# Patient Record
Sex: FEMALE | Race: WHITE | NOT HISPANIC OR LATINO | Employment: UNEMPLOYED | URBAN - METROPOLITAN AREA
[De-identification: names, ages, dates, MRNs, and addresses within clinical notes are randomized per-mention and may not be internally consistent; named-entity substitution may affect disease eponyms.]

---

## 2017-04-14 ENCOUNTER — OFFICE VISIT (OUTPATIENT)
Dept: FAMILY MEDICINE CLINIC | Facility: CLINIC | Age: 42
End: 2017-04-14

## 2017-04-14 VITALS
TEMPERATURE: 98.6 F | WEIGHT: 190 LBS | SYSTOLIC BLOOD PRESSURE: 110 MMHG | HEIGHT: 67 IN | OXYGEN SATURATION: 94 % | DIASTOLIC BLOOD PRESSURE: 70 MMHG | RESPIRATION RATE: 16 BRPM | BODY MASS INDEX: 29.82 KG/M2 | HEART RATE: 81 BPM

## 2017-04-14 DIAGNOSIS — Z00.00 LABORATORY EXAMINATION ORDERED AS PART OF A ROUTINE GENERAL MEDICAL EXAMINATION: ICD-10-CM

## 2017-04-14 DIAGNOSIS — G93.5 ARNOLD-CHIARI MALFORMATION, TYPE I (HCC): ICD-10-CM

## 2017-04-14 DIAGNOSIS — E55.9 VITAMIN D DEFICIENCY DISEASE: ICD-10-CM

## 2017-04-14 DIAGNOSIS — F41.9 ANXIETY: ICD-10-CM

## 2017-04-14 DIAGNOSIS — E03.9 ACQUIRED HYPOTHYROIDISM: ICD-10-CM

## 2017-04-14 DIAGNOSIS — H00.012 HORDEOLUM EXTERNUM OF RIGHT LOWER EYELID: Primary | ICD-10-CM

## 2017-04-14 DIAGNOSIS — E78.2 MIXED HYPERLIPIDEMIA: ICD-10-CM

## 2017-04-14 DIAGNOSIS — E53.8 VITAMIN B12 DEFICIENCY: ICD-10-CM

## 2017-04-14 PROCEDURE — 99213 OFFICE O/P EST LOW 20 MIN: CPT | Performed by: PHYSICIAN ASSISTANT

## 2017-04-14 RX ORDER — AMOXICILLIN AND CLAVULANATE POTASSIUM 875; 125 MG/1; MG/1
1 TABLET, FILM COATED ORAL 2 TIMES DAILY
Qty: 20 TABLET | Refills: 0 | Status: SHIPPED | OUTPATIENT
Start: 2017-04-14 | End: 2017-11-21

## 2017-04-14 RX ORDER — FLUCONAZOLE 150 MG/1
150 TABLET ORAL ONCE
Qty: 1 TABLET | Refills: 2 | Status: SHIPPED | OUTPATIENT
Start: 2017-04-14 | End: 2017-04-14

## 2017-04-14 RX ORDER — ERYTHROMYCIN 5 MG/G
OINTMENT OPHTHALMIC NIGHTLY
Qty: 1 EACH | Refills: 1 | Status: SHIPPED | OUTPATIENT
Start: 2017-04-14 | End: 2017-11-21

## 2017-04-14 NOTE — PATIENT INSTRUCTIONS
Warned patient that this medication can cause drowsiness and impair them operating machinery, including driving a car.  Caution is advised.  Low glycemic index diet  Exercise 30 minutes most days of the week  Make sure you get results on any labs or tests we ordered today  We discussed medications and how to take them as prescribed  Sleep 6-8 hours each night if possible  If you have not signed up for "[employer]hart, please activate your code ASAP from your After Visit Summary today    LDL goal <100  LDL goal if heart disease <70  HDL goal >60  Triglyceride goal <150  BP goal =<130/80  Fasting glucose <100    ER if facial redness or edema worse/spreads

## 2017-04-14 NOTE — PROGRESS NOTES
Subjective   Cindy Hawk is a 41 y.o. female.     History of Present Illness   Cindy Hawk 41 y.o. female who presents today for stye right eye and did rupture upon awakening;  Yellow white;    she has a history of   Patient Active Problem List   Diagnosis   • Vitamin B12 deficiency   • Vitamin D deficiency disease   • Acquired hypothyroidism   • Arnold-Chiari malformation, type I   • Neck pain   • Peripheral neuropathy   • Headache   • Anxiety   • Hyperlipidemia   • Arthritis   .    Some redness and edema right lower eye orbit and some better today and will still Rx oral Augmentin   Zoloft is helping anxiety and will cont. Same    Has known chronic h/a's and takes ultram and Dr. Jack will allow Rx for #90  I will need to update thyroid Rx    Lab Results   Component Value Date    TSH 5.650 (H) 05/18/2016   B12 was 329 and not on OTC  Need B12 level  No results found for: CHOL  No results found for: TRIG  No results found for: HDL  No results found for: LDLCALC  No results found for: LDL  No results found for: HDLLDLRATIO  No components found for: CHOLHDL    The following portions of the patient's history were reviewed and updated as appropriate: allergies, current medications, past family history, past medical history, past social history, past surgical history and problem list.    Review of Systems   Constitutional: Negative for activity change, appetite change and unexpected weight change.   HENT: Negative for nosebleeds and trouble swallowing.    Eyes: Positive for discharge. Negative for pain (was pain yesterday) and visual disturbance.   Respiratory: Negative for chest tightness, shortness of breath and wheezing.    Cardiovascular: Negative for chest pain and palpitations.   Gastrointestinal: Negative for abdominal pain and blood in stool.   Endocrine: Negative.    Genitourinary: Negative for difficulty urinating and hematuria.   Musculoskeletal: Negative for joint swelling.   Skin: Negative  for color change and rash.   Allergic/Immunologic: Negative.    Neurological: Positive for headaches. Negative for syncope and speech difficulty.   Hematological: Negative for adenopathy.   Psychiatric/Behavioral: Negative for agitation and confusion.   All other systems reviewed and are negative.      Objective   Physical Exam   Constitutional: She is oriented to person, place, and time. She appears well-developed and well-nourished. No distress.   HENT:   Head: Normocephalic and atraumatic.   Eyes: EOM are normal. Pupils are equal, round, and reactive to light. Right eye exhibits no discharge. Left eye exhibits no discharge. No scleral icterus.   Stye right margin mid lower   Neck: Normal range of motion. Neck supple. No tracheal deviation present. No thyromegaly present.   Cardiovascular: Normal rate, regular rhythm, normal heart sounds, intact distal pulses and normal pulses.  Exam reveals no gallop.    No murmur heard.  Pulmonary/Chest: Effort normal and breath sounds normal. No respiratory distress. She has no wheezes. She has no rales.   Musculoskeletal: Normal range of motion.   Neurological: She is alert and oriented to person, place, and time. She exhibits normal muscle tone. Coordination normal.   Skin: Skin is warm. No rash noted. There is erythema (internal hordelum mid lower right lid;  she does have some redness and edema right lower orbit;  less sore to touch). No pallor.   Psychiatric: She has a normal mood and affect. Her behavior is normal. Judgment and thought content normal.   Nursing note and vitals reviewed.      Assessment/Plan   Cindy was seen today for eye pain.    Diagnoses and all orders for this visit:    Hordeolum externum of right lower eyelid  -     Comprehensive metabolic panel  -     Lipid panel  -     CBC and Differential  -     TSH  -     T4, Free  -     Vitamin D 25 Hydroxy  -     Vitamin B12  -     Folate    Acquired hypothyroidism  -     Comprehensive metabolic panel  -      Lipid panel  -     CBC and Differential  -     TSH  -     T4, Free  -     Vitamin D 25 Hydroxy  -     Vitamin B12  -     Folate    Vitamin D deficiency disease  -     Comprehensive metabolic panel  -     Lipid panel  -     CBC and Differential  -     TSH  -     T4, Free  -     Vitamin D 25 Hydroxy  -     Vitamin B12  -     Folate    Vitamin B12 deficiency  -     Comprehensive metabolic panel  -     Lipid panel  -     CBC and Differential  -     TSH  -     T4, Free  -     Vitamin D 25 Hydroxy  -     Vitamin B12  -     Folate    Arnold-Chiari malformation, type I  -     Comprehensive metabolic panel  -     Lipid panel  -     CBC and Differential  -     TSH  -     T4, Free  -     Vitamin D 25 Hydroxy  -     Vitamin B12  -     Folate    Mixed hyperlipidemia  -     Comprehensive metabolic panel  -     Lipid panel  -     CBC and Differential  -     TSH  -     T4, Free  -     Vitamin D 25 Hydroxy  -     Vitamin B12  -     Folate    Anxiety  -     Comprehensive metabolic panel  -     Lipid panel  -     CBC and Differential  -     TSH  -     T4, Free  -     Vitamin D 25 Hydroxy  -     Vitamin B12  -     Folate    Laboratory examination ordered as part of a routine general medical examination  -     Comprehensive metabolic panel  -     Lipid panel  -     CBC and Differential  -     TSH  -     T4, Free  -     Vitamin D 25 Hydroxy  -     Vitamin B12  -     Folate    Other orders  -     amoxicillin-clavulanate (AUGMENTIN) 875-125 MG per tablet; Take 1 tablet by mouth 2 (Two) Times a Day. One PO BID for infection  -     fluconazole (DIFLUCAN) 150 MG tablet; Take 1 tablet by mouth 1 (One) Time for 1 dose. One PO X 1 for yeast infection  -     erythromycin (ROMYCIN) 5 MG/GM ophthalmic ointment; Administer  to the right eye Every Night. QID X 5-7 days (2 days past clear)              I have reviewed the notes, assessments, and/or procedures performed by Lyly Back PA-C, I concur with her/his documentation of Cindy BURGER  Carine.

## 2017-11-21 ENCOUNTER — OFFICE VISIT (OUTPATIENT)
Dept: FAMILY MEDICINE CLINIC | Facility: CLINIC | Age: 42
End: 2017-11-21

## 2017-11-21 VITALS
DIASTOLIC BLOOD PRESSURE: 70 MMHG | TEMPERATURE: 98.2 F | OXYGEN SATURATION: 100 % | RESPIRATION RATE: 16 BRPM | WEIGHT: 183 LBS | SYSTOLIC BLOOD PRESSURE: 120 MMHG | HEIGHT: 67 IN | HEART RATE: 81 BPM | BODY MASS INDEX: 28.72 KG/M2

## 2017-11-21 DIAGNOSIS — E55.9 VITAMIN D DEFICIENCY DISEASE: ICD-10-CM

## 2017-11-21 DIAGNOSIS — G93.5 ARNOLD-CHIARI MALFORMATION, TYPE I (HCC): ICD-10-CM

## 2017-11-21 DIAGNOSIS — E03.9 ACQUIRED HYPOTHYROIDISM: ICD-10-CM

## 2017-11-21 DIAGNOSIS — E53.8 VITAMIN B12 DEFICIENCY: Primary | ICD-10-CM

## 2017-11-21 DIAGNOSIS — H53.9 VISUAL DISTURBANCE: ICD-10-CM

## 2017-11-21 DIAGNOSIS — H93.13 TINNITUS OF BOTH EARS: ICD-10-CM

## 2017-11-21 DIAGNOSIS — F41.9 ANXIETY: Primary | ICD-10-CM

## 2017-11-21 DIAGNOSIS — E53.8 VITAMIN B12 DEFICIENCY: ICD-10-CM

## 2017-11-21 DIAGNOSIS — R51.9 WORSENING HEADACHES: ICD-10-CM

## 2017-11-21 PROCEDURE — 99214 OFFICE O/P EST MOD 30 MIN: CPT | Performed by: PHYSICIAN ASSISTANT

## 2017-11-21 PROCEDURE — 90686 IIV4 VACC NO PRSV 0.5 ML IM: CPT | Performed by: PHYSICIAN ASSISTANT

## 2017-11-21 PROCEDURE — 90471 IMMUNIZATION ADMIN: CPT | Performed by: PHYSICIAN ASSISTANT

## 2017-11-21 RX ORDER — TRAMADOL HYDROCHLORIDE 50 MG/1
50 TABLET ORAL EVERY 6 HOURS PRN
Qty: 90 TABLET | Refills: 2 | Status: SHIPPED | OUTPATIENT
Start: 2017-11-21 | End: 2018-03-21 | Stop reason: SDUPTHER

## 2017-11-21 NOTE — PATIENT INSTRUCTIONS
Low glycemic index diet  Exercise 30 minutes most days of the week  Make sure you get results on any labs or tests we ordered today  We discussed medications and how to take them as prescribed  Sleep 6-8 hours each night if possible  If you have not signed up for [email or web address], please activate your code ASAP from your After Visit Summary today    LDL goal <100  LDL goal if heart disease <70  HDL goal >60  Triglyceride goal <150  BP goal =<130/80  Fasting glucose <100    Refer neuro  Get eye exam

## 2017-11-21 NOTE — PROGRESS NOTES
Subjective   Cindy Hawk is a 42 y.o. female.     History of Present Illness   Cindy Hawk 42 y.o. female who presents today for routine follow up check and medication refills.  she has a history of   Patient Active Problem List   Diagnosis   • Vitamin B12 deficiency   • Vitamin D deficiency disease   • Acquired hypothyroidism   • Arnold-Chiari malformation, type I   • Neck pain   • Peripheral neuropathy   • Headache   • Anxiety   • Hyperlipidemia   • Arthritis   .  Since the last visit, she has overall felt tired.  She has Hypothyroidism and will need to update labs for continued treatment and Vitamin D deficiency and will update labs to confirm level is at goal >30.  she has been compliant with current medications have reviewed them.  The patient denies medication side effects.    Results for orders placed or performed in visit on 01/27/16   Comprehensive metabolic panel   Result Value Ref Range    Glucose 108 (H) 65 - 99 mg/dL    BUN 13 6 - 20 mg/dL    Creatinine 0.90 0.57 - 1.00 mg/dL    eGFR Non African Am 81 >59 mL/min/1.73    eGFR African Am 94 >59 mL/min/1.73    BUN/Creatinine Ratio 14 8 - 20    Sodium 138 134 - 144 mmol/L    Potassium 4.4 3.5 - 5.2 mmol/L    Chloride 97 97 - 108 mmol/L    Total CO2 25 18 - 29 mmol/L    Calcium 9.4 8.7 - 10.2 mg/dL    Total Protein 7.6 6.0 - 8.5 g/dL    Albumin 4.9 3.5 - 5.5 g/dL    Globulin 2.7 1.5 - 4.5 g/dL    A/G Ratio 1.8 1.1 - 2.5    Total Bilirubin 0.5 0.0 - 1.2 mg/dL    Alkaline Phosphatase 85 39 - 117 IU/L    AST (SGOT) 30 0 - 40 IU/L    ALT (SGPT) 35 (H) 0 - 32 IU/L   Vitamin B12   Result Value Ref Range    Vitamin B-12 329 211 - 946 pg/mL   TSH   Result Value Ref Range    TSH 5.650 (H) 0.450 - 4.500 uIU/mL   CBC and Differential   Result Value Ref Range    WBC 7.6 3.4 - 10.8 x10E3/uL    RBC 4.73 3.77 - 5.28 x10E6/uL    Hemoglobin 13.9 11.1 - 15.9 g/dL    Hematocrit 43.8 34.0 - 46.6 %    MCV 93 79 - 97 fL    MCH 29.4 26.6 - 33.0 pg    MCHC 31.7 31.5  - 35.7 g/dL    RDW 13.7 12.3 - 15.4 %    Platelets 323 150 - 379 x10E3/uL    Neutrophil Rel % 57 %    Lymphocyte Rel % 33 %    Monocyte Rel % 6 %    Eosinophil Rel % 3 %    Basophil Rel % 1 %    Neutrophils Absolute 4.3 1.4 - 7.0 x10E3/uL    Lymphocytes Absolute 2.5 0.7 - 3.1 x10E3/uL    Monocytes Absolute 0.4 0.1 - 0.9 x10E3/uL    Eosinophils Absolute 0.2 0.0 - 0.4 x10E3/uL    Basophils Absolute 0.1 0.0 - 0.2 x10E3/uL    Immature Granulocyte Rel % 0 %    Immature Grans Absolute 0.0 0.0 - 0.1 x10E3/uL       She did labs today;  She has been off thyroid med for 6mos;  She is on Ultram since Chiari surgery years ago and has neck and head pain.  The patient has read and signed the [facility] Controlled Substance Contract.  I will continue to see patient for regular follow up appointments.  They are well controlled on their medication.  LISA has been reviewed by me and is updated every 3 months. The patient is aware of the potential for addiction and dependence.    B12 labs pending and will go from there.  She has not been taking B12 for 6mos    Cindy Hawk 42 y.o. female presents for evaluation of generalized. Symptoms began about several years ago. Generally, the headaches are parietal and occipital.  This is constant.  She is having swishing in ears at night when lays down.  Some ringing in ears at times.  She reports episodes of blurred vision and off balance for hours at a time and resolves.  Can be weeks between episodes. The location of the headache pain is occipital and parietal. Recently, the headaches have been increasing in severity. Work attendance or other daily activities are affected by the headaches. Precipitating factors include: stress. The headaches are usually not preceded by an aura. Associated symptoms include rare nausea rare photophobia. The patient denies depression. Home treatment has included Ultram with some improvement. Other history includes: h/a since surgery . Family history  includes no known family members with significant headaches.  Prior therapies tried include Ultram with relief some help, but still symptoms.    She noticed in less able to smell things in last 2 years    I d/w Dr Jack and agree with MRI with and without contrast; see eye doc and measure pressures; will have her see Neuro next and then consider ENT.  Labs pending  The following portions of the patient's history were reviewed and updated as appropriate: allergies, current medications, past family history, past medical history, past social history, past surgical history and problem list.    Review of Systems   Constitutional: Positive for fatigue and unexpected weight change. Negative for activity change and appetite change.   HENT: Positive for tinnitus. Negative for nosebleeds and trouble swallowing.    Eyes: Negative for pain and visual disturbance.   Respiratory: Negative for chest tightness, shortness of breath and wheezing.    Cardiovascular: Negative for chest pain and palpitations.   Gastrointestinal: Negative for abdominal pain and blood in stool.   Endocrine: Negative.    Genitourinary: Negative for difficulty urinating and hematuria.   Musculoskeletal: Positive for back pain, myalgias, neck pain and neck stiffness. Negative for joint swelling.   Skin: Negative for color change and rash.   Allergic/Immunologic: Negative.    Neurological: Positive for dizziness, tremors, weakness, light-headedness, numbness and headaches. Negative for syncope and speech difficulty.   Hematological: Negative for adenopathy.   Psychiatric/Behavioral: Negative for agitation and confusion.   All other systems reviewed and are negative.      Objective   Physical Exam   Constitutional: She is oriented to person, place, and time. She appears well-developed and well-nourished. No distress.   HENT:   Head: Normocephalic and atraumatic.   Eyes: Conjunctivae and EOM are normal. Pupils are equal, round, and reactive to light. Right  eye exhibits no discharge. Left eye exhibits no discharge. No scleral icterus.   Neck: Normal range of motion. Neck supple. No tracheal deviation present. No thyromegaly present.   Cardiovascular: Normal rate, regular rhythm, normal heart sounds, intact distal pulses and normal pulses.  Exam reveals no gallop.    No murmur heard.  Pulmonary/Chest: Effort normal and breath sounds normal. No respiratory distress. She has no wheezes. She has no rales.   Musculoskeletal: Normal range of motion. She exhibits tenderness (trapezius bilat).   Rhomberg neg and no pronator neg   Lymphadenopathy:     She has no cervical adenopathy.   Neurological: She is alert and oriented to person, place, and time. She has normal reflexes. She displays normal reflexes. No cranial nerve deficit. She exhibits normal muscle tone. Coordination normal.   Skin: Skin is warm. No rash noted. No erythema. No pallor.   Psychiatric: She has a normal mood and affect. Her behavior is normal. Judgment and thought content normal.   Nursing note and vitals reviewed.      Assessment/Plan   Problems Addressed this Visit        Digestive    Vitamin B12 deficiency    Relevant Orders    MRI Brain With & Without Contrast    Ambulatory Referral to Neurology    Vitamin D deficiency disease    Relevant Orders    MRI Brain With & Without Contrast    Ambulatory Referral to Neurology       Endocrine    Acquired hypothyroidism    Relevant Orders    MRI Brain With & Without Contrast    Ambulatory Referral to Neurology       Nervous and Auditory    Arnold-Chiari malformation, type I    Relevant Orders    MRI Brain With & Without Contrast    Ambulatory Referral to Neurology       Other    Anxiety - Primary    Relevant Orders    MRI Brain With & Without Contrast    Ambulatory Referral to Neurology      Other Visit Diagnoses     Visual disturbance        Relevant Orders    MRI Brain With & Without Contrast    Ambulatory Referral to Neurology    Worsening headaches         Relevant Orders    MRI Brain With & Without Contrast    Ambulatory Referral to Neurology    Tinnitus of both ears        Relevant Orders    MRI Brain With & Without Contrast    Ambulatory Referral to Neurology                I have reviewed the notes, assessments, and/or procedures performed by Lyly Back PA-C, I concur with her/his documentation of Cindy Hawk.

## 2017-11-22 LAB
25(OH)D3+25(OH)D2 SERPL-MCNC: 26.7 NG/ML (ref 30–100)
ALBUMIN SERPL-MCNC: 4.6 G/DL (ref 3.5–5.2)
ALBUMIN/GLOB SERPL: 1.6 G/DL
ALP SERPL-CCNC: 75 U/L (ref 39–117)
ALT SERPL-CCNC: 16 U/L (ref 1–33)
AST SERPL-CCNC: 15 U/L (ref 1–32)
BASOPHILS # BLD AUTO: 0.06 10*3/MM3 (ref 0–0.2)
BASOPHILS NFR BLD AUTO: 0.6 % (ref 0–1.5)
BILIRUB SERPL-MCNC: 0.3 MG/DL (ref 0.1–1.2)
BUN SERPL-MCNC: 15 MG/DL (ref 6–20)
BUN/CREAT SERPL: 18.5 (ref 7–25)
CALCIUM SERPL-MCNC: 10.1 MG/DL (ref 8.6–10.5)
CHLORIDE SERPL-SCNC: 101 MMOL/L (ref 98–107)
CHOLEST SERPL-MCNC: 216 MG/DL (ref 0–200)
CO2 SERPL-SCNC: 26.2 MMOL/L (ref 22–29)
CREAT SERPL-MCNC: 0.81 MG/DL (ref 0.57–1)
EOSINOPHIL # BLD AUTO: 0.23 10*3/MM3 (ref 0–0.7)
EOSINOPHIL NFR BLD AUTO: 2.3 % (ref 0.3–6.2)
ERYTHROCYTE [DISTWIDTH] IN BLOOD BY AUTOMATED COUNT: 13.5 % (ref 11.7–13)
FOLATE SERPL-MCNC: 8.62 NG/ML (ref 4.78–24.2)
GFR SERPLBLD CREATININE-BSD FMLA CKD-EPI: 78 ML/MIN/1.73
GFR SERPLBLD CREATININE-BSD FMLA CKD-EPI: 95 ML/MIN/1.73
GLOBULIN SER CALC-MCNC: 2.8 GM/DL
GLUCOSE SERPL-MCNC: 68 MG/DL (ref 65–99)
HCT VFR BLD AUTO: 42.4 % (ref 35.6–45.5)
HDLC SERPL-MCNC: 51 MG/DL (ref 40–60)
HGB BLD-MCNC: 14 G/DL (ref 11.9–15.5)
IMM GRANULOCYTES # BLD: 0.02 10*3/MM3 (ref 0–0.03)
IMM GRANULOCYTES NFR BLD: 0.2 % (ref 0–0.5)
LDLC SERPL CALC-MCNC: 123 MG/DL (ref 0–100)
LYMPHOCYTES # BLD AUTO: 3.55 10*3/MM3 (ref 0.9–4.8)
LYMPHOCYTES NFR BLD AUTO: 35.3 % (ref 19.6–45.3)
MCH RBC QN AUTO: 31.3 PG (ref 26.9–32)
MCHC RBC AUTO-ENTMCNC: 33 G/DL (ref 32.4–36.3)
MCV RBC AUTO: 94.9 FL (ref 80.5–98.2)
MONOCYTES # BLD AUTO: 0.63 10*3/MM3 (ref 0.2–1.2)
MONOCYTES NFR BLD AUTO: 6.3 % (ref 5–12)
NEUTROPHILS # BLD AUTO: 5.57 10*3/MM3 (ref 1.9–8.1)
NEUTROPHILS NFR BLD AUTO: 55.3 % (ref 42.7–76)
PLATELET # BLD AUTO: 250 10*3/MM3 (ref 140–500)
POTASSIUM SERPL-SCNC: 3.7 MMOL/L (ref 3.5–5.2)
PROT SERPL-MCNC: 7.4 G/DL (ref 6–8.5)
RBC # BLD AUTO: 4.47 10*6/MM3 (ref 3.9–5.2)
SODIUM SERPL-SCNC: 140 MMOL/L (ref 136–145)
T4 FREE SERPL-MCNC: 0.7 NG/DL (ref 0.93–1.7)
TRIGL SERPL-MCNC: 209 MG/DL (ref 0–150)
TSH SERPL DL<=0.005 MIU/L-ACNC: 18.21 MIU/ML (ref 0.27–4.2)
VIT B12 SERPL-MCNC: 205 PG/ML (ref 211–946)
VLDLC SERPL CALC-MCNC: 41.8 MG/DL (ref 5–40)
WBC # BLD AUTO: 10.06 10*3/MM3 (ref 4.5–10.7)

## 2017-11-22 RX ORDER — LEVOTHYROXINE SODIUM 0.1 MG/1
100 TABLET ORAL DAILY
Qty: 90 TABLET | Refills: 3 | Status: SHIPPED | OUTPATIENT
Start: 2017-11-22 | End: 2019-03-13

## 2017-11-22 RX ORDER — LEVOTHYROXINE SODIUM 0.05 MG/1
50 TABLET ORAL DAILY
Qty: 30 TABLET | Refills: 0 | Status: SHIPPED | OUTPATIENT
Start: 2017-11-22 | End: 2018-03-21

## 2017-12-08 ENCOUNTER — HOSPITAL ENCOUNTER (OUTPATIENT)
Dept: MRI IMAGING | Facility: HOSPITAL | Age: 42
Discharge: HOME | End: 2017-12-08
Admitting: PHYSICIAN ASSISTANT

## 2017-12-08 DIAGNOSIS — E53.8 VITAMIN B12 DEFICIENCY: ICD-10-CM

## 2017-12-08 DIAGNOSIS — G93.5 ARNOLD-CHIARI MALFORMATION, TYPE I (HCC): ICD-10-CM

## 2017-12-08 DIAGNOSIS — R51.9 WORSENING HEADACHES: ICD-10-CM

## 2017-12-08 DIAGNOSIS — H53.9 VISUAL DISTURBANCE: ICD-10-CM

## 2017-12-08 DIAGNOSIS — E55.9 VITAMIN D DEFICIENCY DISEASE: ICD-10-CM

## 2017-12-08 DIAGNOSIS — F41.9 ANXIETY: ICD-10-CM

## 2017-12-08 DIAGNOSIS — H93.13 TINNITUS OF BOTH EARS: ICD-10-CM

## 2017-12-08 DIAGNOSIS — E03.9 ACQUIRED HYPOTHYROIDISM: ICD-10-CM

## 2017-12-08 PROCEDURE — 70553 MRI BRAIN STEM W/O & W/DYE: CPT

## 2017-12-08 PROCEDURE — A9577 INJ MULTIHANCE: HCPCS | Performed by: PHYSICIAN ASSISTANT

## 2017-12-08 PROCEDURE — 0 GADOBENATE DIMEGLUMINE 529 MG/ML SOLUTION: Performed by: PHYSICIAN ASSISTANT

## 2017-12-08 RX ADMIN — GADOBENATE DIMEGLUMINE 17 ML: 529 INJECTION, SOLUTION INTRAVENOUS at 07:03

## 2017-12-10 DIAGNOSIS — R93.0 ABNORMAL MRI OF HEAD: Primary | ICD-10-CM

## 2017-12-20 RX ORDER — SERTRALINE HYDROCHLORIDE 100 MG/1
TABLET, FILM COATED ORAL
Qty: 45 TABLET | Refills: 10 | Status: SHIPPED | OUTPATIENT
Start: 2017-12-20 | End: 2018-07-23 | Stop reason: SDUPTHER

## 2018-01-09 ENCOUNTER — TELEPHONE (OUTPATIENT)
Dept: NEUROSURGERY | Facility: CLINIC | Age: 43
End: 2018-01-09

## 2018-01-09 NOTE — TELEPHONE ENCOUNTER
Called and spoke with Cindy and she stated that she never received her new patient packet in the mail. Pt stated she will have her  come by the office today while he is on his lunch break to pick it up. Pt agreed to have the packet returned in person by Thursday Jan 11, 2018 because we would not receive it in time if she mailed it back.

## 2018-01-11 NOTE — TELEPHONE ENCOUNTER
Called Cindy to remind her that we need her new patient packet by the end of today. Pt stated that her  should be in the office with the packet around 1pm.

## 2018-01-16 ENCOUNTER — OFFICE VISIT (OUTPATIENT)
Dept: NEUROSURGERY | Facility: CLINIC | Age: 43
End: 2018-01-16

## 2018-01-16 VITALS
SYSTOLIC BLOOD PRESSURE: 124 MMHG | DIASTOLIC BLOOD PRESSURE: 74 MMHG | WEIGHT: 186 LBS | HEIGHT: 67 IN | BODY MASS INDEX: 29.19 KG/M2

## 2018-01-16 DIAGNOSIS — Z86.69 HISTORY OF CHIARI MALFORMATION: ICD-10-CM

## 2018-01-16 DIAGNOSIS — M54.2 NECK PAIN: ICD-10-CM

## 2018-01-16 DIAGNOSIS — G44.89 OTHER HEADACHE SYNDROME: Primary | ICD-10-CM

## 2018-01-16 DIAGNOSIS — M54.12 CERVICAL RADICULITIS: ICD-10-CM

## 2018-01-16 PROCEDURE — 99244 OFF/OP CNSLTJ NEW/EST MOD 40: CPT | Performed by: NURSE PRACTITIONER

## 2018-01-16 NOTE — PROGRESS NOTES
Subjective   Patient ID: Cindy Hawk is a 42 y.o. female is being seen for consultation today at the request of Lyly Back PA-C for headaches and neck pain with blurred vision, numbness in bilateral hands, fatigue and balance issues.   Pt is currently taking Tramadol 50 mg BID.   Pt has hx of Chiari malformation in 2007.   Pt has new MRI brain.     HPI Comments: Ms. Hawk was referred to the office today for persistent headaches and neck pain.  She has a history of suboccipital craniectomy procedure in 2007 for Chiari I malformation.  Despite this surgery she is continue to have persistent headaches.  She denies any nausea or vomiting.  She denies any leg or arm pain but does have persistent tingling in both hands and feet.  She has not had any difficulty with weakness in her upper or lower extremities.    Neck Pain    This is a chronic problem. The current episode started more than 1 year ago. The problem occurs daily. The problem has been gradually worsening. The pain is associated with nothing. The pain is present in the left side, midline, right side and occipital region. The quality of the pain is described as stabbing (pressure). The pain is at a severity of 5/10. The pain is moderate. The symptoms are aggravated by coughing, sneezing, twisting, bending, position and stress. The pain is worse during the day. Stiffness is present all day. Associated symptoms include headaches, numbness (bilateral hands and feet), tingling (bilateral hands and feet), a visual change (blurred vision only after reading a few chapters of a book) and weakness (bilateral hands, arms and legs). Pertinent negatives include no chest pain. She has tried muscle relaxants and heat for the symptoms. The treatment provided mild relief.   Headache    This is a chronic problem. The current episode started more than 1 year ago. The problem occurs constantly. The problem has been gradually worsening. Pain location: All over her  head; primarily the occipital aspect of the head. The pain radiates to the right neck, left neck, right shoulder and left shoulder. The pain quality is similar to prior headaches. The quality of the pain is described as sharp and dull. The pain is at a severity of 6/10. The pain is moderate. Associated symptoms include back pain (Innerscapular pain ), blurred vision, dizziness, neck pain, numbness (bilateral hands and feet), tingling (bilateral hands and feet), tinnitus, a visual change (blurred vision only after reading a few chapters of a book) and weakness (bilateral hands, arms and legs). Pertinent negatives include no ear pain or seizures. The symptoms are aggravated by activity, fatigue, exposure to cold air, emotional stress and coughing. She has tried acetaminophen, NSAIDs, Excedrin, cold packs and darkened room for the symptoms. The treatment provided mild relief. Her past medical history is significant for migraine headaches and pseudotumor cerebri. There is no history of recent head traumas.       The following portions of the patient's history were reviewed and updated as appropriate: allergies, current medications, past family history, past medical history, past social history, past surgical history and problem list.    Review of Systems   Constitutional: Negative for appetite change.   HENT: Positive for tinnitus. Negative for ear pain and facial swelling.    Eyes: Positive for blurred vision.   Respiratory: Negative for chest tightness and shortness of breath.    Cardiovascular: Negative for chest pain.   Musculoskeletal: Positive for back pain (Innerscapular pain ), neck pain and neck stiffness.   Neurological: Positive for dizziness, tingling (bilateral hands and feet), weakness (bilateral hands, arms and legs), numbness (bilateral hands and feet) and headaches. Negative for seizures.   All other systems reviewed and are negative.      Objective   Physical Exam   Constitutional: She is oriented to  person, place, and time. She appears well-developed and well-nourished. She is cooperative. No distress.   HENT:   Head: Normocephalic and atraumatic.   Right Ear: External ear normal.   Left Ear: External ear normal.   Nose: Nose normal.   Mouth/Throat: Oropharynx is clear and moist.   Eyes: Conjunctivae and EOM are normal. Pupils are equal, round, and reactive to light. Right eye exhibits no discharge. Left eye exhibits no discharge.   Neck: Normal range of motion. Neck supple.   Cardiovascular: Normal rate and regular rhythm.    Pulmonary/Chest: Effort normal and breath sounds normal. No respiratory distress.   Abdominal: Soft. She exhibits no distension. There is no tenderness.   Musculoskeletal: Normal range of motion. She exhibits tenderness (tender to palpation in the occipital cervical region, bilateral trapezius regions, bilateral scapular regions.). She exhibits no edema or deformity.   Neurological: She is alert and oriented to person, place, and time. She has normal strength and normal reflexes. She displays normal reflexes. No cranial nerve deficit or sensory deficit. She exhibits normal muscle tone. Coordination normal. GCS eye subscore is 4. GCS verbal subscore is 5. GCS motor subscore is 6.   Reflex Scores:       Tricep reflexes are 2+ on the right side and 2+ on the left side.       Bicep reflexes are 2+ on the right side and 2+ on the left side.       Brachioradialis reflexes are 2+ on the right side and 2+ on the left side.       Patellar reflexes are 2+ on the right side and 2+ on the left side.       Achilles reflexes are 2+ on the right side and 2+ on the left side.  No focal motor deficits in upper or lower extremities.  Normal DTRs.  Decreased sensation to light touch in the left arm in all 5 fingers.  Gait is normal.  Normal tandem walking.  Negative Romberg.  Negative dysmetria. Negative Parmar's; negative clonus.    Skin: Skin is warm and dry. No rash noted. She is not diaphoretic.    Psychiatric: She has a normal mood and affect. Thought content normal.   Vitals reviewed.    Neurologic Exam     Mental Status   Oriented to person, place, and time.     Cranial Nerves     CN III, IV, VI   Pupils are equal, round, and reactive to light.  Extraocular motions are normal.     Motor Exam     Strength   Strength 5/5 throughout.     Gait, Coordination, and Reflexes     Reflexes   Right brachioradialis: 2+  Left brachioradialis: 2+  Right biceps: 2+  Left biceps: 2+  Right triceps: 2+  Left triceps: 2+  Right patellar: 2+  Left patellar: 2+  Right achilles: 2+  Left achilles: 2+      Assessment/Plan   Independent Review of Radiographic Studies:       I reviewed the MRI images of the brain performed with and without contrast dated December 8, 2017 today in the office.  The MRI shows the previous suboccipital craniectomy.  No evidence of acute intracranial pathology.  I reviewed these images with Dr. Crawford who was in the office with me today.  There was some evidence of intravascular enhancement which is felt to be a normal postoperative change.    Medical Decision Making:      Ms. Hawk was seen today for neurosurgical opinion at the request of Lyly Back PA-C.  Notes from today's visit will be forwarded upon completion.  Ms. Hawk is a 42-year-old female with a history of Chiari I malformation that was treated with suboccipital craniectomy by Dr. Wicho Peraza in 2007.  The patient states that after the surgery she had immediate relief in her headache symptoms however the headaches began to return in 3 months later.  Dr. Peraza left town and the patient subsequently saw Dr. Zurdo Toussaint.  He reviewed imaging at that time and did not recommend any further surgery.  The patient states that she is also seeing pain management specialist and neurologists for a relief in her headache symptoms with no improvement.    The pain is located in the posterior occipital portion of her head and neck. The pain is  "unrelenting and feels like intense pressure. Occasionally the pain moves to the top of her head and bitemporal regions.  She notes that the headaches are often worsened by stress.  She has had persistent fatigue, numbness and tingling in both hands and both feet.  Of note she has a history of pernicious anemia.  She states that she has not been compliant with the prescribed B12.  She felt that it did not help her at all.    Ms. Hawk denies any pain in her arms or legs.  She does admit to some issues with balance and dizziness.  She states that on occasion she feels \"wobbly.\"  In addition to the above symptoms she reports posterior neck pain with radiation into both trapezius, scapular regions.  She is also tender to palpation in these locations.     The brain MRI did not show anything suspicious.  Given the cervical complaints, I will order an MRI of the cervical spine without contrast. She will return to the office after that for re-evaluation of the cervical and brain MRI with Dr. Slade.       Cindy was seen today for neck pain and headache.    Diagnoses and all orders for this visit:    Other headache syndrome  -     MRI Cervical Spine Without Contrast; Future    History of Chiari malformation  -     MRI Cervical Spine Without Contrast; Future    Neck pain  -     MRI Cervical Spine Without Contrast; Future    Cervical radiculitis  -     MRI Cervical Spine Without Contrast; Future      Return for after radiographic imaging.                 "

## 2018-01-16 NOTE — PROGRESS NOTES
Subjective   Patient ID: Cindy Hawk is a 42 y.o. female      History of Present Illness    {Common H&P Review Areas:66821}    Review of Systems    Objective   Physical Exam  Neurologic Exam    Assessment/Plan   Independent Review of Radiographic Studies:    ***  Medical Decision Making:    ***  There are no diagnoses linked to this encounter.  No Follow-up on file.

## 2018-01-18 DIAGNOSIS — E53.8 B12 DEFICIENCY: Primary | ICD-10-CM

## 2018-01-18 RX ORDER — CYANOCOBALAMIN 1000 UG/ML
1000 INJECTION, SOLUTION INTRAMUSCULAR; SUBCUTANEOUS
Status: DISCONTINUED | OUTPATIENT
Start: 2018-01-18 | End: 2018-04-20

## 2018-01-24 ENCOUNTER — CLINICAL SUPPORT (OUTPATIENT)
Dept: FAMILY MEDICINE CLINIC | Facility: CLINIC | Age: 43
End: 2018-01-24

## 2018-01-24 DIAGNOSIS — E53.8 VITAMIN B12 DEFICIENCY: Primary | ICD-10-CM

## 2018-01-24 PROCEDURE — 96372 THER/PROPH/DIAG INJ SC/IM: CPT | Performed by: PHYSICIAN ASSISTANT

## 2018-01-24 RX ADMIN — CYANOCOBALAMIN 1000 MCG: 1000 INJECTION, SOLUTION INTRAMUSCULAR; SUBCUTANEOUS at 13:50

## 2018-02-01 ENCOUNTER — CLINICAL SUPPORT (OUTPATIENT)
Dept: FAMILY MEDICINE CLINIC | Facility: CLINIC | Age: 43
End: 2018-02-01

## 2018-02-01 DIAGNOSIS — E53.8 VITAMIN B12 DEFICIENCY: Primary | ICD-10-CM

## 2018-02-01 PROCEDURE — 96372 THER/PROPH/DIAG INJ SC/IM: CPT | Performed by: PHYSICIAN ASSISTANT

## 2018-02-01 RX ADMIN — CYANOCOBALAMIN 1000 MCG: 1000 INJECTION, SOLUTION INTRAMUSCULAR; SUBCUTANEOUS at 08:59

## 2018-02-09 ENCOUNTER — CLINICAL SUPPORT (OUTPATIENT)
Dept: FAMILY MEDICINE CLINIC | Facility: CLINIC | Age: 43
End: 2018-02-09

## 2018-02-09 DIAGNOSIS — E53.8 VITAMIN B12 DEFICIENCY: Primary | ICD-10-CM

## 2018-02-09 PROCEDURE — 96372 THER/PROPH/DIAG INJ SC/IM: CPT | Performed by: PHYSICIAN ASSISTANT

## 2018-02-09 RX ADMIN — CYANOCOBALAMIN 1000 MCG: 1000 INJECTION, SOLUTION INTRAMUSCULAR; SUBCUTANEOUS at 08:24

## 2018-02-16 ENCOUNTER — CLINICAL SUPPORT (OUTPATIENT)
Dept: FAMILY MEDICINE CLINIC | Facility: CLINIC | Age: 43
End: 2018-02-16

## 2018-02-16 DIAGNOSIS — E53.8 VITAMIN B12 DEFICIENCY: Primary | ICD-10-CM

## 2018-02-16 PROCEDURE — 96372 THER/PROPH/DIAG INJ SC/IM: CPT | Performed by: PHYSICIAN ASSISTANT

## 2018-02-16 RX ADMIN — CYANOCOBALAMIN 1000 MCG: 1000 INJECTION, SOLUTION INTRAMUSCULAR; SUBCUTANEOUS at 08:50

## 2018-03-21 ENCOUNTER — OFFICE VISIT (OUTPATIENT)
Dept: FAMILY MEDICINE CLINIC | Facility: CLINIC | Age: 43
End: 2018-03-21

## 2018-03-21 VITALS
HEART RATE: 72 BPM | RESPIRATION RATE: 16 BRPM | WEIGHT: 194 LBS | BODY MASS INDEX: 30.45 KG/M2 | DIASTOLIC BLOOD PRESSURE: 62 MMHG | TEMPERATURE: 98.1 F | OXYGEN SATURATION: 100 % | SYSTOLIC BLOOD PRESSURE: 110 MMHG | HEIGHT: 67 IN

## 2018-03-21 DIAGNOSIS — F41.9 ANXIETY: ICD-10-CM

## 2018-03-21 DIAGNOSIS — E53.8 VITAMIN B12 DEFICIENCY: ICD-10-CM

## 2018-03-21 DIAGNOSIS — E03.9 ACQUIRED HYPOTHYROIDISM: Primary | ICD-10-CM

## 2018-03-21 DIAGNOSIS — G62.9 PERIPHERAL POLYNEUROPATHY: ICD-10-CM

## 2018-03-21 DIAGNOSIS — E55.9 VITAMIN D DEFICIENCY DISEASE: ICD-10-CM

## 2018-03-21 PROCEDURE — 96372 THER/PROPH/DIAG INJ SC/IM: CPT | Performed by: PHYSICIAN ASSISTANT

## 2018-03-21 PROCEDURE — 99214 OFFICE O/P EST MOD 30 MIN: CPT | Performed by: PHYSICIAN ASSISTANT

## 2018-03-21 RX ORDER — TRAMADOL HYDROCHLORIDE 50 MG/1
50 TABLET ORAL EVERY 6 HOURS PRN
Qty: 90 TABLET | Refills: 2 | Status: SHIPPED | OUTPATIENT
Start: 2018-03-21 | End: 2018-07-23 | Stop reason: SDUPTHER

## 2018-03-21 RX ADMIN — CYANOCOBALAMIN 1000 MCG: 1000 INJECTION, SOLUTION INTRAMUSCULAR; SUBCUTANEOUS at 09:58

## 2018-03-21 NOTE — PROGRESS NOTES
Subjective   Cindy Hawk is a 42 y.o. female.     History of Present Illness   Still having daily h/a's, blurry vision, occas nausea; dizziness, numbness in upper ext and legs;  Has been to neurosurgery and has f/u and having MRI C spine and also has neurology appt  She is on B12 injections and less ringing in ear;  She did have once weekly and this is first monthly B12    Cindy Hawk 42 y.o. female who presents today for routine follow up check and medication refills.  she has a history of   Patient Active Problem List   Diagnosis   • Vitamin B12 deficiency   • Vitamin D deficiency disease   • Acquired hypothyroidism   • Arnold-Chiari malformation, type I   • Neck pain   • Peripheral neuropathy   • Headache   • Anxiety   • Hyperlipidemia   • Arthritis   .  Since the last visit, she has overall felt tired.  She has Hyperlipidemia and working on this with diet and exercise and Hypothyroidism and will need to update labs for continued treatment.  she has been compliant with current medications have reviewed them.  The patient denies medication side effects.    Results for orders placed or performed in visit on 11/21/17   Comprehensive Metabolic Panel   Result Value Ref Range    Glucose 68 65 - 99 mg/dL    BUN 15 6 - 20 mg/dL    Creatinine 0.81 0.57 - 1.00 mg/dL    eGFR Non African Am 78 >60 mL/min/1.73    eGFR African Am 95 >60 mL/min/1.73    BUN/Creatinine Ratio 18.5 7.0 - 25.0    Sodium 140 136 - 145 mmol/L    Potassium 3.7 3.5 - 5.2 mmol/L    Chloride 101 98 - 107 mmol/L    Total CO2 26.2 22.0 - 29.0 mmol/L    Calcium 10.1 8.6 - 10.5 mg/dL    Total Protein 7.4 6.0 - 8.5 g/dL    Albumin 4.60 3.50 - 5.20 g/dL    Globulin 2.8 gm/dL    A/G Ratio 1.6 g/dL    Total Bilirubin 0.3 0.1 - 1.2 mg/dL    Alkaline Phosphatase 75 39 - 117 U/L    AST (SGOT) 15 1 - 32 U/L    ALT (SGPT) 16 1 - 33 U/L   Lipid Panel   Result Value Ref Range    Total Cholesterol 216 (H) 0 - 200 mg/dL    Triglycerides 209 (H) 0 - 150  mg/dL    HDL Cholesterol 51 40 - 60 mg/dL    VLDL Cholesterol 41.8 (H) 5 - 40 mg/dL    LDL Cholesterol  123 (H) 0 - 100 mg/dL   T4, Free   Result Value Ref Range    Free T4 0.70 (L) 0.93 - 1.70 ng/dL   Folate   Result Value Ref Range    Folate 8.62 4.78 - 24.20 ng/mL   TSH   Result Value Ref Range    TSH 18.210 (H) 0.270 - 4.200 mIU/mL   Vitamin D 25 Hydroxy   Result Value Ref Range    25 Hydroxy, Vitamin D 26.7 (L) 30.0 - 100.0 ng/mL   Vitamin B12   Result Value Ref Range    Vitamin B-12 205 (L) 211 - 946 pg/mL   CBC & Differential   Result Value Ref Range    WBC 10.06 4.50 - 10.70 10*3/mm3    RBC 4.47 3.90 - 5.20 10*6/mm3    Hemoglobin 14.0 11.9 - 15.5 g/dL    Hematocrit 42.4 35.6 - 45.5 %    MCV 94.9 80.5 - 98.2 fL    MCH 31.3 26.9 - 32.0 pg    MCHC 33.0 32.4 - 36.3 g/dL    RDW 13.5 (H) 11.7 - 13.0 %    Platelets 250 140 - 500 10*3/mm3    Neutrophil Rel % 55.3 42.7 - 76.0 %    Lymphocyte Rel % 35.3 19.6 - 45.3 %    Monocyte Rel % 6.3 5.0 - 12.0 %    Eosinophil Rel % 2.3 0.3 - 6.2 %    Basophil Rel % 0.6 0.0 - 1.5 %    Neutrophils Absolute 5.57 1.90 - 8.10 10*3/mm3    Lymphocytes Absolute 3.55 0.90 - 4.80 10*3/mm3    Monocytes Absolute 0.63 0.20 - 1.20 10*3/mm3    Eosinophils Absolute 0.23 0.00 - 0.70 10*3/mm3    Basophils Absolute 0.06 0.00 - 0.20 10*3/mm3    Immature Granulocyte Rel % 0.2 0.0 - 0.5 %    Immature Grans Absolute 0.02 0.00 - 0.03 10*3/mm3   Cindy JENNYFER Carine female 42 y.o. who presents today for follow up of Anxiety.  She reports medication is working well, patient desires to continue on Rx, and needs refill. Onset of symptoms was approximately several years ago.  She denies current suicidal and homicidal ideation. Risk factors are family history of anxiety and or depression and lifestyle of multiple roles, pain.  Previous treatment includes current Rx.  She complains of the following medication side effects: none.  The patient has had no previous counseling..    The patient has read and signed  the [facility] Controlled Substance Contract.  I will continue to see patient for regular follow up appointments.  They are well controlled on their medication.  LISA has been reviewed by me and is updated every 3 months. The patient is aware of the potential for addiction and dependence.  Needs refill Ultram for the chronic daily Chiari h/a's    I do have orders for labs  Weight is up  Will get B12 today    The following portions of the patient's history were reviewed and updated as appropriate: allergies, current medications, past family history, past medical history, past social history, past surgical history and problem list.    Review of Systems   Constitutional: Positive for fatigue and unexpected weight change. Negative for activity change and appetite change.   HENT: Positive for tinnitus. Negative for nosebleeds and trouble swallowing.    Eyes: Negative for pain and visual disturbance.   Respiratory: Negative for chest tightness, shortness of breath and wheezing.    Cardiovascular: Negative for chest pain and palpitations.   Gastrointestinal: Negative for abdominal pain and blood in stool.   Endocrine: Negative.    Genitourinary: Negative for difficulty urinating and hematuria.   Musculoskeletal: Positive for back pain, joint swelling, neck pain and neck stiffness.   Skin: Negative for color change and rash.   Allergic/Immunologic: Negative.    Neurological: Positive for dizziness, weakness, numbness and headaches. Negative for syncope and speech difficulty.   Hematological: Negative for adenopathy.   Psychiatric/Behavioral: Negative for agitation and confusion.   All other systems reviewed and are negative.      Objective   Physical Exam   Constitutional: She is oriented to person, place, and time. She appears well-developed and well-nourished. No distress.   HENT:   Head: Normocephalic and atraumatic.   Eyes: Conjunctivae and EOM are normal. Pupils are equal, round, and reactive to light. Right eye  exhibits no discharge. Left eye exhibits no discharge. No scleral icterus.   Neck: Normal range of motion. Neck supple. No tracheal deviation present. No thyromegaly present.   Cardiovascular: Normal rate, regular rhythm, normal heart sounds, intact distal pulses and normal pulses.  Exam reveals no gallop.    No murmur heard.  Pulmonary/Chest: Effort normal and breath sounds normal. No respiratory distress. She has no wheezes. She has no rales.   Musculoskeletal: Normal range of motion. She exhibits tenderness (trapezius bilat).   Neurological: She is alert and oriented to person, place, and time. She exhibits normal muscle tone. Coordination normal.   Skin: Skin is warm. No rash noted. No erythema. No pallor.   Psychiatric: She has a normal mood and affect. Her behavior is normal. Judgment and thought content normal.   Nursing note and vitals reviewed.      Assessment/Plan   Problems Addressed this Visit        Digestive    Vitamin B12 deficiency    Vitamin D deficiency disease       Endocrine    Acquired hypothyroidism - Primary       Nervous and Auditory    Peripheral neuropathy       Other    Anxiety      Other Visit Diagnoses    None.

## 2018-03-21 NOTE — PATIENT INSTRUCTIONS
Low glycemic index diet  Exercise 30 minutes most days of the week  Make sure you get results on any labs or tests we ordered today  We discussed medications and how to take them as prescribed  Sleep 6-8 hours each night if possible  If you have not signed up for [employer], please activate your code ASAP from your After Visit Summary today    LDL goal <100  LDL goal if heart disease <70  HDL goal >60  Triglyceride goal <150  BP goal =<130/80  Fasting glucose <100    Warned patient that this medication can cause drowsiness and impair them operating machinery, including driving a car.  Caution is advised.

## 2018-04-05 ENCOUNTER — OFFICE VISIT (OUTPATIENT)
Dept: FAMILY MEDICINE CLINIC | Facility: CLINIC | Age: 43
End: 2018-04-05

## 2018-04-05 VITALS
DIASTOLIC BLOOD PRESSURE: 72 MMHG | RESPIRATION RATE: 16 BRPM | TEMPERATURE: 98.2 F | OXYGEN SATURATION: 98 % | HEART RATE: 77 BPM | WEIGHT: 188 LBS | HEIGHT: 67 IN | BODY MASS INDEX: 29.51 KG/M2 | SYSTOLIC BLOOD PRESSURE: 104 MMHG

## 2018-04-05 DIAGNOSIS — J06.9 ACUTE URI: Primary | ICD-10-CM

## 2018-04-05 DIAGNOSIS — J20.9 ACUTE BRONCHITIS DUE TO INFECTION: ICD-10-CM

## 2018-04-05 DIAGNOSIS — R50.9 FEVER, UNSPECIFIED FEVER CAUSE: ICD-10-CM

## 2018-04-05 LAB
EXPIRATION DATE: NORMAL
FLUAV AG NPH QL: NEGATIVE
FLUBV AG NPH QL: NEGATIVE
INTERNAL CONTROL: NORMAL
Lab: NORMAL

## 2018-04-05 PROCEDURE — 99213 OFFICE O/P EST LOW 20 MIN: CPT | Performed by: PHYSICIAN ASSISTANT

## 2018-04-05 PROCEDURE — 87804 INFLUENZA ASSAY W/OPTIC: CPT | Performed by: PHYSICIAN ASSISTANT

## 2018-04-05 RX ORDER — AMOXICILLIN AND CLAVULANATE POTASSIUM 875; 125 MG/1; MG/1
1 TABLET, FILM COATED ORAL EVERY 12 HOURS SCHEDULED
Qty: 20 TABLET | Refills: 0 | Status: SHIPPED | OUTPATIENT
Start: 2018-04-05 | End: 2018-07-23

## 2018-04-05 RX ORDER — PREDNISONE 20 MG/1
20 TABLET ORAL 2 TIMES DAILY
Qty: 10 TABLET | Refills: 0 | Status: SHIPPED | OUTPATIENT
Start: 2018-04-05 | End: 2018-07-23

## 2018-04-05 RX ORDER — DEXTROMETHORPHAN HYDROBROMIDE AND PROMETHAZINE HYDROCHLORIDE 15; 6.25 MG/5ML; MG/5ML
5 SYRUP ORAL 4 TIMES DAILY PRN
Qty: 180 ML | Refills: 1 | Status: SHIPPED | OUTPATIENT
Start: 2018-04-05 | End: 2018-07-23

## 2018-04-05 RX ORDER — ALBUTEROL SULFATE 90 UG/1
2 AEROSOL, METERED RESPIRATORY (INHALATION) EVERY 4 HOURS PRN
Qty: 1 INHALER | Refills: 11 | Status: SHIPPED | OUTPATIENT
Start: 2018-04-05 | End: 2018-07-23

## 2018-04-05 NOTE — PROGRESS NOTES
Subjective   Cindy Hawk is a 42 y.o. female.     History of Present Illness   Cindy Hawk 42 y.o. female who presents for evaluation of upper respiratory congestion, fever, wheezing, shortness of air. Symptoms include congestion, sneezing, post nasal drip, cough described as productive of green sputum, runny nose, itchy eyes, shortness of breath, wheezing and fatigue.  Onset of symptoms was 2 days ago, gradually worsening since that time. Patient denies diarrhea.   Evaluation to date: none Treatment to date:  OTC Afrin, OTC cough suppressant and Advil. Has been taking Augmentin for 2 days    Fever started last   Body aches    The following portions of the patient's history were reviewed and updated as appropriate: allergies, current medications, past family history, past medical history, past social history, past surgical history and problem list.    Review of Systems   Constitutional: Positive for activity change, appetite change, fatigue and fever. Negative for unexpected weight change.   HENT: Positive for postnasal drip, rhinorrhea, sinus pain, sinus pressure, sneezing and tinnitus. Negative for nosebleeds and trouble swallowing.    Eyes: Positive for itching. Negative for pain and visual disturbance.   Respiratory: Negative for chest tightness, shortness of breath and wheezing.    Cardiovascular: Negative for chest pain and palpitations.   Gastrointestinal: Negative for abdominal pain and blood in stool.   Endocrine: Negative.    Genitourinary: Negative for difficulty urinating and hematuria.   Musculoskeletal: Positive for neck pain and neck stiffness. Negative for joint swelling.   Skin: Negative for color change and rash.   Allergic/Immunologic: Negative.    Neurological: Positive for dizziness, light-headedness, numbness and headaches. Negative for syncope and speech difficulty.   Hematological: Negative for adenopathy.   Psychiatric/Behavioral: Negative for agitation and confusion.    All other systems reviewed and are negative.      Objective   Physical Exam   Constitutional: She is oriented to person, place, and time. She appears well-developed and well-nourished.  Non-toxic appearance. No distress.   HENT:   Head: Normocephalic and atraumatic. Hair is normal.   Right Ear: External ear normal. No drainage, swelling or tenderness. Tympanic membrane is retracted.   Left Ear: External ear normal. No drainage, swelling or tenderness. Tympanic membrane is retracted.   Nose: Mucosal edema present. No epistaxis.   Mouth/Throat: Uvula is midline and mucous membranes are normal. No oral lesions. No uvula swelling. Posterior oropharyngeal erythema present. No oropharyngeal exudate.   Eyes: Conjunctivae and EOM are normal. Pupils are equal, round, and reactive to light. Right eye exhibits no discharge. Left eye exhibits no discharge. No scleral icterus.   Neck: Normal range of motion. Neck supple.   Cardiovascular: Normal rate, regular rhythm and normal heart sounds.  Exam reveals no gallop.    No murmur heard.  Pulmonary/Chest: No stridor. No respiratory distress. She has wheezes (deep bronchial cough; she is smoker). She has no rales. She exhibits no tenderness.   Abdominal: Soft. There is no tenderness.   Lymphadenopathy:     She has cervical adenopathy.   Neurological: She is alert and oriented to person, place, and time. She exhibits normal muscle tone.   Skin: Skin is warm and dry. No rash noted. She is not diaphoretic.   Psychiatric: She has a normal mood and affect. Her behavior is normal. Judgment and thought content normal.   Nursing note and vitals reviewed.      Assessment/Plan   Cindy was seen today for uri.    Diagnoses and all orders for this visit:    Acute URI  -     POC Influenza A / B    Fever, unspecified fever cause    Acute bronchitis due to infection

## 2018-04-09 RX ORDER — AZITHROMYCIN 250 MG/1
TABLET, FILM COATED ORAL
Qty: 6 TABLET | Refills: 1 | Status: SHIPPED | OUTPATIENT
Start: 2018-04-09 | End: 2018-07-23

## 2018-04-20 ENCOUNTER — CLINICAL SUPPORT (OUTPATIENT)
Dept: FAMILY MEDICINE CLINIC | Facility: CLINIC | Age: 43
End: 2018-04-20

## 2018-04-20 DIAGNOSIS — E53.8 VITAMIN B12 DEFICIENCY: Primary | ICD-10-CM

## 2018-04-20 DIAGNOSIS — E53.8 B12 DEFICIENCY: ICD-10-CM

## 2018-04-20 PROCEDURE — 96372 THER/PROPH/DIAG INJ SC/IM: CPT | Performed by: PHYSICIAN ASSISTANT

## 2018-04-20 RX ORDER — CYANOCOBALAMIN 1000 UG/ML
1000 INJECTION, SOLUTION INTRAMUSCULAR; SUBCUTANEOUS
Status: DISCONTINUED | OUTPATIENT
Start: 2018-04-20 | End: 2020-06-10

## 2018-04-20 RX ADMIN — CYANOCOBALAMIN 1000 MCG: 1000 INJECTION, SOLUTION INTRAMUSCULAR; SUBCUTANEOUS at 15:24

## 2018-05-07 ENCOUNTER — TELEPHONE (OUTPATIENT)
Dept: NEUROSURGERY | Facility: CLINIC | Age: 43
End: 2018-05-07

## 2018-05-07 NOTE — TELEPHONE ENCOUNTER
Called patient because she missed her appt on 04/30/2018. LVM to call back and reschedule. Please remind her to get MRI done before her next appt. She needs to schedule with Dr. Slade per yolanda's last office note.

## 2018-06-06 ENCOUNTER — CLINICAL SUPPORT (OUTPATIENT)
Dept: FAMILY MEDICINE CLINIC | Facility: CLINIC | Age: 43
End: 2018-06-06

## 2018-06-06 DIAGNOSIS — E53.8 VITAMIN B12 DEFICIENCY: Primary | ICD-10-CM

## 2018-06-06 PROCEDURE — 96372 THER/PROPH/DIAG INJ SC/IM: CPT | Performed by: PHYSICIAN ASSISTANT

## 2018-06-06 RX ADMIN — CYANOCOBALAMIN 1000 MCG: 1000 INJECTION, SOLUTION INTRAMUSCULAR; SUBCUTANEOUS at 09:04

## 2018-06-20 ENCOUNTER — TELEPHONE (OUTPATIENT)
Dept: NEUROSURGERY | Facility: CLINIC | Age: 43
End: 2018-06-20

## 2018-06-20 NOTE — TELEPHONE ENCOUNTER
We have tried to contact patient on 05/07/2018 and 06/20/2018. We mailed her a letter on 05/14/2018. We are contacting her regarding getting a Cervical and brain MRI and then following up with Dr. Slade afterwards. She was last seen in our office on 01/16/2018 and per Kami Lee office note, she was suppose to return after imaging was complete. She was scheduled with Dr. Slade on 02/26/2018 but he was out of the office and she had not had imaging done yet. She then was scheduled with him on 03/30/2018 and no showed that appointment. She has failed to contact the office.

## 2018-06-20 NOTE — TELEPHONE ENCOUNTER
Called and LVM for patient to call the office back. She needs to have Cervical and Brain MRI and then needs to be scheduled with Dr. Slade.

## 2018-07-13 ENCOUNTER — CLINICAL SUPPORT (OUTPATIENT)
Dept: FAMILY MEDICINE CLINIC | Facility: CLINIC | Age: 43
End: 2018-07-13

## 2018-07-13 DIAGNOSIS — E53.8 VITAMIN B12 DEFICIENCY: Primary | ICD-10-CM

## 2018-07-13 PROCEDURE — 96372 THER/PROPH/DIAG INJ SC/IM: CPT | Performed by: PHYSICIAN ASSISTANT

## 2018-07-13 RX ADMIN — CYANOCOBALAMIN 1000 MCG: 1000 INJECTION, SOLUTION INTRAMUSCULAR; SUBCUTANEOUS at 09:15

## 2018-07-23 ENCOUNTER — OFFICE VISIT (OUTPATIENT)
Dept: FAMILY MEDICINE CLINIC | Facility: CLINIC | Age: 43
End: 2018-07-23

## 2018-07-23 VITALS
HEART RATE: 76 BPM | RESPIRATION RATE: 16 BRPM | TEMPERATURE: 98.3 F | OXYGEN SATURATION: 98 % | SYSTOLIC BLOOD PRESSURE: 110 MMHG | BODY MASS INDEX: 27.94 KG/M2 | WEIGHT: 178 LBS | HEIGHT: 67 IN | DIASTOLIC BLOOD PRESSURE: 70 MMHG

## 2018-07-23 DIAGNOSIS — G62.9 PERIPHERAL POLYNEUROPATHY: ICD-10-CM

## 2018-07-23 DIAGNOSIS — E55.9 VITAMIN D DEFICIENCY DISEASE: ICD-10-CM

## 2018-07-23 DIAGNOSIS — E78.2 MIXED HYPERLIPIDEMIA: ICD-10-CM

## 2018-07-23 DIAGNOSIS — F41.9 ANXIETY: ICD-10-CM

## 2018-07-23 DIAGNOSIS — G93.5 ARNOLD-CHIARI MALFORMATION, TYPE I (HCC): ICD-10-CM

## 2018-07-23 DIAGNOSIS — E53.8 VITAMIN B12 DEFICIENCY: ICD-10-CM

## 2018-07-23 DIAGNOSIS — R51.9 ACUTE NONINTRACTABLE HEADACHE, UNSPECIFIED HEADACHE TYPE: ICD-10-CM

## 2018-07-23 DIAGNOSIS — E03.9 ACQUIRED HYPOTHYROIDISM: Primary | ICD-10-CM

## 2018-07-23 PROCEDURE — 99214 OFFICE O/P EST MOD 30 MIN: CPT | Performed by: PHYSICIAN ASSISTANT

## 2018-07-23 RX ORDER — TRAMADOL HYDROCHLORIDE 50 MG/1
50 TABLET ORAL EVERY 6 HOURS PRN
Qty: 90 TABLET | Refills: 2 | Status: SHIPPED | OUTPATIENT
Start: 2018-07-23 | End: 2018-11-01 | Stop reason: SDUPTHER

## 2018-07-23 RX ORDER — SERTRALINE HYDROCHLORIDE 100 MG/1
TABLET, FILM COATED ORAL
Qty: 45 TABLET | Refills: 11 | Status: SHIPPED | OUTPATIENT
Start: 2018-07-23 | End: 2019-06-04 | Stop reason: SDUPTHER

## 2018-07-23 NOTE — PROGRESS NOTES
Subjective   Cindy Hawk is a 42 y.o. female.     History of Present Illness   Cindy Hawk 42 y.o. female who presents today for routine follow up check and medication refills.  she has a history of   Patient Active Problem List   Diagnosis   • Vitamin B12 deficiency   • Vitamin D deficiency disease   • Acquired hypothyroidism   • Arnold-Chiari malformation, type I (CMS/HCC)   • Neck pain   • Peripheral neuropathy   • Headache   • Anxiety   • Hyperlipidemia   • Arthritis   .  Since the last visit, she has overall felt tired.  She has Hypothyroidism and will need to update labs for continued treatment, Vitamin D deficiency and will update labs to confirm level is at goal >30 and B12 def and on injections. .  she has been compliant with current medications have reviewed them.  The patient denies medication side effects.  I do need thyroid labs!!!!!  Results for orders placed or performed in visit on 04/05/18   POC Influenza A / B   Result Value Ref Range    Rapid Influenza A Ag negative     Rapid Influenza B Ag negative     Internal Control Passed Passed    Lot Number [id number]     Expiration Date 08/01/2020      She is still having h/a's and blurry vision;  and does have appt Dr. Piña.  She still needs that I    Had B12 shot last week  Still needs labs and has been coming in monthly  Still having N/T hands and feet    Cindy Hawk female 42 y.o. who presents today for follow up of Anxiety.  She reports medication is working well, patient desires to continue on Rx, and needs refill. Onset of symptoms was approximately several years ago.  She denies current suicidal and homicidal ideation. Risk factors are family history of anxiety and or depression and lifestyle of multiple roles.  Previous treatment includes current Rx.  She complains of the following medication side effects: none.  The patient declines to go to counseling..  Lab Results   Component Value Date    BUN 15 11/21/2017     CREATININE 0.81 11/21/2017    EGFRIFNONA 78 11/21/2017    EGFRIFAFRI 95 11/21/2017    BCR 18.5 11/21/2017    K 3.7 11/21/2017    CO2 26.2 11/21/2017    CALCIUM 10.1 11/21/2017    PROTENTOTREF 7.4 11/21/2017    ALBUMIN 4.60 11/21/2017    LABIL2 1.6 11/21/2017    AST 15 11/21/2017    ALT 16 11/21/2017     Lab Results   Component Value Date    VGCZTQTC40 205 (L) 11/21/2017     Lab Results   Component Value Date    TSH 18.210 (H) 11/21/2017        The patient has read and signed the [facility] Controlled Substance Contract.  I will continue to see patient for regular follow up appointments.  They are well controlled on their medication.  LISA has been reviewed by me and is updated every 3 months. The patient is aware of the potential for addiction and dependence.  Needs refill Ultram for the chronic daily Chiari h/a's     Patient denies having a history of substance abuse or addiction to alcohol.  She does however, have a family history of addiction with her father.    The following portions of the patient's history were reviewed and updated as appropriate: allergies, current medications, past family history, past medical history, past social history, past surgical history and problem list.    Review of Systems   Constitutional: Positive for fatigue. Negative for activity change, appetite change and unexpected weight change.   HENT: Positive for tinnitus. Negative for nosebleeds and trouble swallowing.    Eyes: Negative for pain and visual disturbance.   Respiratory: Negative for chest tightness, shortness of breath and wheezing.    Cardiovascular: Negative for chest pain and palpitations.   Gastrointestinal: Negative for abdominal pain and blood in stool.   Endocrine: Negative.    Genitourinary: Negative for difficulty urinating and hematuria.   Musculoskeletal: Positive for back pain, neck pain and neck stiffness. Negative for joint swelling.   Skin: Negative for color change and rash.   Allergic/Immunologic:  Negative.    Neurological: Positive for dizziness, tremors, weakness, light-headedness, numbness and headaches. Negative for syncope and speech difficulty.   Hematological: Negative for adenopathy.   Psychiatric/Behavioral: Positive for decreased concentration. Negative for agitation and confusion.   All other systems reviewed and are negative.      Objective   Physical Exam   Constitutional: She is oriented to person, place, and time. She appears well-developed and well-nourished. No distress.   HENT:   Head: Normocephalic and atraumatic.   Eyes: Pupils are equal, round, and reactive to light. Conjunctivae and EOM are normal. Right eye exhibits no discharge. Left eye exhibits no discharge. No scleral icterus.   Neck: Normal range of motion. Neck supple. No tracheal deviation present. No thyromegaly present.   Cardiovascular: Normal rate, regular rhythm, normal heart sounds, intact distal pulses and normal pulses.  Exam reveals no gallop.    No murmur heard.  Pulmonary/Chest: Effort normal and breath sounds normal. No respiratory distress. She has no wheezes. She has no rales.   Musculoskeletal: Normal range of motion. She exhibits tenderness (trapezius bilat).   Neurological: She is alert and oriented to person, place, and time. She exhibits normal muscle tone. Coordination normal.   Skin: Skin is warm. No rash noted. No erythema. No pallor.   Psychiatric: She has a normal mood and affect. Her behavior is normal. Judgment and thought content normal.   Nursing note and vitals reviewed.      Assessment/Plan   Problems Addressed this Visit        Cardiovascular and Mediastinum    Hyperlipidemia       Digestive    Vitamin B12 deficiency    Vitamin D deficiency disease       Endocrine    Acquired hypothyroidism - Primary       Nervous and Auditory    Arnold-Chiari malformation, type I (CMS/HCC)    Peripheral neuropathy    Headache       Other    Anxiety

## 2018-07-23 NOTE — PATIENT INSTRUCTIONS
Low glycemic index diet  Exercise 30 minutes most days of the week  Make sure you get results on any labs or tests we ordered today  We discussed medications and how to take them as prescribed  Sleep 6-8 hours each night if possible  If you have not signed up for [employer], please activate your code ASAP from your After Visit Summary today    LDL goal <100  LDL goal if heart disease <70  HDL goal >60  Triglyceride goal <150  BP goal =<130/80  Fasting glucose <100    Warned patient that this medication can cause drowsiness and impair them operating machinery, including driving a car.  Caution is advised.

## 2018-08-22 ENCOUNTER — CLINICAL SUPPORT (OUTPATIENT)
Dept: FAMILY MEDICINE CLINIC | Facility: CLINIC | Age: 43
End: 2018-08-22

## 2018-08-22 DIAGNOSIS — E53.8 VITAMIN B12 DEFICIENCY: Primary | ICD-10-CM

## 2018-08-22 PROCEDURE — 96372 THER/PROPH/DIAG INJ SC/IM: CPT | Performed by: PHYSICIAN ASSISTANT

## 2018-08-22 RX ADMIN — CYANOCOBALAMIN 1000 MCG: 1000 INJECTION, SOLUTION INTRAMUSCULAR; SUBCUTANEOUS at 09:18

## 2018-09-18 ENCOUNTER — CLINICAL SUPPORT (OUTPATIENT)
Dept: FAMILY MEDICINE CLINIC | Facility: CLINIC | Age: 43
End: 2018-09-18

## 2018-09-18 DIAGNOSIS — E53.8 VITAMIN B12 DEFICIENCY: Primary | ICD-10-CM

## 2018-09-18 PROCEDURE — 96372 THER/PROPH/DIAG INJ SC/IM: CPT | Performed by: PHYSICIAN ASSISTANT

## 2018-09-18 RX ADMIN — CYANOCOBALAMIN 1000 MCG: 1000 INJECTION, SOLUTION INTRAMUSCULAR; SUBCUTANEOUS at 14:34

## 2018-11-01 ENCOUNTER — OFFICE VISIT (OUTPATIENT)
Dept: FAMILY MEDICINE CLINIC | Facility: CLINIC | Age: 43
End: 2018-11-01

## 2018-11-01 VITALS
SYSTOLIC BLOOD PRESSURE: 114 MMHG | HEART RATE: 70 BPM | WEIGHT: 175 LBS | OXYGEN SATURATION: 98 % | RESPIRATION RATE: 16 BRPM | BODY MASS INDEX: 27.47 KG/M2 | HEIGHT: 67 IN | DIASTOLIC BLOOD PRESSURE: 80 MMHG | TEMPERATURE: 98.3 F

## 2018-11-01 DIAGNOSIS — F41.9 ANXIETY: ICD-10-CM

## 2018-11-01 DIAGNOSIS — E55.9 VITAMIN D DEFICIENCY DISEASE: ICD-10-CM

## 2018-11-01 DIAGNOSIS — E78.2 MIXED HYPERLIPIDEMIA: ICD-10-CM

## 2018-11-01 DIAGNOSIS — E53.8 VITAMIN B12 DEFICIENCY: ICD-10-CM

## 2018-11-01 DIAGNOSIS — Z00.00 LABORATORY EXAMINATION ORDERED AS PART OF A ROUTINE GENERAL MEDICAL EXAMINATION: ICD-10-CM

## 2018-11-01 DIAGNOSIS — E03.9 ACQUIRED HYPOTHYROIDISM: ICD-10-CM

## 2018-11-01 DIAGNOSIS — R51.9 CHRONIC NONINTRACTABLE HEADACHE, UNSPECIFIED HEADACHE TYPE: ICD-10-CM

## 2018-11-01 DIAGNOSIS — G89.29 CHRONIC NONINTRACTABLE HEADACHE, UNSPECIFIED HEADACHE TYPE: ICD-10-CM

## 2018-11-01 DIAGNOSIS — R11.2 NAUSEA AND VOMITING IN ADULT: Primary | ICD-10-CM

## 2018-11-01 DIAGNOSIS — M54.2 NECK PAIN: ICD-10-CM

## 2018-11-01 PROCEDURE — 96372 THER/PROPH/DIAG INJ SC/IM: CPT | Performed by: PHYSICIAN ASSISTANT

## 2018-11-01 PROCEDURE — 99214 OFFICE O/P EST MOD 30 MIN: CPT | Performed by: PHYSICIAN ASSISTANT

## 2018-11-01 RX ORDER — HYDROCODONE BITARTRATE AND ACETAMINOPHEN 5; 325 MG/1; MG/1
TABLET ORAL
Refills: 0 | COMMUNITY
Start: 2018-10-19 | End: 2018-11-05

## 2018-11-01 RX ORDER — PROMETHAZINE HYDROCHLORIDE 25 MG/ML
25 INJECTION, SOLUTION INTRAMUSCULAR; INTRAVENOUS ONCE
Status: COMPLETED | OUTPATIENT
Start: 2018-11-01 | End: 2018-11-01

## 2018-11-01 RX ORDER — TRAMADOL HYDROCHLORIDE 50 MG/1
50 TABLET ORAL EVERY 6 HOURS PRN
Qty: 90 TABLET | Refills: 2 | Status: SHIPPED | OUTPATIENT
Start: 2018-11-01 | End: 2019-02-13 | Stop reason: SDUPTHER

## 2018-11-01 RX ORDER — PROMETHAZINE HYDROCHLORIDE 25 MG/1
25 TABLET ORAL EVERY 6 HOURS PRN
Qty: 20 TABLET | Refills: 1 | Status: SHIPPED | OUTPATIENT
Start: 2018-11-01 | End: 2018-11-02

## 2018-11-01 RX ORDER — OMEPRAZOLE 20 MG/1
20 TABLET, DELAYED RELEASE ORAL DAILY
Qty: 14 TABLET | Refills: 0 | Status: SHIPPED | OUTPATIENT
Start: 2018-11-01 | End: 2019-02-13

## 2018-11-01 RX ORDER — PROMETHAZINE HYDROCHLORIDE 25 MG/1
25 SUPPOSITORY RECTAL EVERY 6 HOURS PRN
Qty: 20 SUPPOSITORY | Refills: 1 | Status: SHIPPED | OUTPATIENT
Start: 2018-11-01 | End: 2018-11-02

## 2018-11-01 RX ADMIN — PROMETHAZINE HYDROCHLORIDE 25 MG: 25 INJECTION, SOLUTION INTRAMUSCULAR; INTRAVENOUS at 11:37

## 2018-11-01 NOTE — PATIENT INSTRUCTIONS
Clear Liquid Diet, Adult  A clear liquid diet is a diet that includes only liquids that you can see through. You may need to follow a clear liquid diet if:  · You develop a medical condition right before or after you have surgery.  · You were not able to eat food for a long period of time.  · You had a condition that gave you diarrhea.  · You are going to have an exam, such as a colonoscopy, in which instruments will be put into your body to look at parts of your digestive system.  · You are going to have bowel surgery.    Clear liquid diet (broth, pop redd, jello, Gatorade, Ginger ale/Sprite) for 24 hours then bland diet for a few days  Avoid Dairy and fried foods for a week  Probiotics like Align or Jennifer Q every day for 30 days  Increase fluids (need to make sure you voiding regularly)  Will refer to a Gastroenterologist if continue symptoms after the next 48 hours (contact office)  If worse, go to ER  Warned patient that this medication can cause drowsiness and impair them operating machinery, including driving a car.  Caution is advised.    The usual goals of this diet are:  · To rest the stomach and digestive system as much as possible.  · To keep you hydrated.  · To make sure you get some calories for energy.  · To help you return to normal digestion.    Most people need to follow this diet for only a short period of time.  What do I need to know about this diet?  · A clear liquid is a liquid that you can see through when you hold it up to a light.  · A clear liquid diet does not provide all the nutrients that you need. It is important to choose a variety of the liquids that are allowed on this diet. That way, you will get as many nutrients as possible.  · If you are not sure whether you can have certain items, ask your health care provider.  What can I have?  · Water and flavored water.  · Fruit juices that do not have pulp, such as cranberry juice and apple juice.  · Tea and coffee without milk or  cream.  · Clear bouillon or broth.  · Broth-based soups that have been strained.  · Flavored gelatins.  · Honey.  · Sugar water.  · Frozen ice or frozen ice pops that do not contain milk, yogurt, fruit pieces, or fruit pulp.  · Clear sodas.  · Clear sports drinks.  The items listed above may not be a complete list of recommended liquids. Contact your dietitian for more options.  What can I not have?  · Juices that have pulp.  · Milk.  · Cream or cream-based soups.  · Yogurt.  The items listed above may not be a complete list of liquids to avoid. Contact your dietitian for more information.  Summary  · A clear liquid diet is a diet that includes only liquids that you can see through.  · The goal of this diet is to help you recover by resting your digestive system, keeping you hydrated, and providing nutrients.  · Make sure to avoid liquids with milk, cream, or pulp while on this diet.  This information is not intended to replace advice given to you by your health care provider. Make sure you discuss any questions you have with your health care provider.  Document Released: 12/18/2006 Document Revised: 08/01/2017 Document Reviewed: 11/14/2014  [employer] Patient Education © 2018 [employer]

## 2018-11-01 NOTE — PROGRESS NOTES
Subjective   Cindy Hawk is a 43 y.o. female.     History of Present Illness   Cindy Hawk 43 y.o. female who presents for evaluation of nausea and dizziness, lightheaded and vomited 3-4 times yesterday; got better and ate last PM slept last PM and started with N/V again this AM. Symptoms have been present for 2 days .Has vomited about 8 times today.  Suspect virus; no fever or abd pain; will do further work up if not resolving.  No diarrhea.  NO blood or black stool;  I plan to have her on PPI for 2 weeks.  The condition is aggravated by thinking of food and nausea with liquids . she is experiencing nausea.  Alleviating factors are nothing with no change in symptoms . Patient denies fever, diarrhea, constipation, change in stools, GI symptoms waking them up, melena, bright red blood in stool and reflux her past medical history is notable for no prior issures.  Patient denies recent travel.      N/V once last week    I will order Phenergan 25mg IM now    Cindy Hawk 43 y.o. female who presents today for routine follow up check and medication refills.  she has a history of   Patient Active Problem List   Diagnosis   • Vitamin B12 deficiency   • Vitamin D deficiency disease   • Acquired hypothyroidism   • Arnold-Chiari malformation, type I (CMS/HCC)   • Neck pain   • Peripheral neuropathy   • Headache   • Anxiety   • Hyperlipidemia   • Arthritis   .  Since the last visit, she has overall felt well until recent illness.  She has Hyperlipidemia and working on this with diet and exercise, Hypothyroidism and will need to update labs for continued treatment and Vitamin D deficiency and will update labs to confirm level is at goal >30.  she has been compliant with current medications have reviewed them.  The patient denies medication side effects.  On B12  Zoloft still working for anxiety  Has chronic h/a and still want neurology consult    Results for orders placed or performed in visit on 04/05/18    POC Influenza A / B   Result Value Ref Range    Rapid Influenza A Ag negative     Rapid Influenza B Ag negative     Internal Control Passed Passed    Lot Number [id number]     Expiration Date 08/01/2020      She is on Ultram for the h/a pain and neck pain and The patient has read and signed the [facility] Controlled Substance Contract.  I will continue to see patient for regular follow up appointments.  They are well controlled on their medication.  LISA has been reviewed by me and is updated every 3 months. The patient is aware of the potential for addiction and dependence.    On period now; not planning pregnancy    The following portions of the patient's history were reviewed and updated as appropriate: allergies, current medications, past family history, past medical history, past social history, past surgical history and problem list.    Review of Systems   Constitutional: Positive for fatigue. Negative for activity change, appetite change, fever and unexpected weight change.   HENT: Negative for nosebleeds and trouble swallowing.    Eyes: Negative for pain and visual disturbance.   Respiratory: Negative for chest tightness, shortness of breath and wheezing.    Cardiovascular: Negative for chest pain and palpitations.   Gastrointestinal: Positive for nausea and vomiting. Negative for abdominal pain and blood in stool.   Endocrine: Negative.    Genitourinary: Negative for difficulty urinating and hematuria.   Musculoskeletal: Negative for joint swelling.   Skin: Negative for color change and rash.   Allergic/Immunologic: Negative.    Neurological: Negative for syncope and speech difficulty.   Hematological: Negative for adenopathy.   Psychiatric/Behavioral: Negative for agitation and confusion.   All other systems reviewed and are negative.      Objective   Physical Exam   Constitutional: She is oriented to person, place, and time. She appears well-developed and well-nourished. No distress.   HENT:    Head: Normocephalic and atraumatic.   Eyes: Pupils are equal, round, and reactive to light. Conjunctivae and EOM are normal. Right eye exhibits no discharge. Left eye exhibits no discharge. No scleral icterus.   Neck: Normal range of motion. Neck supple. No tracheal deviation present. No thyromegaly present.   Cardiovascular: Normal rate, regular rhythm, normal heart sounds, intact distal pulses and normal pulses.  Exam reveals no gallop.    No murmur heard.  Pulmonary/Chest: Effort normal and breath sounds normal. No respiratory distress. She has no wheezes. She has no rales.   Musculoskeletal: Normal range of motion. She exhibits tenderness (trapezius bilat).   Neurological: She is alert and oriented to person, place, and time. She exhibits normal muscle tone. Coordination normal.   Skin: Skin is warm. No rash noted. No erythema. No pallor.   Psychiatric: She has a normal mood and affect. Her behavior is normal. Judgment and thought content normal.   Nursing note and vitals reviewed.      Assessment/Plan   Cindy was seen today for nausea and vomiting.    Diagnoses and all orders for this visit:    Nausea and vomiting in adult  -     Comprehensive metabolic panel  -     Lipid panel  -     CBC and Differential  -     TSH  -     Hemoglobin A1c  -     T3, Free  -     T4, Free  -     Vitamin B12  -     Folate  -     Vitamin D 25 Hydroxy  -     Amylase  -     Lipase    Vitamin D deficiency disease  -     Comprehensive metabolic panel  -     Lipid panel  -     CBC and Differential  -     TSH  -     Hemoglobin A1c  -     T3, Free  -     T4, Free  -     Vitamin B12  -     Folate  -     Vitamin D 25 Hydroxy  -     Amylase  -     Lipase    Vitamin B12 deficiency  -     Comprehensive metabolic panel  -     Lipid panel  -     CBC and Differential  -     TSH  -     Hemoglobin A1c  -     T3, Free  -     T4, Free  -     Vitamin B12  -     Folate  -     Vitamin D 25 Hydroxy  -     Amylase  -     Lipase    Acquired  hypothyroidism  -     Comprehensive metabolic panel  -     Lipid panel  -     CBC and Differential  -     TSH  -     Hemoglobin A1c  -     T3, Free  -     T4, Free  -     Vitamin B12  -     Folate  -     Vitamin D 25 Hydroxy  -     Amylase  -     Lipase    Mixed hyperlipidemia  -     Comprehensive metabolic panel  -     Lipid panel  -     CBC and Differential  -     TSH  -     Hemoglobin A1c  -     T3, Free  -     T4, Free  -     Vitamin B12  -     Folate  -     Vitamin D 25 Hydroxy  -     Amylase  -     Lipase    Anxiety  -     Comprehensive metabolic panel  -     Lipid panel  -     CBC and Differential  -     TSH  -     Hemoglobin A1c  -     T3, Free  -     T4, Free  -     Vitamin B12  -     Folate  -     Vitamin D 25 Hydroxy  -     Amylase  -     Lipase    Neck pain  -     Comprehensive metabolic panel  -     Lipid panel  -     CBC and Differential  -     TSH  -     Hemoglobin A1c  -     T3, Free  -     T4, Free  -     Vitamin B12  -     Folate  -     Vitamin D 25 Hydroxy  -     Amylase  -     Lipase    Chronic nonintractable headache, unspecified headache type  -     Comprehensive metabolic panel  -     Lipid panel  -     CBC and Differential  -     TSH  -     Hemoglobin A1c  -     T3, Free  -     T4, Free  -     Vitamin B12  -     Folate  -     Vitamin D 25 Hydroxy  -     Amylase  -     Lipase    Laboratory examination ordered as part of a routine general medical examination  -     Comprehensive metabolic panel  -     Lipid panel  -     CBC and Differential  -     TSH  -     Hemoglobin A1c  -     T3, Free  -     T4, Free  -     Vitamin B12  -     Folate  -     Vitamin D 25 Hydroxy  -     Amylase  -     Lipase    Other orders  -     promethazine (PHENERGAN) 25 MG suppository; Insert 1 suppository into the rectum Every 6 (Six) Hours As Needed for Nausea or Vomiting.  -     omeprazole OTC (PRILOSEC OTC) 20 MG EC tablet; Take 1 tablet by mouth Daily. For 2 weeks for acute gastritis

## 2018-11-02 ENCOUNTER — HOSPITAL ENCOUNTER (EMERGENCY)
Facility: HOSPITAL | Age: 43
Discharge: HOME | End: 2018-11-02
Attending: EMERGENCY MEDICINE | Admitting: EMERGENCY MEDICINE

## 2018-11-02 VITALS
TEMPERATURE: 98.8 F | DIASTOLIC BLOOD PRESSURE: 65 MMHG | WEIGHT: 175 LBS | HEIGHT: 66 IN | BODY MASS INDEX: 28.12 KG/M2 | SYSTOLIC BLOOD PRESSURE: 112 MMHG | RESPIRATION RATE: 16 BRPM | HEART RATE: 68 BPM | OXYGEN SATURATION: 96 %

## 2018-11-02 DIAGNOSIS — R11.2 NON-INTRACTABLE VOMITING WITH NAUSEA, UNSPECIFIED VOMITING TYPE: Primary | ICD-10-CM

## 2018-11-02 LAB
ALBUMIN SERPL-MCNC: 4.7 G/DL (ref 3.5–5.2)
ALBUMIN/GLOB SERPL: 1.4 G/DL
ALP SERPL-CCNC: 68 U/L (ref 39–117)
ALT SERPL W P-5'-P-CCNC: 12 U/L (ref 1–33)
ANION GAP SERPL CALCULATED.3IONS-SCNC: 11.2 MMOL/L
AST SERPL-CCNC: 9 U/L (ref 1–32)
BASOPHILS # BLD AUTO: 0.02 10*3/MM3 (ref 0–0.2)
BASOPHILS NFR BLD AUTO: 0.2 % (ref 0–1.5)
BILIRUB SERPL-MCNC: 0.5 MG/DL (ref 0.1–1.2)
BILIRUB UR QL STRIP: NEGATIVE
BUN BLD-MCNC: 12 MG/DL (ref 6–20)
BUN/CREAT SERPL: 14.1 (ref 7–25)
CALCIUM SPEC-SCNC: 9.6 MG/DL (ref 8.6–10.5)
CHLORIDE SERPL-SCNC: 101 MMOL/L (ref 98–107)
CLARITY UR: CLEAR
CO2 SERPL-SCNC: 26.8 MMOL/L (ref 22–29)
COLOR UR: YELLOW
CREAT BLD-MCNC: 0.85 MG/DL (ref 0.57–1)
DEPRECATED RDW RBC AUTO: 42.8 FL (ref 37–54)
EOSINOPHIL # BLD AUTO: 0.01 10*3/MM3 (ref 0–0.7)
EOSINOPHIL NFR BLD AUTO: 0.1 % (ref 0.3–6.2)
ERYTHROCYTE [DISTWIDTH] IN BLOOD BY AUTOMATED COUNT: 12.9 % (ref 11.7–13)
GFR SERPL CREATININE-BSD FRML MDRD: 74 ML/MIN/1.73
GLOBULIN UR ELPH-MCNC: 3.3 GM/DL
GLUCOSE BLD-MCNC: 137 MG/DL (ref 65–99)
GLUCOSE UR STRIP-MCNC: NEGATIVE MG/DL
HCG SERPL QL: NEGATIVE
HCT VFR BLD AUTO: 42.9 % (ref 35.6–45.5)
HGB BLD-MCNC: 14.6 G/DL (ref 11.9–15.5)
HGB UR QL STRIP.AUTO: NEGATIVE
HOLD SPECIMEN: NORMAL
HOLD SPECIMEN: NORMAL
IMM GRANULOCYTES # BLD: 0.01 10*3/MM3 (ref 0–0.03)
IMM GRANULOCYTES NFR BLD: 0.1 % (ref 0–0.5)
KETONES UR QL STRIP: ABNORMAL
LEUKOCYTE ESTERASE UR QL STRIP.AUTO: NEGATIVE
LIPASE SERPL-CCNC: 24 U/L (ref 13–60)
LYMPHOCYTES # BLD AUTO: 1.79 10*3/MM3 (ref 0.9–4.8)
LYMPHOCYTES NFR BLD AUTO: 16.4 % (ref 19.6–45.3)
MCH RBC QN AUTO: 30.9 PG (ref 26.9–32)
MCHC RBC AUTO-ENTMCNC: 34 G/DL (ref 32.4–36.3)
MCV RBC AUTO: 90.9 FL (ref 80.5–98.2)
MONOCYTES # BLD AUTO: 0.53 10*3/MM3 (ref 0.2–1.2)
MONOCYTES NFR BLD AUTO: 4.9 % (ref 5–12)
NEUTROPHILS # BLD AUTO: 8.56 10*3/MM3 (ref 1.9–8.1)
NEUTROPHILS NFR BLD AUTO: 78.4 % (ref 42.7–76)
NITRITE UR QL STRIP: NEGATIVE
NRBC BLD MANUAL-RTO: 0 /100 WBC (ref 0–0)
PH UR STRIP.AUTO: 6.5 [PH] (ref 5–8)
PLATELET # BLD AUTO: 329 10*3/MM3 (ref 140–500)
PMV BLD AUTO: 9.6 FL (ref 6–12)
POTASSIUM BLD-SCNC: 4 MMOL/L (ref 3.5–5.2)
PROT SERPL-MCNC: 8 G/DL (ref 6–8.5)
PROT UR QL STRIP: NEGATIVE
RBC # BLD AUTO: 4.72 10*6/MM3 (ref 3.9–5.2)
SODIUM BLD-SCNC: 139 MMOL/L (ref 136–145)
SP GR UR STRIP: 1.03 (ref 1–1.03)
UROBILINOGEN UR QL STRIP: ABNORMAL
WBC NRBC COR # BLD: 10.91 10*3/MM3 (ref 4.5–10.7)
WHOLE BLOOD HOLD SPECIMEN: NORMAL
WHOLE BLOOD HOLD SPECIMEN: NORMAL

## 2018-11-02 PROCEDURE — 85025 COMPLETE CBC W/AUTO DIFF WBC: CPT | Performed by: EMERGENCY MEDICINE

## 2018-11-02 PROCEDURE — 25010000002 PROMETHAZINE PER 50 MG: Performed by: EMERGENCY MEDICINE

## 2018-11-02 PROCEDURE — 25010000002 METOCLOPRAMIDE PER 10 MG: Performed by: EMERGENCY MEDICINE

## 2018-11-02 PROCEDURE — 83690 ASSAY OF LIPASE: CPT | Performed by: EMERGENCY MEDICINE

## 2018-11-02 PROCEDURE — 81003 URINALYSIS AUTO W/O SCOPE: CPT | Performed by: EMERGENCY MEDICINE

## 2018-11-02 PROCEDURE — 84703 CHORIONIC GONADOTROPIN ASSAY: CPT | Performed by: EMERGENCY MEDICINE

## 2018-11-02 PROCEDURE — 80053 COMPREHEN METABOLIC PANEL: CPT | Performed by: EMERGENCY MEDICINE

## 2018-11-02 PROCEDURE — 99283 EMERGENCY DEPT VISIT LOW MDM: CPT

## 2018-11-02 PROCEDURE — 25010000002 ONDANSETRON PER 1 MG: Performed by: EMERGENCY MEDICINE

## 2018-11-02 PROCEDURE — 96375 TX/PRO/DX INJ NEW DRUG ADDON: CPT

## 2018-11-02 PROCEDURE — 96374 THER/PROPH/DIAG INJ IV PUSH: CPT

## 2018-11-02 RX ORDER — METOCLOPRAMIDE HYDROCHLORIDE 5 MG/ML
10 INJECTION INTRAMUSCULAR; INTRAVENOUS ONCE
Status: COMPLETED | OUTPATIENT
Start: 2018-11-02 | End: 2018-11-02

## 2018-11-02 RX ORDER — ONDANSETRON 2 MG/ML
8 INJECTION INTRAMUSCULAR; INTRAVENOUS ONCE
Status: COMPLETED | OUTPATIENT
Start: 2018-11-02 | End: 2018-11-02

## 2018-11-02 RX ORDER — PROMETHAZINE HYDROCHLORIDE 25 MG/ML
12.5 INJECTION, SOLUTION INTRAMUSCULAR; INTRAVENOUS ONCE
Status: COMPLETED | OUTPATIENT
Start: 2018-11-02 | End: 2018-11-02

## 2018-11-02 RX ORDER — METOCLOPRAMIDE 5 MG/1
5 TABLET ORAL 3 TIMES DAILY PRN
Qty: 20 TABLET | Refills: 0 | Status: SHIPPED | OUTPATIENT
Start: 2018-11-02 | End: 2018-11-05 | Stop reason: SDUPTHER

## 2018-11-02 RX ADMIN — METOCLOPRAMIDE 10 MG: 5 INJECTION, SOLUTION INTRAMUSCULAR; INTRAVENOUS at 10:28

## 2018-11-02 RX ADMIN — SODIUM CHLORIDE 1000 ML: 9 INJECTION, SOLUTION INTRAVENOUS at 08:36

## 2018-11-02 RX ADMIN — ONDANSETRON 8 MG: 2 INJECTION INTRAMUSCULAR; INTRAVENOUS at 08:36

## 2018-11-02 RX ADMIN — PROMETHAZINE HYDROCHLORIDE 12.5 MG: 25 INJECTION INTRAMUSCULAR; INTRAVENOUS at 09:37

## 2018-11-02 NOTE — ED PROVIDER NOTES
EMERGENCY DEPARTMENT ENCOUNTER    CHIEF COMPLAINT  Chief Complaint: N/V  History given by: patient  History limited by: nothing  Room Number: 32/32  PMD: Lyly Back PA-C      HPI:  Pt is a 43 y.o. female who presents complaining of N/V with bilious emesis for the last 2.5 days. Pt denies fever, diarrhea, recent travel, dysuria, urinary frequency or recent sick contact. Pt states that she finished a course of amoxicillin two weeks ago for a dental infection. Pt states that she has been taking phenergan suppositories without relief.    Duration:  2.5 days  Onset: gradual  Timing: intermittent  Quality: bilious emesis  Intensity/Severity: moderate  Progression: unchanged  Associated Symptoms: none  Aggravating Factors: none  Alleviating Factors: none  Previous Episodes: none mentioned  Treatment before arrival: Pt states that she has been taking phenergan suppositories without relief.      PAST MEDICAL HISTORY  Active Ambulatory Problems     Diagnosis Date Noted   • Vitamin B12 deficiency 11/21/2015   • Vitamin D deficiency disease 11/21/2015   • Acquired hypothyroidism 11/21/2015   • Arnold-Chiari malformation, type I (CMS/HCC) 11/21/2015   • Neck pain 11/21/2015   • Peripheral neuropathy 11/21/2015   • Headache 11/21/2015   • Anxiety 11/21/2015   • Hyperlipidemia 11/21/2015   • Arthritis 11/21/2015     Resolved Ambulatory Problems     Diagnosis Date Noted   • No Resolved Ambulatory Problems     Past Medical History:   Diagnosis Date   • Anxiety    • Arnold-Chiari malformation, type I (CMS/HCC)    • Arthritis    • Chiari I malformation (CMS/HCC)    • Disease of thyroid gland    • Headache    • Hyperlipidemia    • Insomnia    • Neck pain    • Peripheral neuropathy    • Vitamin B12 deficiency    • Vitamin D deficiency disease        PAST SURGICAL HISTORY  Past Surgical History:   Procedure Laterality Date   • BRAIN SURGERY  2207 12-07 DR Peraza   • BREAST SURGERY  10/30/2013    left abscess I & D   • KNEE  SURGERY         FAMILY HISTORY  Family History   Problem Relation Age of Onset   • COPD Mother    • Emphysema Mother    • Pulmonary embolism Father    • Alcohol abuse Other    • Diabetes Other    • Cancer Other    • Kidney disease Other        SOCIAL HISTORY  Social History     Social History   • Marital status:      Spouse name: N/A   • Number of children: 3   • Years of education: HIGHSCHOOL     Occupational History   • UNEMPLOYED      Social History Main Topics   • Smoking status: Current Every Day Smoker     Packs/day: 0.50     Last attempt to quit: 1/23/2016   • Smokeless tobacco: Never Used   • Alcohol use Yes   • Drug use: No   • Sexual activity: Yes     Other Topics Concern   • Not on file     Social History Narrative   • No narrative on file       ALLERGIES  Patient has no known allergies.    REVIEW OF SYSTEMS  Review of Systems   Constitutional: Negative for fever.   HENT: Negative for sore throat.    Eyes: Negative.    Respiratory: Negative for cough and shortness of breath.    Cardiovascular: Negative for chest pain.   Gastrointestinal: Positive for nausea and vomiting. Negative for abdominal pain and diarrhea.   Genitourinary: Negative for dysuria.   Musculoskeletal: Negative for neck pain.   Skin: Negative for rash.   Allergic/Immunologic: Negative.    Neurological: Negative for weakness, numbness and headaches.   Hematological: Negative.    Psychiatric/Behavioral: Negative.    All other systems reviewed and are negative.      PHYSICAL EXAM  ED Triage Vitals   Temp Heart Rate Resp BP SpO2   11/02/18 0738 11/02/18 0738 11/02/18 0738 11/02/18 0806 11/02/18 0738   98.8 °F (37.1 °C) 101 16 111/81 97 %      Temp src Heart Rate Source Patient Position BP Location FiO2 (%)   -- -- 11/02/18 0806 -- --     Sitting         Physical Exam   Constitutional: She is oriented to person, place, and time. No distress.   Pt appears uncomfortable   HENT:   Head: Normocephalic and atraumatic.   Mouth/Throat:  Oropharynx is clear and moist.   Eyes: Pupils are equal, round, and reactive to light. EOM are normal.   Neck: Normal range of motion. Neck supple.   Cardiovascular: Normal rate, regular rhythm and normal heart sounds.    Pulmonary/Chest: Effort normal and breath sounds normal. No respiratory distress.   Abdominal: Soft. There is no tenderness. There is no rebound and no guarding.   Musculoskeletal: Normal range of motion. She exhibits no edema.   Neurological: She is alert and oriented to person, place, and time. She has normal sensation and normal strength.   Skin: Skin is warm and dry. No rash noted.   Psychiatric: Mood and affect normal.   Nursing note and vitals reviewed.      LAB RESULTS  Lab Results (last 24 hours)     Procedure Component Value Units Date/Time    CBC & Differential [[id number]] Collected:  11/02/18 0803    Specimen:  Blood Updated:  11/02/18 0832    Narrative:       The following orders were created for panel order CBC & Differential.  Procedure                               Abnormality         Status                     ---------                               -----------         ------                     CBC Auto Differential[[id number]]        Abnormal            Final result                 Please view results for these tests on the individual orders.    Comprehensive Metabolic Panel [[id number]]  (Abnormal) Collected:  11/02/18 0803    Specimen:  Blood Updated:  11/02/18 0905     Glucose 137 (H) mg/dL      BUN 12 mg/dL      Creatinine 0.85 mg/dL      Sodium 139 mmol/L      Potassium 4.0 mmol/L      Chloride 101 mmol/L      CO2 26.8 mmol/L      Calcium 9.6 mg/dL      Total Protein 8.0 g/dL      Albumin 4.70 g/dL      ALT (SGPT) 12 U/L      AST (SGOT) 9 U/L      Alkaline Phosphatase 68 U/L      Total Bilirubin 0.5 mg/dL      eGFR Non African Amer 74 mL/min/1.73      Globulin 3.3 gm/dL      A/G Ratio 1.4 g/dL      BUN/Creatinine Ratio 14.1     Anion Gap 11.2 mmol/L     Lipase [[id number]]   (Normal) Collected:  11/02/18 0803    Specimen:  Blood Updated:  11/02/18 0905     Lipase 24 U/L     hCG, Serum, Qualitative [[id number]]  (Normal) Collected:  11/02/18 0803    Specimen:  Blood Updated:  11/02/18 0848     HCG Qualitative Negative    CBC Auto Differential [[id number]]  (Abnormal) Collected:  11/02/18 0803    Specimen:  Blood Updated:  11/02/18 0832     WBC 10.91 (H) 10*3/mm3      RBC 4.72 10*6/mm3      Hemoglobin 14.6 g/dL      Hematocrit 42.9 %      MCV 90.9 fL      MCH 30.9 pg      MCHC 34.0 g/dL      RDW 12.9 %      RDW-SD 42.8 fl      MPV 9.6 fL      Platelets 329 10*3/mm3      Neutrophil % 78.4 (H) %      Lymphocyte % 16.4 (L) %      Monocyte % 4.9 (L) %      Eosinophil % 0.1 (L) %      Basophil % 0.2 %      Immature Grans % 0.1 %      Neutrophils, Absolute 8.56 (H) 10*3/mm3      Lymphocytes, Absolute 1.79 10*3/mm3      Monocytes, Absolute 0.53 10*3/mm3      Eosinophils, Absolute 0.01 10*3/mm3      Basophils, Absolute 0.02 10*3/mm3      Immature Grans, Absolute 0.01 10*3/mm3      nRBC 0.0 /100 WBC     Urinalysis With Microscopic If Indicated (No Culture) - Urine, Clean Catch [[id number]]  (Abnormal) Collected:  11/02/18 0836    Specimen:  Urine from Urine, Clean Catch Updated:  11/02/18 0846     Color, UA Yellow     Appearance, UA Clear     pH, UA 6.5     Specific Gravity, UA 1.028     Glucose, UA Negative     Ketones, UA Trace (A)     Bilirubin, UA Negative     Blood, UA Negative     Protein, UA Negative     Leuk Esterase, UA Negative     Nitrite, UA Negative     Urobilinogen, UA 1.0 E.U./dL    Narrative:       Urine microscopic not indicated.          I ordered the above labs and reviewed the results    PROCEDURES  Procedures      PROGRESS AND CONSULTS     0821- Discussed the plan to order lab work for further evaluation and nausea medication. Pt understands and agrees with the plan, all questions answered.    0823- Ordered blood work, UA, lipase and hCG for further evaluation. Ordered zofran for  nausea and IVF for hydration.    0928- Rechecked pt. Pt states that her nausea is unchanged after zofran and states that her nausea became worse after the RN palpated her abdomen. Notified pt and family of the pt's unremarkable lab results. D/w pt and family that the pt's symptoms are c/w a viral infection. Discussed the plan to order additional nausea medication. Pt understands and agrees with the plan, all questions answered.    0930- Ordered phenergan for nausea.    1011- Per RN, the pt is requesting additional nausea medication. Ordered reglan for nausea.    1115- Rechecked pt. Pt is resting comfortably and states that her nausea has improved after Reglan in the ED. Discussed the plan to discharge the pt home with prescriptions for Reglan. Pt understands and agrees with the plan, all questions answered.      MEDICAL DECISION MAKING  Results were reviewed/discussed with the patient and they were also made aware of online access. Pt also made aware that some labs, such as cultures, will not be resulted during ER visit and follow up with PMD is necessary.     MDM  Number of Diagnoses or Management Options  Non-intractable vomiting with nausea, unspecified vomiting type:      Amount and/or Complexity of Data Reviewed  Clinical lab tests: ordered and reviewed (WBC=10.91)  Decide to obtain previous medical records or to obtain history from someone other than the patient: yes  Review and summarize past medical records: yes (Pt saw her PCP yesterday complaining of N/V. Pt's PCP noted that she believed that the pt's symptoms were due to a virus and prescribed phenergan.)    Patient Progress  Patient progress: improved         DIAGNOSIS  Final diagnoses:   Non-intractable vomiting with nausea, unspecified vomiting type       DISPOSITION  DISCHARGE    Patient discharged in stable condition.    Reviewed implications of results, diagnosis, meds, responsibility to follow up, warning signs and symptoms of possible worsening,  potential complications and reasons to return to ER.    Patient/Family voiced understanding of above instructions.    Discussed plan for discharge, as there is no emergent indication for admission. Patient referred to primary care provider for BP management due to today's BP. Pt/family is agreeable and understands need for follow up and repeat testing.  Pt is aware that discharge does not mean that nothing is wrong but it indicates no emergency is present that requires admission and they must continue care with follow-up as given below or physician of their choice.     FOLLOW-UP  Lyly Back, MARIO  [address] [facility].[address] [place] [address]  [phone]    Call   As needed, If symptoms worsen         Medication List      New Prescriptions    metoclopramide 5 MG tablet  Commonly known as:  REGLAN  Take 1 tablet by mouth 3 (Three) Times a Day As Needed (nausea/vomiting).              Latest Documented Vital Signs:  As of 11:19 AM  BP- 111/81 HR- 101 Temp- 98.8 °F (37.1 °C) O2 sat- 97%    --  Documentation assistance provided by ranjana Hall for Dr. Lombardo.  Information recorded by the ranjana was done at my direction and has been verified and validated by me.     Lida Hall  11/02/18 0997       Derik Lombardo MD  11/03/18 3712

## 2018-11-02 NOTE — ED TRIAGE NOTES
Pt seen by PCP yesterday. Given Phenergan IM and RX for phenergan suppository's.  Pt states she has not stopped vomiting despite phenergan.

## 2018-11-05 ENCOUNTER — APPOINTMENT (OUTPATIENT)
Dept: CT IMAGING | Facility: HOSPITAL | Age: 43
End: 2018-11-05

## 2018-11-05 ENCOUNTER — HOSPITAL ENCOUNTER (EMERGENCY)
Facility: HOSPITAL | Age: 43
Discharge: HOME | End: 2018-11-05
Attending: EMERGENCY MEDICINE | Admitting: EMERGENCY MEDICINE

## 2018-11-05 VITALS
HEIGHT: 67 IN | WEIGHT: 175 LBS | HEART RATE: 92 BPM | TEMPERATURE: 99.6 F | RESPIRATION RATE: 18 BRPM | DIASTOLIC BLOOD PRESSURE: 78 MMHG | SYSTOLIC BLOOD PRESSURE: 114 MMHG | OXYGEN SATURATION: 98 % | BODY MASS INDEX: 27.47 KG/M2

## 2018-11-05 DIAGNOSIS — D72.829 LEUKOCYTOSIS, UNSPECIFIED TYPE: ICD-10-CM

## 2018-11-05 DIAGNOSIS — R11.2 NON-INTRACTABLE VOMITING WITH NAUSEA, UNSPECIFIED VOMITING TYPE: Primary | ICD-10-CM

## 2018-11-05 LAB
ALBUMIN SERPL-MCNC: 4.3 G/DL (ref 3.5–5.2)
ALBUMIN/GLOB SERPL: 1.5 G/DL
ALP SERPL-CCNC: 62 U/L (ref 39–117)
ALT SERPL W P-5'-P-CCNC: 12 U/L (ref 1–33)
ANION GAP SERPL CALCULATED.3IONS-SCNC: 9.5 MMOL/L
AST SERPL-CCNC: 12 U/L (ref 1–32)
BASOPHILS # BLD AUTO: 0.04 10*3/MM3 (ref 0–0.2)
BASOPHILS NFR BLD AUTO: 0.4 % (ref 0–1.5)
BILIRUB SERPL-MCNC: 0.5 MG/DL (ref 0.1–1.2)
BUN BLD-MCNC: 12 MG/DL (ref 6–20)
BUN/CREAT SERPL: 15 (ref 7–25)
CALCIUM SPEC-SCNC: 9.2 MG/DL (ref 8.6–10.5)
CHLORIDE SERPL-SCNC: 100 MMOL/L (ref 98–107)
CO2 SERPL-SCNC: 27.5 MMOL/L (ref 22–29)
CREAT BLD-MCNC: 0.8 MG/DL (ref 0.57–1)
DEPRECATED RDW RBC AUTO: 41.9 FL (ref 37–54)
EOSINOPHIL # BLD AUTO: 0.09 10*3/MM3 (ref 0–0.7)
EOSINOPHIL NFR BLD AUTO: 0.8 % (ref 0.3–6.2)
ERYTHROCYTE [DISTWIDTH] IN BLOOD BY AUTOMATED COUNT: 12.7 % (ref 11.7–13)
GFR SERPL CREATININE-BSD FRML MDRD: 79 ML/MIN/1.73
GLOBULIN UR ELPH-MCNC: 2.8 GM/DL
GLUCOSE BLD-MCNC: 114 MG/DL (ref 65–99)
HCT VFR BLD AUTO: 40 % (ref 35.6–45.5)
HGB BLD-MCNC: 13.7 G/DL (ref 11.9–15.5)
IMM GRANULOCYTES # BLD: 0.02 10*3/MM3 (ref 0–0.03)
IMM GRANULOCYTES NFR BLD: 0.2 % (ref 0–0.5)
LIPASE SERPL-CCNC: 22 U/L (ref 13–60)
LYMPHOCYTES # BLD AUTO: 1.88 10*3/MM3 (ref 0.9–4.8)
LYMPHOCYTES NFR BLD AUTO: 16.7 % (ref 19.6–45.3)
MCH RBC QN AUTO: 30.8 PG (ref 26.9–32)
MCHC RBC AUTO-ENTMCNC: 34.3 G/DL (ref 32.4–36.3)
MCV RBC AUTO: 89.9 FL (ref 80.5–98.2)
MONOCYTES # BLD AUTO: 0.64 10*3/MM3 (ref 0.2–1.2)
MONOCYTES NFR BLD AUTO: 5.7 % (ref 5–12)
NEUTROPHILS # BLD AUTO: 8.58 10*3/MM3 (ref 1.9–8.1)
NEUTROPHILS NFR BLD AUTO: 76.4 % (ref 42.7–76)
PLATELET # BLD AUTO: 306 10*3/MM3 (ref 140–500)
PMV BLD AUTO: 9.3 FL (ref 6–12)
POTASSIUM BLD-SCNC: 3.9 MMOL/L (ref 3.5–5.2)
PROT SERPL-MCNC: 7.1 G/DL (ref 6–8.5)
RBC # BLD AUTO: 4.45 10*6/MM3 (ref 3.9–5.2)
SODIUM BLD-SCNC: 137 MMOL/L (ref 136–145)
WBC NRBC COR # BLD: 11.23 10*3/MM3 (ref 4.5–10.7)

## 2018-11-05 PROCEDURE — 85025 COMPLETE CBC W/AUTO DIFF WBC: CPT | Performed by: NURSE PRACTITIONER

## 2018-11-05 PROCEDURE — 96374 THER/PROPH/DIAG INJ IV PUSH: CPT

## 2018-11-05 PROCEDURE — 96361 HYDRATE IV INFUSION ADD-ON: CPT

## 2018-11-05 PROCEDURE — 80053 COMPREHEN METABOLIC PANEL: CPT | Performed by: NURSE PRACTITIONER

## 2018-11-05 PROCEDURE — 25010000002 PROCHLORPERAZINE EDISYLATE PER 10 MG: Performed by: EMERGENCY MEDICINE

## 2018-11-05 PROCEDURE — 25010000002 IOPAMIDOL 61 % SOLUTION: Performed by: EMERGENCY MEDICINE

## 2018-11-05 PROCEDURE — 74177 CT ABD & PELVIS W/CONTRAST: CPT

## 2018-11-05 PROCEDURE — 25010000002 METOCLOPRAMIDE PER 10 MG: Performed by: NURSE PRACTITIONER

## 2018-11-05 PROCEDURE — 83690 ASSAY OF LIPASE: CPT | Performed by: NURSE PRACTITIONER

## 2018-11-05 PROCEDURE — 96375 TX/PRO/DX INJ NEW DRUG ADDON: CPT

## 2018-11-05 PROCEDURE — 25010000002 ONDANSETRON PER 1 MG: Performed by: NURSE PRACTITIONER

## 2018-11-05 PROCEDURE — 99283 EMERGENCY DEPT VISIT LOW MDM: CPT

## 2018-11-05 RX ORDER — METOCLOPRAMIDE HYDROCHLORIDE 5 MG/ML
10 INJECTION INTRAMUSCULAR; INTRAVENOUS ONCE
Status: COMPLETED | OUTPATIENT
Start: 2018-11-05 | End: 2018-11-05

## 2018-11-05 RX ORDER — PROCHLORPERAZINE MALEATE 10 MG
10 TABLET ORAL EVERY 6 HOURS PRN
Qty: 20 TABLET | Refills: 0 | Status: SHIPPED | OUTPATIENT
Start: 2018-11-05 | End: 2019-06-04

## 2018-11-05 RX ORDER — METOCLOPRAMIDE 5 MG/1
TABLET ORAL
Qty: 30 TABLET | Refills: 1 | Status: SHIPPED | OUTPATIENT
Start: 2018-11-05 | End: 2018-11-05

## 2018-11-05 RX ORDER — ONDANSETRON 2 MG/ML
4 INJECTION INTRAMUSCULAR; INTRAVENOUS ONCE
Status: COMPLETED | OUTPATIENT
Start: 2018-11-05 | End: 2018-11-05

## 2018-11-05 RX ADMIN — METOCLOPRAMIDE 10 MG: 5 INJECTION, SOLUTION INTRAMUSCULAR; INTRAVENOUS at 10:29

## 2018-11-05 RX ADMIN — SODIUM CHLORIDE 1000 ML: 9 INJECTION, SOLUTION INTRAVENOUS at 11:42

## 2018-11-05 RX ADMIN — ONDANSETRON 4 MG: 2 INJECTION INTRAMUSCULAR; INTRAVENOUS at 11:43

## 2018-11-05 RX ADMIN — PROCHLORPERAZINE EDISYLATE 10 MG: 5 INJECTION INTRAMUSCULAR; INTRAVENOUS at 12:21

## 2018-11-05 RX ADMIN — IOPAMIDOL 100 ML: 612 INJECTION, SOLUTION INTRAVENOUS at 13:45

## 2018-11-05 NOTE — ED PROVIDER NOTES
EMERGENCY DEPARTMENT ENCOUNTER    CHIEF COMPLAINT  Chief Complaint: N/V  History given by: Patient  History limited by: none  Room Number: 44/44  PMD: Lyly Back PA-C      HPI:  Pt is a 43 y.o. female who presents complaining of N/V that began 6 days ago. Pt was seen here 3 days ago for same Sx and was discharged with Reglan with only mild relief. Pt states eating makes vomiting worse. Pt reports chronic HA. Pt denies fever, CP, SOA, diarrhea, abd pain, blood in stool, dysuria. LMP Oct 30th. Pt denies Hx of abd surgeries.     Duration/Onset/Timin days, gradual, constant  Location: NA  Radiation: NA  Quality: N/V  Intensity/Severity: moderate  Associated Symptoms: chronic HA  Aggravating or Alleviating Factors: none  Previous Episodes: none      PAST MEDICAL HISTORY  Active Ambulatory Problems     Diagnosis Date Noted   • Vitamin B12 deficiency 2015   • Vitamin D deficiency disease 2015   • Acquired hypothyroidism 2015   • Arnold-Chiari malformation, type I (CMS/HCC) 2015   • Neck pain 2015   • Peripheral neuropathy 2015   • Headache 2015   • Anxiety 2015   • Hyperlipidemia 2015   • Arthritis 2015     Resolved Ambulatory Problems     Diagnosis Date Noted   • No Resolved Ambulatory Problems     Past Medical History:   Diagnosis Date   • Anxiety    • Arnold-Chiari malformation, type I (CMS/HCC)    • Arthritis    • Chiari I malformation (CMS/HCC)    • Disease of thyroid gland    • Headache    • Hyperlipidemia    • Insomnia    • Neck pain    • Peripheral neuropathy    • Vitamin B12 deficiency    • Vitamin D deficiency disease        PAST SURGICAL HISTORY  Past Surgical History:   Procedure Laterality Date   • BRAIN SURGERY   DR Peraza   • BREAST SURGERY  10/30/2013    left abscess I & D   • KNEE SURGERY         FAMILY HISTORY  Family History   Problem Relation Age of Onset   • COPD Mother    • Emphysema Mother    • Pulmonary embolism  Father    • Alcohol abuse Other    • Diabetes Other    • Cancer Other    • Kidney disease Other        SOCIAL HISTORY  Social History     Social History   • Marital status:      Spouse name: N/A   • Number of children: 3   • Years of education: HIGHSCHOOL     Occupational History   • UNEMPLOYED      Social History Main Topics   • Smoking status: Current Every Day Smoker     Packs/day: 0.50     Last attempt to quit: 1/23/2016   • Smokeless tobacco: Never Used   • Alcohol use No   • Drug use: No   • Sexual activity: Defer     Other Topics Concern   • Not on file     Social History Narrative   • No narrative on file       ALLERGIES  Patient has no known allergies.    REVIEW OF SYSTEMS  Review of Systems   Constitutional: Negative.  Negative for fever.   HENT: Negative for sore throat.    Eyes: Negative.    Respiratory: Negative.  Negative for cough.    Cardiovascular: Negative.  Negative for chest pain.   Gastrointestinal: Positive for nausea and vomiting. Negative for abdominal pain, blood in stool and diarrhea.   Genitourinary: Negative.  Negative for dysuria.   Musculoskeletal: Negative.  Negative for back pain.   Skin: Negative.  Negative for rash.   Neurological: Positive for headaches (chronic).   All other systems reviewed and are negative.      PHYSICAL EXAM  ED Triage Vitals   Temp Heart Rate Resp BP SpO2   11/05/18 0932 11/05/18 0932 11/05/18 0932 11/05/18 0939 11/05/18 0932   99.6 °F (37.6 °C) 92 18 114/78 98 %      Temp src Heart Rate Source Patient Position BP Location FiO2 (%)   11/05/18 0932 -- 11/05/18 0939 11/05/18 0939 --   Tympanic  Sitting Right arm        Physical Exam   Constitutional: No distress.   HENT:   Head: Normocephalic and atraumatic.   Mouth/Throat: Oropharynx is clear and moist.   Eyes: Conjunctivae are normal.   Cardiovascular: Normal rate and regular rhythm.    Pulmonary/Chest: Breath sounds normal. No respiratory distress.   Abdominal: There is no tenderness.    Musculoskeletal: She exhibits no edema or tenderness.   Neurological: She is alert.   Skin: No rash noted.   Nursing note and vitals reviewed.      LAB RESULTS  Lab Results (last 24 hours)     Procedure Component Value Units Date/Time    CBC & Differential [[id number]] Collected:  11/05/18 1006    Specimen:  Blood Updated:  11/05/18 1023    Narrative:       The following orders were created for panel order CBC & Differential.  Procedure                               Abnormality         Status                     ---------                               -----------         ------                     CBC Auto Differential[[id number]]        Abnormal            Final result                 Please view results for these tests on the individual orders.    Comprehensive Metabolic Panel [[id number]]  (Abnormal) Collected:  11/05/18 1006    Specimen:  Blood Updated:  11/05/18 1047     Glucose 114 (H) mg/dL      BUN 12 mg/dL      Creatinine 0.80 mg/dL      Sodium 137 mmol/L      Potassium 3.9 mmol/L      Chloride 100 mmol/L      CO2 27.5 mmol/L      Calcium 9.2 mg/dL      Total Protein 7.1 g/dL      Albumin 4.30 g/dL      ALT (SGPT) 12 U/L      AST (SGOT) 12 U/L      Alkaline Phosphatase 62 U/L      Total Bilirubin 0.5 mg/dL      eGFR Non African Amer 79 mL/min/1.73      Globulin 2.8 gm/dL      A/G Ratio 1.5 g/dL      BUN/Creatinine Ratio 15.0     Anion Gap 9.5 mmol/L     Lipase [[id number]]  (Normal) Collected:  11/05/18 1006    Specimen:  Blood Updated:  11/05/18 1047     Lipase 22 U/L     CBC Auto Differential [[id number]]  (Abnormal) Collected:  11/05/18 1006    Specimen:  Blood Updated:  11/05/18 1023     WBC 11.23 (H) 10*3/mm3      RBC 4.45 10*6/mm3      Hemoglobin 13.7 g/dL      Hematocrit 40.0 %      MCV 89.9 fL      MCH 30.8 pg      MCHC 34.3 g/dL      RDW 12.7 %      RDW-SD 41.9 fl      MPV 9.3 fL      Platelets 306 10*3/mm3      Neutrophil % 76.4 (H) %      Lymphocyte % 16.7 (L) %      Monocyte % 5.7 %      Eosinophil  % 0.8 %      Basophil % 0.4 %      Immature Grans % 0.2 %      Neutrophils, Absolute 8.58 (H) 10*3/mm3      Lymphocytes, Absolute 1.88 10*3/mm3      Monocytes, Absolute 0.64 10*3/mm3      Eosinophils, Absolute 0.09 10*3/mm3      Basophils, Absolute 0.04 10*3/mm3      Immature Grans, Absolute 0.02 10*3/mm3           I ordered the above labs and reviewed the results    RADIOLOGY  CT Abdomen Pelvis With Contrast   Preliminary Result   1. Mildly thickened appearance of the gastric antrum, but the stomach is   collapsed. Please correlate clinically for gastritis.   2. Hepatic steatosis.   3. Tiny hiatal hernia.       Discussed with Dr. Irvin.               I ordered the above noted radiological studies. Interpreted by radiologist. Reviewed by me in PACS.       PROCEDURES  Procedures      PROGRESS AND CONSULTS  ED Course as of Nov 05 1427   Mon Nov 05, 2018   0948 N/v  Denies abd pain  Requesting reglan iv  Resent ER visit  [KG]      ED Course User Index  [KG] Karissa Patel, APRN     1216  Initial encounter. Pt reports no relief with IV Reglan or Zofran by MILTON.  Notified pt of lab work, including elevated WBC. Discussed the plan to further evaluate with CT Head and empirically treat with IV Compazine. Pt understands and agrees with the plan, all questions answered.    1420  Rechecked pt. Pt is resting comfortably and feels better after IVF's. Pt states she is comfortable going home. Notified pt of unremarkable CT Head results. Discussed the plan to discharge the pt home with Rx for Compazine. Pt understands and agrees with the plan, all questions answered.      MEDICAL DECISION MAKING  Results were reviewed/discussed with the patient and they were also made aware of online access. Pt also made aware that some labs, such as cultures, will not be resulted during ER visit and follow up with PMD is necessary.     MDM  Number of Diagnoses or Management Options     Amount and/or Complexity of Data Reviewed  Clinical lab  tests: reviewed and ordered (WBC-11.23)  Tests in the radiology section of CPT®: ordered and reviewed (CT Head is negative. )  Decide to obtain previous medical records or to obtain history from someone other than the patient: yes  Review and summarize past medical records: yes (Pt was seen here on 11/2/18 for N/V.)           DIAGNOSIS  Final diagnoses:   Non-intractable vomiting with nausea, unspecified vomiting type   Leukocytosis, unspecified type       DISPOSITION  DISCHARGE    Patient discharged in stable condition.    Reviewed implications of results, diagnosis, meds, responsibility to follow up, warning signs and symptoms of possible worsening, potential complications and reasons to return to ER.    Patient/Family voiced understanding of above instructions.    Discussed plan for discharge, as there is no emergent indication for admission. Patient referred to primary care provider for BP management due to today's BP. Pt/family is agreeable and understands need for follow up and repeat testing.  Pt is aware that discharge does not mean that nothing is wrong but it indicates no emergency is present that requires admission and they must continue care with follow-up as given below or physician of their choice.     FOLLOW-UP  Lyly Back, PAMITALI  [address] [facility]  [facility] [address]  [phone]    In 3 days  If Not Better    Kit Galdamez MD  [address] [facility] [address]  [facility] [address]  [phone]    In 1 week  If Not Better         Medication List      New Prescriptions    prochlorperazine 10 MG tablet  Commonly known as:  COMPAZINE  Take 1 tablet by mouth Every 6 (Six) Hours As Needed for Nausea or   Vomiting.        Stop    HYDROcodone-acetaminophen 5-325 MG per tablet  Commonly known as:  NORCO     metoclopramide 5 MG tablet  Commonly known as:  REGLAN              Latest Documented Vital Signs:  As of 2:27 PM  BP- 114/78 HR- 92 Temp- 99.6 °F (37.6 °C) (Tympanic) O2 sat-  98%    --  Documentation assistance provided by ranjana Mix for Dr. Irvin.  Information recorded by the scribe was done at my direction and has been verified and validated by me.            Percy Mix  11/05/18 1429       Ramon Irvin MD  11/05/18 0984

## 2018-11-05 NOTE — ED NOTES
Patient reports nausea and vomiting X 5 days. Was seen in ED last week for same.     Srinivasan Gilman RN  11/05/18 0946

## 2018-11-21 ENCOUNTER — CLINICAL SUPPORT (OUTPATIENT)
Dept: FAMILY MEDICINE CLINIC | Facility: CLINIC | Age: 43
End: 2018-11-21

## 2018-11-21 DIAGNOSIS — E53.8 VITAMIN B12 DEFICIENCY: Primary | ICD-10-CM

## 2018-11-21 DIAGNOSIS — Z23 FLU VACCINE NEED: ICD-10-CM

## 2018-11-21 PROCEDURE — 90471 IMMUNIZATION ADMIN: CPT | Performed by: PHYSICIAN ASSISTANT

## 2018-11-21 PROCEDURE — 96372 THER/PROPH/DIAG INJ SC/IM: CPT | Performed by: PHYSICIAN ASSISTANT

## 2018-11-21 PROCEDURE — 90674 CCIIV4 VAC NO PRSV 0.5 ML IM: CPT | Performed by: PHYSICIAN ASSISTANT

## 2018-11-21 RX ADMIN — CYANOCOBALAMIN 1000 MCG: 1000 INJECTION, SOLUTION INTRAMUSCULAR; SUBCUTANEOUS at 14:57

## 2018-12-21 ENCOUNTER — CLINICAL SUPPORT (OUTPATIENT)
Dept: FAMILY MEDICINE CLINIC | Facility: CLINIC | Age: 43
End: 2018-12-21

## 2018-12-21 DIAGNOSIS — E53.8 VITAMIN B12 DEFICIENCY: Primary | ICD-10-CM

## 2018-12-21 PROCEDURE — 96372 THER/PROPH/DIAG INJ SC/IM: CPT | Performed by: PHYSICIAN ASSISTANT

## 2018-12-21 RX ADMIN — CYANOCOBALAMIN 1000 MCG: 1000 INJECTION, SOLUTION INTRAMUSCULAR; SUBCUTANEOUS at 14:39

## 2019-01-21 ENCOUNTER — TELEPHONE (OUTPATIENT)
Dept: NEUROSURGERY | Facility: CLINIC | Age: 44
End: 2019-01-21

## 2019-01-21 DIAGNOSIS — G44.89 OTHER HEADACHE SYNDROME: ICD-10-CM

## 2019-01-21 DIAGNOSIS — Z86.69 HISTORY OF CHIARI MALFORMATION: ICD-10-CM

## 2019-01-21 DIAGNOSIS — M54.12 CERVICAL RADICULITIS: ICD-10-CM

## 2019-01-21 DIAGNOSIS — M54.2 NECK PAIN: Primary | ICD-10-CM

## 2019-01-21 NOTE — TELEPHONE ENCOUNTER
I called patient about her following up with a new neck MRI and she agreed to schedule. Patient is scheduled to see Dr. Slade on 02/25/19. She is going to call scheduling to get her MRI scheduled.

## 2019-01-29 ENCOUNTER — CLINICAL SUPPORT (OUTPATIENT)
Dept: FAMILY MEDICINE CLINIC | Facility: CLINIC | Age: 44
End: 2019-01-29

## 2019-01-29 DIAGNOSIS — E53.8 VITAMIN B12 DEFICIENCY: Primary | ICD-10-CM

## 2019-01-29 PROCEDURE — 96372 THER/PROPH/DIAG INJ SC/IM: CPT | Performed by: PHYSICIAN ASSISTANT

## 2019-01-29 RX ADMIN — CYANOCOBALAMIN 1000 MCG: 1000 INJECTION, SOLUTION INTRAMUSCULAR; SUBCUTANEOUS at 13:01

## 2019-02-02 ENCOUNTER — HOSPITAL ENCOUNTER (OUTPATIENT)
Dept: MRI IMAGING | Facility: HOSPITAL | Age: 44
Discharge: HOME | End: 2019-02-02
Admitting: NURSE PRACTITIONER

## 2019-02-02 DIAGNOSIS — M54.2 NECK PAIN: ICD-10-CM

## 2019-02-02 DIAGNOSIS — M54.12 CERVICAL RADICULITIS: ICD-10-CM

## 2019-02-02 DIAGNOSIS — G44.89 OTHER HEADACHE SYNDROME: ICD-10-CM

## 2019-02-02 DIAGNOSIS — Z86.69 HISTORY OF CHIARI MALFORMATION: ICD-10-CM

## 2019-02-02 PROCEDURE — 72141 MRI NECK SPINE W/O DYE: CPT

## 2019-02-13 ENCOUNTER — OFFICE VISIT (OUTPATIENT)
Dept: FAMILY MEDICINE CLINIC | Facility: CLINIC | Age: 44
End: 2019-02-13

## 2019-02-13 VITALS
BODY MASS INDEX: 27.94 KG/M2 | RESPIRATION RATE: 16 BRPM | DIASTOLIC BLOOD PRESSURE: 72 MMHG | HEART RATE: 82 BPM | WEIGHT: 178 LBS | OXYGEN SATURATION: 100 % | SYSTOLIC BLOOD PRESSURE: 120 MMHG | TEMPERATURE: 98.3 F | HEIGHT: 67 IN

## 2019-02-13 DIAGNOSIS — E03.9 ACQUIRED HYPOTHYROIDISM: ICD-10-CM

## 2019-02-13 DIAGNOSIS — E55.9 VITAMIN D DEFICIENCY DISEASE: ICD-10-CM

## 2019-02-13 DIAGNOSIS — R51.9 CHRONIC DAILY HEADACHE: ICD-10-CM

## 2019-02-13 DIAGNOSIS — M54.50 LUMBAR BACK PAIN: ICD-10-CM

## 2019-02-13 DIAGNOSIS — M54.32 LEFT SCIATIC NERVE PAIN: ICD-10-CM

## 2019-02-13 DIAGNOSIS — E78.2 MIXED HYPERLIPIDEMIA: ICD-10-CM

## 2019-02-13 DIAGNOSIS — G93.5 ARNOLD-CHIARI MALFORMATION, TYPE I (HCC): ICD-10-CM

## 2019-02-13 DIAGNOSIS — G62.9 PERIPHERAL POLYNEUROPATHY: Primary | ICD-10-CM

## 2019-02-13 DIAGNOSIS — E53.8 VITAMIN B12 DEFICIENCY: ICD-10-CM

## 2019-02-13 PROCEDURE — 72110 X-RAY EXAM L-2 SPINE 4/>VWS: CPT | Performed by: PHYSICIAN ASSISTANT

## 2019-02-13 PROCEDURE — 99214 OFFICE O/P EST MOD 30 MIN: CPT | Performed by: PHYSICIAN ASSISTANT

## 2019-02-13 RX ORDER — AMOXICILLIN AND CLAVULANATE POTASSIUM 875; 125 MG/1; MG/1
1 TABLET, FILM COATED ORAL EVERY 12 HOURS SCHEDULED
Qty: 20 TABLET | Refills: 0 | Status: SHIPPED | OUTPATIENT
Start: 2019-02-13 | End: 2019-03-13

## 2019-02-13 RX ORDER — TRAMADOL HYDROCHLORIDE 50 MG/1
50 TABLET ORAL EVERY 6 HOURS PRN
Qty: 90 TABLET | Refills: 2 | Status: SHIPPED | OUTPATIENT
Start: 2019-02-13 | End: 2019-06-04 | Stop reason: SDUPTHER

## 2019-02-13 RX ORDER — FLUCONAZOLE 150 MG/1
150 TABLET ORAL ONCE
Qty: 1 TABLET | Refills: 2 | Status: SHIPPED | OUTPATIENT
Start: 2019-02-13 | End: 2019-02-13

## 2019-02-13 NOTE — PATIENT INSTRUCTIONS
Low glycemic index diet  Exercise 30 minutes most days of the week  Make sure you get results on any labs or tests we ordered today  We discussed medications and how to take them as prescribed  Sleep 6-8 hours each night if possible  If you have not signed up for [employer], please activate your code ASAP from your After Visit Summary today    LDL goal <100  LDL goal if heart disease <70  HDL goal >60  Triglyceride goal <150  BP goal =<130/80  Fasting glucose <100

## 2019-02-13 NOTE — PROGRESS NOTES
Subjective   Cindy Hawk is a 43 y.o. female.     History of Present Illness   Cindy Hawk 43 y.o. female who presents today for routine follow up check and medication refills.  she has a history of   Patient Active Problem List   Diagnosis   • Vitamin B12 deficiency   • Vitamin D deficiency disease   • Acquired hypothyroidism   • Arnold-Chiari malformation, type I (CMS/HCC)   • Neck pain   • Peripheral neuropathy   • Headache   • Anxiety   • Hyperlipidemia   • Arthritis   .  Since the last visit, she has overall felt tired.  She has Hypothyroidism and will need to update labs for continued treatment and will get labs.  she has been compliant with current medications have reviewed them.  The patient denies medication side effects.  I still need labs and will print order; I have her on B12 and thyroid    Results for orders placed or performed during the hospital encounter of 11/05/18   Comprehensive Metabolic Panel   Result Value Ref Range    Glucose 114 (H) 65 - 99 mg/dL    BUN 12 6 - 20 mg/dL    Creatinine 0.80 0.57 - 1.00 mg/dL    Sodium 137 136 - 145 mmol/L    Potassium 3.9 3.5 - 5.2 mmol/L    Chloride 100 98 - 107 mmol/L    CO2 27.5 22.0 - 29.0 mmol/L    Calcium 9.2 8.6 - 10.5 mg/dL    Total Protein 7.1 6.0 - 8.5 g/dL    Albumin 4.30 3.50 - 5.20 g/dL    ALT (SGPT) 12 1 - 33 U/L    AST (SGOT) 12 1 - 32 U/L    Alkaline Phosphatase 62 39 - 117 U/L    Total Bilirubin 0.5 0.1 - 1.2 mg/dL    eGFR Non African Amer 79 >60 mL/min/1.73    Globulin 2.8 gm/dL    A/G Ratio 1.5 g/dL    BUN/Creatinine Ratio 15.0 7.0 - 25.0    Anion Gap 9.5 mmol/L   Lipase   Result Value Ref Range    Lipase 22 13 - 60 U/L   CBC Auto Differential   Result Value Ref Range    WBC 11.23 (H) 4.50 - 10.70 10*3/mm3    RBC 4.45 3.90 - 5.20 10*6/mm3    Hemoglobin 13.7 11.9 - 15.5 g/dL    Hematocrit 40.0 35.6 - 45.5 %    MCV 89.9 80.5 - 98.2 fL    MCH 30.8 26.9 - 32.0 pg    MCHC 34.3 32.4 - 36.3 g/dL    RDW 12.7 11.7 - 13.0 %    RDW-SD  41.9 37.0 - 54.0 fl    MPV 9.3 6.0 - 12.0 fL    Platelets 306 140 - 500 10*3/mm3    Neutrophil % 76.4 (H) 42.7 - 76.0 %    Lymphocyte % 16.7 (L) 19.6 - 45.3 %    Monocyte % 5.7 5.0 - 12.0 %    Eosinophil % 0.8 0.3 - 6.2 %    Basophil % 0.4 0.0 - 1.5 %    Immature Grans % 0.2 0.0 - 0.5 %    Neutrophils, Absolute 8.58 (H) 1.90 - 8.10 10*3/mm3    Lymphocytes, Absolute 1.88 0.90 - 4.80 10*3/mm3    Monocytes, Absolute 0.64 0.20 - 1.20 10*3/mm3    Eosinophils, Absolute 0.09 0.00 - 0.70 10*3/mm3    Basophils, Absolute 0.04 0.00 - 0.20 10*3/mm3    Immature Grans, Absolute 0.02 0.00 - 0.03 10*3/mm3     Seeing Neurosurgeon for the neck and just had MRI; also N/T arms  Cindy Hawk 43 y.o. female presents for evaluation and treatment of  low back pain. The patient first noted symptoms on and off for years, worse in last month years ago. It was not related to none recalled by the patient.  The pain intensity is mild and moderate , and is located left side, central, lumbar, sacral, left leg and piriformis area. The pain is described as aching, burning and sharp and occurs constantly. The symptoms are progressive. Symptoms are exacerbated by sitting and light exertion. Factors which relieve the pain include NSAID's. Other associated symptoms include aching pain in the left leg, denies pain radiating down right leg, denies motor loss, denies sensory loss and denies weakness in legs. Previous history of symptoms: the problem is long-standing. Treatment efforts have included NSAID's , with and without relief.  X-Ray  Interpretation report in house X-rays that I personally viewed    Relevant Clinical Issues/Diagnoses/Indications:  Back pain        Clinical Findings:  Slight curve DDD changes more L4-S1; no compression fx          Comparative Data:  none          Date of Previous X-ray:    Change on current X-ray:      Still having chronic daily h/a and will reorder neuro consult  She is still having h/a's and blurry  vision  Starting with URI since Sunday  Needs refill Ultram for the chronic daily Chiari h/a's    She is on Ultram for the h/a pain and neck pain and The patient has read and signed the [facility] Controlled Substance Contract.  I will continue to see patient for regular follow up appointments.  They are well controlled on their medication.  LISA has been reviewed by me and is updated every 3 months. The patient is aware of the potential for addiction and dependence.     The following portions of the patient's history were reviewed and updated as appropriate: allergies, current medications, past family history, past medical history, past social history, past surgical history and problem list.    Review of Systems   Constitutional: Positive for fatigue. Negative for activity change, appetite change and unexpected weight change.   HENT: Positive for ear discharge, sinus pressure, sneezing, sore throat and tinnitus. Negative for nosebleeds and trouble swallowing.    Eyes: Negative for pain and visual disturbance.   Respiratory: Positive for cough and chest tightness. Negative for shortness of breath and wheezing.    Cardiovascular: Negative for chest pain and palpitations.   Gastrointestinal: Positive for diarrhea. Negative for abdominal pain and blood in stool.   Endocrine: Negative.    Genitourinary: Negative for difficulty urinating and hematuria.   Musculoskeletal: Positive for back pain and neck stiffness. Negative for joint swelling.   Skin: Negative for color change and rash.   Allergic/Immunologic: Negative.    Neurological: Positive for dizziness, weakness, light-headedness, numbness and headaches. Negative for syncope and speech difficulty.   Hematological: Negative for adenopathy.   Psychiatric/Behavioral: Negative for agitation and confusion.   All other systems reviewed and are negative.      Objective   Physical Exam   Constitutional: She is oriented to person, place, and time. She appears  well-developed and well-nourished. No distress.   HENT:   Head: Normocephalic and atraumatic.   Right Ear: External ear normal.   Left Ear: External ear normal.   Nose: Nose normal.   Mouth/Throat: Oropharynx is clear and moist. No oropharyngeal exudate.   Eyes: Conjunctivae and EOM are normal. Pupils are equal, round, and reactive to light. Right eye exhibits no discharge. Left eye exhibits no discharge. No scleral icterus.   Neck: Normal range of motion. Neck supple. No tracheal deviation present. No thyromegaly present.   Cardiovascular: Normal rate, regular rhythm, normal heart sounds, intact distal pulses and normal pulses. Exam reveals no gallop.   No murmur heard.  Pulmonary/Chest: Effort normal and breath sounds normal. No respiratory distress. She has no wheezes. She has no rales.   Musculoskeletal: Normal range of motion.   Lymphadenopathy:     She has cervical adenopathy.   Neurological: She is alert and oriented to person, place, and time. Coordination normal.   Skin: Skin is warm. No rash noted. No erythema. No pallor.   Psychiatric: She has a normal mood and affect. Her behavior is normal. Judgment and thought content normal.   Nursing note and vitals reviewed.      Assessment/Plan   Problems Addressed this Visit        Cardiovascular and Mediastinum    Hyperlipidemia       Digestive    Vitamin B12 deficiency    Vitamin D deficiency disease       Endocrine    Acquired hypothyroidism       Nervous and Auditory    Arnold-Chiari malformation, type I (CMS/HCC)    Peripheral neuropathy - Primary    Relevant Orders    Ambulatory Referral to Neurology      Other Visit Diagnoses     Chronic daily headache        Relevant Orders    Ambulatory Referral to Neurology    Lumbar back pain        Relevant Orders    XR Spine Lumbar 4+ View    Left sciatic nerve pain        Relevant Orders    XR Spine Lumbar 4+ View

## 2019-03-07 NOTE — PROGRESS NOTES
Subjective   Patient ID: Cindy Hawk is a 43 y.o. female is here today for follow-up with MRI of cervical spine.     Patient was last seen 1.16.18 for occipital headache and neck pain, fatigue and N/T bilateral hands and feet.    Patient is currently having N/T bilateral hands and feet, worse at night when trying to sleep.  She states that she has constant neck discomfort that is mild and not noticeable, however, when she coughs or sneezes she can hear her neck pop and crack.  She has intermittent right shoulder and collar bone pain.    She does have bilateral arm weakness.  She has daily occipital headaches.  She is taking Ultram prn as prescribed by PCP.  She has never seen a headache neurologist.    She is now also having severe low back pain with rare left lateral leg pain.  She is unsure of leg weakness.    Neck Pain    The problem occurs constantly. The problem has been unchanged. The pain is present in the midline. The pain is at a severity of 2/10. The pain is mild. Associated symptoms include headaches, leg pain, numbness, tingling and weakness.   Extremity Weakness    The pain is present in the left arm and right arm. The problem occurs constantly. Associated symptoms include numbness and tingling.   Headache    The problem occurs constantly. The pain is located in the occipital region. The pain does not radiate. The pain is at a severity of 5/10. The pain is moderate. Associated symptoms include back pain, neck pain, numbness, tingling and weakness.   Back Pain   The problem occurs constantly. The pain is present in the lumbar spine. The pain radiates to the left thigh. The pain is at a severity of 8/10. The pain is severe. Associated symptoms include headaches, leg pain, numbness, tingling and weakness.       The following portions of the patient's history were reviewed and updated as appropriate: allergies, current medications, past family history, past medical history, past social history, past  surgical history and problem list.    Review of Systems   Musculoskeletal: Positive for arthralgias, back pain, extremity weakness, neck pain and neck stiffness. Negative for gait problem.   Neurological: Positive for tingling, weakness, numbness and headaches.   All other systems reviewed and are negative.    This patient is with her .  She has a complicated neurological history.  She underwent the Chiari decompression by Dr. Wicho Peraza in 2007.  Apparently, her symptoms of neck ache, numbness and tingling in the arms and hands and suboccipital headaches was triggered by an epidural catheter that was placed a few months earlier for childbirth.  She underwent a decompression in 2007, did well for about 3 months and then her symptoms recurred.  She has seen Dr. Toussaint, who did not think that anything surgical was needed.  She was here last year for my opinion and an MRI was ordered, but she did not do it until recently.  I have reviewed the cervical MRI with her, which frankly showed that the tonsils are in fairly normal position, but that there is plenty of space with an increased level of CSF signal around the cervical medullary junction, so I do not think there is anymore obstruction to flow.  Unfortunately, there is really not much from a surgical standpoint to do.  She asked about some neck pain that she has and interscapular pain, which is probably related in part to some cervical disc disease.  She has also had a lot of chronic low back pain and left leg pain, which has never really been looked at before.  She is going to see the neurologist for her headaches, which I think is reasonable since there is not much from the surgical standpoint to do.  Perhaps, they can focus on treating it as a migraine since there are certain features of her description that sound migrainous.  I will order an MRI of the lumbar spine and have her come back to talk about options.  We can start with physical therapy.  Given  her history of the problems with epidural catheter, she was reluctant to do anything like epidural blocks.      Objective   Physical Exam   Constitutional: She is oriented to person, place, and time. She appears well-developed and well-nourished.   HENT:   Head: Normocephalic and atraumatic.   Eyes: Conjunctivae and EOM are normal. Pupils are equal, round, and reactive to light.   Fundoscopic exam:       The right eye shows no papilledema. The right eye shows venous pulsations.        The left eye shows no papilledema. The left eye shows venous pulsations.   Neck: Carotid bruit is not present.   Neurological: She is oriented to person, place, and time. She has a normal Finger-Nose-Finger Test and a normal Heel to Shin Test. Gait normal.   Reflex Scores:       Tricep reflexes are 2+ on the right side and 2+ on the left side.       Bicep reflexes are 2+ on the right side and 2+ on the left side.       Brachioradialis reflexes are 2+ on the right side and 2+ on the left side.       Patellar reflexes are 2+ on the right side and 2+ on the left side.       Achilles reflexes are 2+ on the right side and 2+ on the left side.  Psychiatric: Her speech is normal.     Neurologic Exam     Mental Status   Oriented to person, place, and time.   Registration of memory: Good recent and remote memory.   Attention: normal. Concentration: normal.   Speech: speech is normal   Level of consciousness: alert  Knowledge: consistent with education.     Cranial Nerves     CN II   Visual fields full to confrontation.   Visual acuity: normal    CN III, IV, VI   Pupils are equal, round, and reactive to light.  Extraocular motions are normal.     CN V   Facial sensation intact.   Right corneal reflex: normal  Left corneal reflex: normal    CN VII   Facial expression full, symmetric.   Right facial weakness: none  Left facial weakness: none    CN VIII   Hearing: intact    CN IX, X   Palate: symmetric    CN XI   Right sternocleidomastoid strength:  normal  Left sternocleidomastoid strength: normal    CN XII   Tongue: not atrophic  Tongue deviation: none    Motor Exam   Muscle bulk: normal  Right arm tone: normal  Left arm tone: normal  Right leg tone: normal  Left leg tone: normal    Strength   Strength 5/5 except as noted.     Sensory Exam   Light touch normal.     Gait, Coordination, and Reflexes     Gait  Gait: normal    Coordination   Finger to nose coordination: normal  Heel to shin coordination: normal    Reflexes   Right brachioradialis: 2+  Left brachioradialis: 2+  Right biceps: 2+  Left biceps: 2+  Right triceps: 2+  Left triceps: 2+  Right patellar: 2+  Left patellar: 2+  Right achilles: 2+  Left achilles: 2+  Right : 2+  Left : 2+      Assessment/Plan   Independent Review of Radiographic Studies:    I reviewed the cervical MRI done 2/2/19 which shows a very full decompression of the posterior fossa.  There is some mild degenerative disc disease at C4-C5 and C3-C4 but no significant nerve or cord impingement.  Agree with the report.      Medical Decision Making:    Unfortunate is nothing I can do about the occipital headaches and neck aches.  She will be seeing the neurologist and perhaps they can focus on a satisfactory medical treatment for her.  As far as her low back and her left leg go, I think we need to get an MRI and have her come back to discuss it.  Perhaps the starting point would be physical therapy since we would want to have avoid any epidural injections given her history.      Cindy was seen today for numbness, shoulder pain, extremity weakness, headache and back pain.    Diagnoses and all orders for this visit:    History of Chiari malformation    Other headache syndrome    Chronic bilateral low back pain with left-sided sciatica  -     MRI Lumbar Spine Without Contrast; Future      Return in about 2 weeks (around 3/27/2019) for After tests.

## 2019-03-13 ENCOUNTER — OFFICE VISIT (OUTPATIENT)
Dept: NEUROSURGERY | Facility: CLINIC | Age: 44
End: 2019-03-13

## 2019-03-13 ENCOUNTER — APPOINTMENT (OUTPATIENT)
Dept: LAB | Facility: HOSPITAL | Age: 44
End: 2019-03-13

## 2019-03-13 VITALS
DIASTOLIC BLOOD PRESSURE: 70 MMHG | WEIGHT: 175 LBS | HEIGHT: 67 IN | SYSTOLIC BLOOD PRESSURE: 100 MMHG | HEART RATE: 72 BPM | BODY MASS INDEX: 27.47 KG/M2

## 2019-03-13 DIAGNOSIS — G44.89 OTHER HEADACHE SYNDROME: ICD-10-CM

## 2019-03-13 DIAGNOSIS — Z86.69 HISTORY OF CHIARI MALFORMATION: Primary | ICD-10-CM

## 2019-03-13 DIAGNOSIS — M54.42 CHRONIC BILATERAL LOW BACK PAIN WITH LEFT-SIDED SCIATICA: ICD-10-CM

## 2019-03-13 DIAGNOSIS — G89.29 CHRONIC BILATERAL LOW BACK PAIN WITH LEFT-SIDED SCIATICA: ICD-10-CM

## 2019-03-13 LAB
25(OH)D3 SERPL-MCNC: 23.4 NG/ML (ref 30–100)
ALBUMIN SERPL-MCNC: 4.4 G/DL (ref 3.5–5.2)
ALBUMIN/GLOB SERPL: 1.5 G/DL
ALP SERPL-CCNC: 71 U/L (ref 39–117)
ALT SERPL W P-5'-P-CCNC: 15 U/L (ref 1–33)
AMYLASE SERPL-CCNC: 57 U/L (ref 28–100)
ANION GAP SERPL CALCULATED.3IONS-SCNC: 9.8 MMOL/L
AST SERPL-CCNC: 15 U/L (ref 1–32)
BASOPHILS # BLD AUTO: 0.06 10*3/MM3 (ref 0–0.2)
BASOPHILS NFR BLD AUTO: 0.7 % (ref 0–1.5)
BILIRUB SERPL-MCNC: 0.3 MG/DL (ref 0.1–1.2)
BUN BLD-MCNC: 8 MG/DL (ref 6–20)
BUN/CREAT SERPL: 10.3 (ref 7–25)
CALCIUM SPEC-SCNC: 9.6 MG/DL (ref 8.6–10.5)
CHLORIDE SERPL-SCNC: 105 MMOL/L (ref 98–107)
CHOLEST SERPL-MCNC: 197 MG/DL (ref 0–200)
CO2 SERPL-SCNC: 27.2 MMOL/L (ref 22–29)
CREAT BLD-MCNC: 0.78 MG/DL (ref 0.57–1)
DEPRECATED RDW RBC AUTO: 43.8 FL (ref 37–54)
EOSINOPHIL # BLD AUTO: 0.23 10*3/MM3 (ref 0–0.4)
EOSINOPHIL NFR BLD AUTO: 2.7 % (ref 0.3–6.2)
ERYTHROCYTE [DISTWIDTH] IN BLOOD BY AUTOMATED COUNT: 12.6 % (ref 12.3–15.4)
FOLATE SERPL-MCNC: >20 NG/ML (ref 4.78–24.2)
GFR SERPL CREATININE-BSD FRML MDRD: 81 ML/MIN/1.73
GLOBULIN UR ELPH-MCNC: 2.9 GM/DL
GLUCOSE BLD-MCNC: 87 MG/DL (ref 65–99)
HBA1C MFR BLD: 5.4 % (ref 4.8–5.6)
HCT VFR BLD AUTO: 42.7 % (ref 34–46.6)
HDLC SERPL-MCNC: 49 MG/DL (ref 40–60)
HGB BLD-MCNC: 13.8 G/DL (ref 12–15.9)
IMM GRANULOCYTES # BLD AUTO: 0.02 10*3/MM3 (ref 0–0.05)
IMM GRANULOCYTES NFR BLD AUTO: 0.2 % (ref 0–0.5)
LDLC SERPL CALC-MCNC: 125 MG/DL (ref 0–100)
LDLC/HDLC SERPL: 2.55 {RATIO}
LIPASE SERPL-CCNC: 30 U/L (ref 13–60)
LYMPHOCYTES # BLD AUTO: 2.8 10*3/MM3 (ref 0.7–3.1)
LYMPHOCYTES NFR BLD AUTO: 33 % (ref 19.6–45.3)
MCH RBC QN AUTO: 30.5 PG (ref 26.6–33)
MCHC RBC AUTO-ENTMCNC: 32.3 G/DL (ref 31.5–35.7)
MCV RBC AUTO: 94.5 FL (ref 79–97)
MONOCYTES # BLD AUTO: 0.65 10*3/MM3 (ref 0.1–0.9)
MONOCYTES NFR BLD AUTO: 7.7 % (ref 5–12)
NEUTROPHILS # BLD AUTO: 4.73 10*3/MM3 (ref 1.4–7)
NEUTROPHILS NFR BLD AUTO: 55.7 % (ref 42.7–76)
NRBC BLD AUTO-RTO: 0 /100 WBC (ref 0–0)
PLATELET # BLD AUTO: 295 10*3/MM3 (ref 140–450)
PMV BLD AUTO: 9.5 FL (ref 6–12)
POTASSIUM BLD-SCNC: 4.6 MMOL/L (ref 3.5–5.2)
PROT SERPL-MCNC: 7.3 G/DL (ref 6–8.5)
RBC # BLD AUTO: 4.52 10*6/MM3 (ref 3.77–5.28)
SODIUM BLD-SCNC: 142 MMOL/L (ref 136–145)
T3FREE SERPL-MCNC: 2.07 PG/ML (ref 2–4.4)
T4 FREE SERPL-MCNC: 0.99 NG/DL (ref 0.93–1.7)
TRIGL SERPL-MCNC: 116 MG/DL (ref 0–150)
TSH SERPL DL<=0.05 MIU/L-ACNC: 2.83 MIU/ML (ref 0.27–4.2)
VIT B12 BLD-MCNC: 687 PG/ML (ref 211–946)
VLDLC SERPL-MCNC: 23.2 MG/DL (ref 5–40)
WBC NRBC COR # BLD: 8.49 10*3/MM3 (ref 3.4–10.8)

## 2019-03-13 PROCEDURE — 80061 LIPID PANEL: CPT | Performed by: PHYSICIAN ASSISTANT

## 2019-03-13 PROCEDURE — 85025 COMPLETE CBC W/AUTO DIFF WBC: CPT | Performed by: PHYSICIAN ASSISTANT

## 2019-03-13 PROCEDURE — 80053 COMPREHEN METABOLIC PANEL: CPT | Performed by: PHYSICIAN ASSISTANT

## 2019-03-13 PROCEDURE — 82746 ASSAY OF FOLIC ACID SERUM: CPT | Performed by: PHYSICIAN ASSISTANT

## 2019-03-13 PROCEDURE — 82306 VITAMIN D 25 HYDROXY: CPT | Performed by: PHYSICIAN ASSISTANT

## 2019-03-13 PROCEDURE — 83036 HEMOGLOBIN GLYCOSYLATED A1C: CPT | Performed by: PHYSICIAN ASSISTANT

## 2019-03-13 PROCEDURE — 84439 ASSAY OF FREE THYROXINE: CPT | Performed by: PHYSICIAN ASSISTANT

## 2019-03-13 PROCEDURE — 99214 OFFICE O/P EST MOD 30 MIN: CPT | Performed by: NEUROLOGICAL SURGERY

## 2019-03-13 PROCEDURE — 82607 VITAMIN B-12: CPT | Performed by: PHYSICIAN ASSISTANT

## 2019-03-13 PROCEDURE — 84443 ASSAY THYROID STIM HORMONE: CPT | Performed by: PHYSICIAN ASSISTANT

## 2019-03-13 PROCEDURE — 83690 ASSAY OF LIPASE: CPT | Performed by: PHYSICIAN ASSISTANT

## 2019-03-13 PROCEDURE — 36415 COLL VENOUS BLD VENIPUNCTURE: CPT | Performed by: PHYSICIAN ASSISTANT

## 2019-03-13 PROCEDURE — 84481 FREE ASSAY (FT-3): CPT | Performed by: PHYSICIAN ASSISTANT

## 2019-03-13 PROCEDURE — 82150 ASSAY OF AMYLASE: CPT | Performed by: PHYSICIAN ASSISTANT

## 2019-03-13 RX ORDER — LEVOTHYROXINE SODIUM 112 UG/1
112 TABLET ORAL DAILY
Qty: 90 TABLET | Refills: 3 | Status: SHIPPED | OUTPATIENT
Start: 2019-03-13 | End: 2020-03-10 | Stop reason: SDUPTHER

## 2019-04-02 ENCOUNTER — TELEPHONE (OUTPATIENT)
Dept: NEUROSURGERY | Facility: CLINIC | Age: 44
End: 2019-04-02

## 2019-04-02 NOTE — TELEPHONE ENCOUNTER
LM for pt to call me back- was scheduled for lumbar MRI at 3.30.19 at [facility], however, there are no reports and I cannot tell if she kept her appt for the MRI- needing to know if she has had it done or not

## 2019-04-04 ENCOUNTER — APPOINTMENT (OUTPATIENT)
Dept: CT IMAGING | Facility: HOSPITAL | Age: 44
End: 2019-04-04

## 2019-04-04 ENCOUNTER — HOSPITAL ENCOUNTER (EMERGENCY)
Facility: HOSPITAL | Age: 44
Discharge: HOME | End: 2019-04-04
Attending: EMERGENCY MEDICINE | Admitting: EMERGENCY MEDICINE

## 2019-04-04 VITALS
HEART RATE: 72 BPM | DIASTOLIC BLOOD PRESSURE: 61 MMHG | RESPIRATION RATE: 18 BRPM | SYSTOLIC BLOOD PRESSURE: 117 MMHG | HEIGHT: 67 IN | WEIGHT: 185 LBS | BODY MASS INDEX: 29.03 KG/M2 | OXYGEN SATURATION: 98 % | TEMPERATURE: 98.7 F

## 2019-04-04 DIAGNOSIS — N20.0 KIDNEY STONE: Primary | ICD-10-CM

## 2019-04-04 LAB
ALBUMIN SERPL-MCNC: 4.4 G/DL (ref 3.5–5.2)
ALBUMIN/GLOB SERPL: 1.5 G/DL
ALP SERPL-CCNC: 65 U/L (ref 39–117)
ALT SERPL W P-5'-P-CCNC: 16 U/L (ref 1–33)
ANION GAP SERPL CALCULATED.3IONS-SCNC: 13.4 MMOL/L
AST SERPL-CCNC: 13 U/L (ref 1–32)
BACTERIA UR QL AUTO: ABNORMAL /HPF
BASOPHILS # BLD AUTO: 0.09 10*3/MM3 (ref 0–0.2)
BASOPHILS NFR BLD AUTO: 0.7 % (ref 0–1.5)
BILIRUB SERPL-MCNC: 0.5 MG/DL (ref 0.2–1.2)
BILIRUB UR QL STRIP: NEGATIVE
BUN BLD-MCNC: 13 MG/DL (ref 6–20)
BUN/CREAT SERPL: 13.1 (ref 7–25)
CALCIUM SPEC-SCNC: 9.2 MG/DL (ref 8.6–10.5)
CHLORIDE SERPL-SCNC: 103 MMOL/L (ref 98–107)
CLARITY UR: ABNORMAL
CO2 SERPL-SCNC: 23.6 MMOL/L (ref 22–29)
COLOR UR: YELLOW
CREAT BLD-MCNC: 0.99 MG/DL (ref 0.57–1)
DEPRECATED RDW RBC AUTO: 43.3 FL (ref 37–54)
EOSINOPHIL # BLD AUTO: 0.3 10*3/MM3 (ref 0–0.4)
EOSINOPHIL NFR BLD AUTO: 2.4 % (ref 0.3–6.2)
ERYTHROCYTE [DISTWIDTH] IN BLOOD BY AUTOMATED COUNT: 13.2 % (ref 12.3–15.4)
GFR SERPL CREATININE-BSD FRML MDRD: 62 ML/MIN/1.73
GLOBULIN UR ELPH-MCNC: 3 GM/DL
GLUCOSE BLD-MCNC: 91 MG/DL (ref 65–99)
GLUCOSE UR STRIP-MCNC: NEGATIVE MG/DL
HCG SERPL QL: NEGATIVE
HCT VFR BLD AUTO: 42.2 % (ref 34–46.6)
HGB BLD-MCNC: 13.8 G/DL (ref 12–15.9)
HGB UR QL STRIP.AUTO: ABNORMAL
HYALINE CASTS UR QL AUTO: ABNORMAL /LPF
IMM GRANULOCYTES # BLD AUTO: 0.05 10*3/MM3 (ref 0–0.05)
IMM GRANULOCYTES NFR BLD AUTO: 0.4 % (ref 0–0.5)
KETONES UR QL STRIP: ABNORMAL
LEUKOCYTE ESTERASE UR QL STRIP.AUTO: NEGATIVE
LYMPHOCYTES # BLD AUTO: 4 10*3/MM3 (ref 0.7–3.1)
LYMPHOCYTES NFR BLD AUTO: 31.6 % (ref 19.6–45.3)
MCH RBC QN AUTO: 29.8 PG (ref 26.6–33)
MCHC RBC AUTO-ENTMCNC: 32.7 G/DL (ref 31.5–35.7)
MCV RBC AUTO: 91.1 FL (ref 79–97)
MONOCYTES # BLD AUTO: 0.92 10*3/MM3 (ref 0.1–0.9)
MONOCYTES NFR BLD AUTO: 7.3 % (ref 5–12)
NEUTROPHILS # BLD AUTO: 7.28 10*3/MM3 (ref 1.4–7)
NEUTROPHILS NFR BLD AUTO: 57.6 % (ref 42.7–76)
NITRITE UR QL STRIP: NEGATIVE
NRBC BLD AUTO-RTO: 0 /100 WBC (ref 0–0)
PH UR STRIP.AUTO: 5.5 [PH] (ref 5–8)
PLATELET # BLD AUTO: 289 10*3/MM3 (ref 140–450)
PMV BLD AUTO: 9.4 FL (ref 6–12)
POTASSIUM BLD-SCNC: 3.7 MMOL/L (ref 3.5–5.2)
PROT SERPL-MCNC: 7.4 G/DL (ref 6–8.5)
PROT UR QL STRIP: NEGATIVE
RBC # BLD AUTO: 4.63 10*6/MM3 (ref 3.77–5.28)
RBC # UR: ABNORMAL /HPF
REF LAB TEST METHOD: ABNORMAL
SODIUM BLD-SCNC: 140 MMOL/L (ref 136–145)
SP GR UR STRIP: 1.02 (ref 1–1.03)
SQUAMOUS #/AREA URNS HPF: ABNORMAL /HPF
UROBILINOGEN UR QL STRIP: ABNORMAL
WBC NRBC COR # BLD: 12.64 10*3/MM3 (ref 3.4–10.8)
WBC UR QL AUTO: ABNORMAL /HPF

## 2019-04-04 PROCEDURE — 25010000002 ONDANSETRON PER 1 MG: Performed by: NURSE PRACTITIONER

## 2019-04-04 PROCEDURE — 85025 COMPLETE CBC W/AUTO DIFF WBC: CPT | Performed by: NURSE PRACTITIONER

## 2019-04-04 PROCEDURE — 99284 EMERGENCY DEPT VISIT MOD MDM: CPT

## 2019-04-04 PROCEDURE — 25010000002 MORPHINE PER 10 MG: Performed by: NURSE PRACTITIONER

## 2019-04-04 PROCEDURE — 96375 TX/PRO/DX INJ NEW DRUG ADDON: CPT

## 2019-04-04 PROCEDURE — 84703 CHORIONIC GONADOTROPIN ASSAY: CPT | Performed by: NURSE PRACTITIONER

## 2019-04-04 PROCEDURE — 80053 COMPREHEN METABOLIC PANEL: CPT | Performed by: NURSE PRACTITIONER

## 2019-04-04 PROCEDURE — 74176 CT ABD & PELVIS W/O CONTRAST: CPT

## 2019-04-04 PROCEDURE — 96361 HYDRATE IV INFUSION ADD-ON: CPT

## 2019-04-04 PROCEDURE — 25010000002 KETOROLAC TROMETHAMINE PER 15 MG: Performed by: NURSE PRACTITIONER

## 2019-04-04 PROCEDURE — 96374 THER/PROPH/DIAG INJ IV PUSH: CPT

## 2019-04-04 PROCEDURE — 81001 URINALYSIS AUTO W/SCOPE: CPT | Performed by: NURSE PRACTITIONER

## 2019-04-04 RX ORDER — ONDANSETRON 2 MG/ML
4 INJECTION INTRAMUSCULAR; INTRAVENOUS ONCE
Status: COMPLETED | OUTPATIENT
Start: 2019-04-04 | End: 2019-04-04

## 2019-04-04 RX ORDER — ONDANSETRON 4 MG/1
4 TABLET, FILM COATED ORAL EVERY 6 HOURS PRN
Qty: 12 TABLET | Refills: 0 | Status: SHIPPED | OUTPATIENT
Start: 2019-04-04 | End: 2019-06-04

## 2019-04-04 RX ORDER — MORPHINE SULFATE 2 MG/ML
4 INJECTION, SOLUTION INTRAMUSCULAR; INTRAVENOUS ONCE
Status: COMPLETED | OUTPATIENT
Start: 2019-04-04 | End: 2019-04-04

## 2019-04-04 RX ORDER — KETOROLAC TROMETHAMINE 15 MG/ML
15 INJECTION, SOLUTION INTRAMUSCULAR; INTRAVENOUS ONCE
Status: COMPLETED | OUTPATIENT
Start: 2019-04-04 | End: 2019-04-04

## 2019-04-04 RX ORDER — OXYCODONE AND ACETAMINOPHEN 7.5; 325 MG/1; MG/1
1 TABLET ORAL EVERY 6 HOURS PRN
Qty: 12 TABLET | Refills: 0 | Status: SHIPPED | OUTPATIENT
Start: 2019-04-04 | End: 2019-06-04

## 2019-04-04 RX ADMIN — SODIUM CHLORIDE 1000 ML: 9 INJECTION, SOLUTION INTRAVENOUS at 16:58

## 2019-04-04 RX ADMIN — ONDANSETRON 4 MG: 2 INJECTION INTRAMUSCULAR; INTRAVENOUS at 16:59

## 2019-04-04 RX ADMIN — KETOROLAC TROMETHAMINE 15 MG: 15 INJECTION, SOLUTION INTRAMUSCULAR; INTRAVENOUS at 17:00

## 2019-04-04 RX ADMIN — MORPHINE SULFATE 4 MG: 2 INJECTION, SOLUTION INTRAMUSCULAR; INTRAVENOUS at 17:00

## 2019-04-04 NOTE — ED PROVIDER NOTES
EMERGENCY DEPARTMENT ENCOUNTER    CHIEF COMPLAINT  Chief Complaint: flank pain  History given by:patient  History limited by:none  Time Seen: 1642  Room Number: 07/07  PMD: Lyly Back PA-C      HPI:  Pt is a 43 y.o. female who presents with right sided flank pain that radiates to her right abd. Pt states sx began suddenly 30 minutes ago. Pt also complains of N/V. Pt complains of decreased urine output. Pt denies diarrhea, dysuria, and hematuria.    Pt denies hx of kidney stones. Family at bedside.     Duration: 30 minutes PTA  Timing:costant  Location:right flank  Radiation:right abd  Quality:pain  Intensity/Severity:moderate  Progression:unchanged  Associated Symptoms:N/V, decrease urine output  Previous Episodes:No hx of kidney stones  Treatment before arrival:none    PAST MEDICAL HISTORY  Active Ambulatory Problems     Diagnosis Date Noted   • Vitamin B12 deficiency 11/21/2015   • Vitamin D deficiency disease 11/21/2015   • Acquired hypothyroidism 11/21/2015   • Arnold-Chiari malformation, type I (CMS/HCC) 11/21/2015   • Neck pain 11/21/2015   • Peripheral neuropathy 11/21/2015   • Headache 11/21/2015   • Anxiety 11/21/2015   • Hyperlipidemia 11/21/2015   • Arthritis 11/21/2015   • History of Chiari malformation 03/13/2019   • Other headache syndrome 03/13/2019   • Chronic bilateral low back pain with left-sided sciatica 03/13/2019     Resolved Ambulatory Problems     Diagnosis Date Noted   • No Resolved Ambulatory Problems     Past Medical History:   Diagnosis Date   • Anxiety    • Arnold-Chiari malformation, type I (CMS/HCC)    • Arthritis    • Chiari I malformation (CMS/HCC)    • Disease of thyroid gland    • Headache    • Hyperlipidemia    • Insomnia    • Neck pain    • Peripheral neuropathy    • Vitamin B12 deficiency    • Vitamin D deficiency disease        PAST SURGICAL HISTORY  Past Surgical History:   Procedure Laterality Date   • BRAIN SURGERY  2207 12-07 DR Peraza   • BREAST SURGERY  10/30/2013     left abscess I & D   • KNEE SURGERY         FAMILY HISTORY  Family History   Problem Relation Age of Onset   • COPD Mother    • Emphysema Mother    • Pulmonary embolism Father    • Alcohol abuse Other    • Diabetes Other    • Cancer Other    • Kidney disease Other        SOCIAL HISTORY  Social History     Socioeconomic History   • Marital status:      Spouse name: Not on file   • Number of children: 3   • Years of education: HIGHSCHOOL   • Highest education level: Not on file   Occupational History   • Occupation: UNEMPLOYED   Tobacco Use   • Smoking status: Current Every Day Smoker     Packs/day: 0.50     Last attempt to quit: 1/23/2016     Years since quitting: 3.1   • Smokeless tobacco: Never Used   Substance and Sexual Activity   • Alcohol use: No   • Drug use: No   • Sexual activity: Defer         ALLERGIES  Patient has no known allergies.    REVIEW OF SYSTEMS  Review of Systems   Gastrointestinal: Positive for abdominal pain (right), nausea and vomiting. Negative for diarrhea.   Genitourinary: Positive for flank pain (right). Negative for dysuria and hematuria.        (+) decrease urine output       PHYSICAL EXAM  ED Triage Vitals [04/04/19 1614]   Temp Heart Rate Resp BP SpO2   98.7 °F (37.1 °C) 97 18 -- 98 %      Temp src Heart Rate Source Patient Position BP Location FiO2 (%)   Tympanic Monitor -- -- --       Physical Exam   Constitutional: She appears distressed.   HENT:   Head: Normocephalic and atraumatic.   Mouth/Throat: Oropharynx is clear and moist and mucous membranes are normal.   Eyes: Pupils are equal, round, and reactive to light.   Neck: Normal range of motion.   Cardiovascular: Normal rate, regular rhythm and normal heart sounds.   Pulmonary/Chest: Effort normal and breath sounds normal. She has no wheezes.   Abdominal: Soft. Bowel sounds are normal. There is no tenderness. There is CVA tenderness ( right).   Musculoskeletal: Normal range of motion. She exhibits no edema.    Neurological: She is alert.   Skin: Skin is warm and dry. No rash noted.   Psychiatric: Mood, memory, affect and judgment normal.   Nursing note and vitals reviewed.      LAB RESULTS  Recent Results (from the past 24 hour(s))   Comprehensive Metabolic Panel    Collection Time: 04/04/19  4:57 PM   Result Value Ref Range    Glucose 91 65 - 99 mg/dL    BUN 13 6 - 20 mg/dL    Creatinine 0.99 0.57 - 1.00 mg/dL    Sodium 140 136 - 145 mmol/L    Potassium 3.7 3.5 - 5.2 mmol/L    Chloride 103 98 - 107 mmol/L    CO2 23.6 22.0 - 29.0 mmol/L    Calcium 9.2 8.6 - 10.5 mg/dL    Total Protein 7.4 6.0 - 8.5 g/dL    Albumin 4.40 3.50 - 5.20 g/dL    ALT (SGPT) 16 1 - 33 U/L    AST (SGOT) 13 1 - 32 U/L    Alkaline Phosphatase 65 39 - 117 U/L    Total Bilirubin 0.5 0.2 - 1.2 mg/dL    eGFR Non African Amer 62 >60 mL/min/1.73    Globulin 3.0 gm/dL    A/G Ratio 1.5 g/dL    BUN/Creatinine Ratio 13.1 7.0 - 25.0    Anion Gap 13.4 mmol/L   hCG, Serum, Qualitative    Collection Time: 04/04/19  4:57 PM   Result Value Ref Range    HCG Qualitative Negative Negative   CBC Auto Differential    Collection Time: 04/04/19  4:57 PM   Result Value Ref Range    WBC 12.64 (H) 3.40 - 10.80 10*3/mm3    RBC 4.63 3.77 - 5.28 10*6/mm3    Hemoglobin 13.8 12.0 - 15.9 g/dL    Hematocrit 42.2 34.0 - 46.6 %    MCV 91.1 79.0 - 97.0 fL    MCH 29.8 26.6 - 33.0 pg    MCHC 32.7 31.5 - 35.7 g/dL    RDW 13.2 12.3 - 15.4 %    RDW-SD 43.3 37.0 - 54.0 fl    MPV 9.4 6.0 - 12.0 fL    Platelets 289 140 - 450 10*3/mm3    Neutrophil % 57.6 42.7 - 76.0 %    Lymphocyte % 31.6 19.6 - 45.3 %    Monocyte % 7.3 5.0 - 12.0 %    Eosinophil % 2.4 0.3 - 6.2 %    Basophil % 0.7 0.0 - 1.5 %    Immature Grans % 0.4 0.0 - 0.5 %    Neutrophils, Absolute 7.28 (H) 1.40 - 7.00 10*3/mm3    Lymphocytes, Absolute 4.00 (H) 0.70 - 3.10 10*3/mm3    Monocytes, Absolute 0.92 (H) 0.10 - 0.90 10*3/mm3    Eosinophils, Absolute 0.30 0.00 - 0.40 10*3/mm3    Basophils, Absolute 0.09 0.00 - 0.20 10*3/mm3     Immature Grans, Absolute 0.05 0.00 - 0.05 10*3/mm3    nRBC 0.0 0.0 - 0.0 /100 WBC   Urinalysis With Microscopic If Indicated (No Culture) - Urine, Clean Catch    Collection Time: 04/04/19  6:32 PM   Result Value Ref Range    Color, UA Yellow Yellow, Straw    Appearance, UA Cloudy (A) Clear    pH, UA 5.5 5.0 - 8.0    Specific Gravity, UA 1.024 1.005 - 1.030    Glucose, UA Negative Negative    Ketones, UA Trace (A) Negative    Bilirubin, UA Negative Negative    Blood, UA Large (3+) (A) Negative    Protein, UA Negative Negative    Leuk Esterase, UA Negative Negative    Nitrite, UA Negative Negative    Urobilinogen, UA 1.0 E.U./dL 0.2 - 1.0 E.U./dL   Urinalysis, Microscopic Only - Urine, Clean Catch    Collection Time: 04/04/19  6:32 PM   Result Value Ref Range    RBC, UA 6-12 (A) None Seen, 0-2 /HPF    WBC, UA 0-2 None Seen, 0-2 /HPF    Bacteria, UA None Seen None Seen /HPF    Squamous Epithelial Cells, UA 7-12 (A) None Seen, 0-2 /HPF    Hyaline Casts, UA 7-12 None Seen /LPF    Methodology Manual Light Microscopy        I ordered the above labs and reviewed the results    RADIOLOGY  CT Abdomen Pelvis Without Contrast   Preliminary Result   1.  A 0.7 cm calculus within the distal right ureter at the right   ureterovesical junction with mild upstream hydroureteronephrosis.   2.  Left nephrolithiasis.       The above findings were discussed with Linda Patel by telephone by   Stu De Dios at 6:28 PM on  04/03/2019.              I ordered the above noted radiological studies and reviewed the images on the PACS system.  Spoke with Dr. De Dios regarding CT scan results        PROGRESS AND CONSULTS    1804- Reviewed pt's history and workup with Dr. Holt. Care turned over to Dr. Holt  After a bedside evaluation; Dr Holt agrees with the plan of care    1829-Per , CT abd/pelvis shows a 7mm UVJ stone with mild hydronephrosis.      1917-Informed pt of CT abd/pelvis shows a left UVJ stone. Plan to discharge pt with  "prescription for Percocet and Zofran. Pt resting painful.  Informed pt if sx do not improve she will need to f/u with urology.     COURSE & MEDICAL DECISION MAKING  Pertinent Labs and Imaging studies that were ordered and reviewed are noted above.  Results were reviewed/discussed with the patient and they were also made aware of online assess.   Pt also made aware that some labs, such as cultures, will not be resulted during ER visit and follow up with PMD is necessary.     MEDICATIONS GIVEN IN ER  Medications   sodium chloride 0.9 % bolus 1,000 mL (0 mL Intravenous Stopped 4/4/19 1832)   morphine injection 4 mg (4 mg Intravenous Given 4/4/19 1700)   ondansetron (ZOFRAN) injection 4 mg (4 mg Intravenous Given 4/4/19 1659)   ketorolac (TORADOL) injection 15 mg (15 mg Intravenous Given 4/4/19 1700)       /46   Pulse 68   Temp 98.7 °F (37.1 °C) (Tympanic)   Resp 18   Ht 170.2 cm (67\")   Wt 83.9 kg (185 lb)   LMP 03/10/2019 (Exact Date)   SpO2 100%   BMI 28.98 kg/m²     Discussed all results and noted any abnormalities with patient.  Discussed absoute need to recheck abnormalities with her PCP and urology.    Reviewed implications of results, diagnosis, meds, responsibility to follow up, warning signs and symptoms of possible worsening, potential complications and reasons to return to ER with patient    Discussed plan for discharge, as there is no emergent indication for admission.  Pt is agreeable and understands need for follow up and repeat testing.  Pt is aware that discharge does not mean that nothing is wrong but it indicates no emergency is present and they must continue care with her PCP and urology.  Pt is discharged with instructions to follow up with primary care doctor to have their blood pressure rechecked.       DIAGNOSIS  Final diagnoses:   Kidney stone       FOLLOW UP   Lyly Back, MARIO  [address] [facility].[address] [place], [place] [id number] [phone]    Schedule an appointment as soon " as possible for a visit       [facility]  [address] [facility] [address]  [phone]  Schedule an appointment as soon as possible for a visit         RX     Medication List      New Prescriptions    ondansetron 4 MG tablet  Commonly known as:  ZOFRAN  Take 1 tablet by mouth Every 6 (Six) Hours As Needed for Nausea or   Vomiting.     oxyCODONE-acetaminophen 7.5-325 MG per tablet  Commonly known as:  PERCOCET  Take 1 tablet by mouth Every 6 (Six) Hours As Needed for Severe Pain .              I personally reviewed the past medical history, past surgical history, social history, family history, current medications and allergies as they appear in this chart.  The scribe's note accurately reflects the work and decisions made by me.         Documentation assistance provided by ranjana Ramos for MIKE Trivedi.  Information recorded by the scribe was done at my direction and has been verified and validated by me.                 Mercedes Ramos  04/04/19 [id number]       Karissa Patel APRN  04/04/19 2042

## 2019-04-04 NOTE — ED PROVIDER NOTES
Pt presents to the ED c/o right flank pain that began abruptly prior to arrival. She states that the pain radiates into the right abd. She also complains of nausea and vomiting. She denies hx of kidney stones, but states that several family members have a hx of kidney stones. On exam, Pt is resting comfortably, in no distress, and without focal neurologic deficit. Cardiovascular exam is within normal limits and without murmur. Mld right CVA tenderness. Abd is soft and non tender. Plan for labs and CT abd/pelvis.     Attestation:  The MANUELA and I have discussed this patient's history, physical exam, and treatment plan.  I have reviewed the documentation and personally had a face to face interaction with the patient. I affirm the documentation and agree with the treatment and plan.  The attached note describes my personal findings.      Documentation assistance provided by ranjana Barajas for Dr Holt. Information recorded by the scribe was done at my direction and has been verified and validated by me.     Suzy Barajas  04/04/19 [id number]       Anton Holt MD  04/05/19 2910

## 2019-04-04 NOTE — DISCHARGE INSTRUCTIONS
Pt instructions:  Rest  Drink plenty of water  Follow up with Primary Care Doctor for further management and to have your blood pressure rechecked.   Return to ER with pain, swelling, numbness/tingling, fever, chills, weakness, nausea, vomiting, diarrhea, abdominal pain, back pain, urinary concerns, chest pain, shortness of breath, dizziness, headache, worsening of symptoms or other concerns.

## 2019-06-04 ENCOUNTER — OFFICE VISIT (OUTPATIENT)
Dept: FAMILY MEDICINE CLINIC | Facility: CLINIC | Age: 44
End: 2019-06-04

## 2019-06-04 VITALS
RESPIRATION RATE: 16 BRPM | WEIGHT: 180 LBS | DIASTOLIC BLOOD PRESSURE: 74 MMHG | HEIGHT: 67 IN | OXYGEN SATURATION: 99 % | SYSTOLIC BLOOD PRESSURE: 110 MMHG | BODY MASS INDEX: 28.25 KG/M2 | TEMPERATURE: 98.3 F | HEART RATE: 75 BPM

## 2019-06-04 DIAGNOSIS — G44.229 CHRONIC TENSION-TYPE HEADACHE, NOT INTRACTABLE: ICD-10-CM

## 2019-06-04 DIAGNOSIS — F41.9 ANXIETY: ICD-10-CM

## 2019-06-04 DIAGNOSIS — Z86.69 HISTORY OF CHIARI MALFORMATION: ICD-10-CM

## 2019-06-04 DIAGNOSIS — E53.8 VITAMIN B12 DEFICIENCY: ICD-10-CM

## 2019-06-04 DIAGNOSIS — E03.9 ACQUIRED HYPOTHYROIDISM: Primary | ICD-10-CM

## 2019-06-04 DIAGNOSIS — E78.2 MIXED HYPERLIPIDEMIA: ICD-10-CM

## 2019-06-04 PROCEDURE — 96372 THER/PROPH/DIAG INJ SC/IM: CPT | Performed by: PHYSICIAN ASSISTANT

## 2019-06-04 PROCEDURE — 99214 OFFICE O/P EST MOD 30 MIN: CPT | Performed by: PHYSICIAN ASSISTANT

## 2019-06-04 RX ORDER — SERTRALINE HYDROCHLORIDE 100 MG/1
TABLET, FILM COATED ORAL
Qty: 45 TABLET | Refills: 11 | Status: SHIPPED | OUTPATIENT
Start: 2019-06-04 | End: 2019-09-09 | Stop reason: SDUPTHER

## 2019-06-04 RX ADMIN — CYANOCOBALAMIN 1000 MCG: 1000 INJECTION, SOLUTION INTRAMUSCULAR; SUBCUTANEOUS at 14:00

## 2019-06-04 NOTE — PATIENT INSTRUCTIONS
Warned patient that this medication can cause drowsiness and impair them operating machinery, including driving a car.  Caution is advised.  Low glycemic index diet  Exercise 30 minutes most days of the week  Make sure you get results on any labs or tests we ordered today  We discussed medications and how to take them as prescribed  Sleep 6-8 hours each night if possible  If you have not signed up for [employer], please activate your code ASAP from your After Visit Summary today    LDL goal <100  LDL goal if heart disease <70  HDL goal >60  Triglyceride goal <150  BP goal =<130/80  Fasting glucose <100

## 2019-06-04 NOTE — PROGRESS NOTES
Subjective   Cindy Hawk is a 43 y.o. female.     History of Present Illness   Cindy Hawk 43 y.o. female who presents today for routine follow up check and medication refills.  she has a history of   Patient Active Problem List   Diagnosis   • Vitamin B12 deficiency   • Vitamin D deficiency disease   • Acquired hypothyroidism   • Arnold-Chiari malformation, type I (CMS/HCC)   • Neck pain   • Peripheral neuropathy   • Headache   • Anxiety   • Hyperlipidemia   • Arthritis   • History of Chiari malformation   • Other headache syndrome   • Chronic bilateral low back pain with left-sided sciatica   .  Since the last visit, she has overall felt tired.  She has Hypothyroidism and here to discuss abnormal lab results and Vitamin D deficiency and will update labs to confirm level is at goal >30.  she has been compliant with current medications have reviewed them.  The patient denies medication side effects.    Results for orders placed or performed during the hospital encounter of 04/04/19   Comprehensive Metabolic Panel   Result Value Ref Range    Glucose 91 65 - 99 mg/dL    BUN 13 6 - 20 mg/dL    Creatinine 0.99 0.57 - 1.00 mg/dL    Sodium 140 136 - 145 mmol/L    Potassium 3.7 3.5 - 5.2 mmol/L    Chloride 103 98 - 107 mmol/L    CO2 23.6 22.0 - 29.0 mmol/L    Calcium 9.2 8.6 - 10.5 mg/dL    Total Protein 7.4 6.0 - 8.5 g/dL    Albumin 4.40 3.50 - 5.20 g/dL    ALT (SGPT) 16 1 - 33 U/L    AST (SGOT) 13 1 - 32 U/L    Alkaline Phosphatase 65 39 - 117 U/L    Total Bilirubin 0.5 0.2 - 1.2 mg/dL    eGFR Non African Amer 62 >60 mL/min/1.73    Globulin 3.0 gm/dL    A/G Ratio 1.5 g/dL    BUN/Creatinine Ratio 13.1 7.0 - 25.0    Anion Gap 13.4 mmol/L   Urinalysis With Microscopic If Indicated (No Culture) - Urine, Clean Catch   Result Value Ref Range    Color, UA Yellow Yellow, Straw    Appearance, UA Cloudy (A) Clear    pH, UA 5.5 5.0 - 8.0    Specific Gravity, UA 1.024 1.005 - 1.030    Glucose, UA Negative Negative     Ketones, UA Trace (A) Negative    Bilirubin, UA Negative Negative    Blood, UA Large (3+) (A) Negative    Protein, UA Negative Negative    Leuk Esterase, UA Negative Negative    Nitrite, UA Negative Negative    Urobilinogen, UA 1.0 E.U./dL 0.2 - 1.0 E.U./dL   hCG, Serum, Qualitative   Result Value Ref Range    HCG Qualitative Negative Negative   CBC Auto Differential   Result Value Ref Range    WBC 12.64 (H) 3.40 - 10.80 10*3/mm3    RBC 4.63 3.77 - 5.28 10*6/mm3    Hemoglobin 13.8 12.0 - 15.9 g/dL    Hematocrit 42.2 34.0 - 46.6 %    MCV 91.1 79.0 - 97.0 fL    MCH 29.8 26.6 - 33.0 pg    MCHC 32.7 31.5 - 35.7 g/dL    RDW 13.2 12.3 - 15.4 %    RDW-SD 43.3 37.0 - 54.0 fl    MPV 9.4 6.0 - 12.0 fL    Platelets 289 140 - 450 10*3/mm3    Neutrophil % 57.6 42.7 - 76.0 %    Lymphocyte % 31.6 19.6 - 45.3 %    Monocyte % 7.3 5.0 - 12.0 %    Eosinophil % 2.4 0.3 - 6.2 %    Basophil % 0.7 0.0 - 1.5 %    Immature Grans % 0.4 0.0 - 0.5 %    Neutrophils, Absolute 7.28 (H) 1.40 - 7.00 10*3/mm3    Lymphocytes, Absolute 4.00 (H) 0.70 - 3.10 10*3/mm3    Monocytes, Absolute 0.92 (H) 0.10 - 0.90 10*3/mm3    Eosinophils, Absolute 0.30 0.00 - 0.40 10*3/mm3    Basophils, Absolute 0.09 0.00 - 0.20 10*3/mm3    Immature Grans, Absolute 0.05 0.00 - 0.05 10*3/mm3    nRBC 0.0 0.0 - 0.0 /100 WBC   Urinalysis, Microscopic Only - Urine, Clean Catch   Result Value Ref Range    RBC, UA 6-12 (A) None Seen, 0-2 /HPF    WBC, UA 0-2 None Seen, 0-2 /HPF    Bacteria, UA None Seen None Seen /HPF    Squamous Epithelial Cells, UA 7-12 (A) None Seen, 0-2 /HPF    Hyaline Casts, UA 7-12 None Seen /LPF    Methodology Manual Light Microscopy    Needs refill Ultram for the chronic daily Chiari h/a's  I will have her get B12 today and failed OTC  Still need MRI lumbar spine     Still having chronic daily h/a ----again, see neuro  She is still having h/a's and blurry vision  Cindy Hawk 43 y.o. female who presents today for routine follow up check and  medication refills.  she has a history of   Patient Active Problem List   Diagnosis   • Vitamin B12 deficiency   • Vitamin D deficiency disease   • Acquired hypothyroidism   • Arnold-Chiari malformation, type I (CMS/HCC)   • Neck pain   • Peripheral neuropathy   • Headache   • Anxiety   • Hyperlipidemia   • Arthritis   • History of Chiari malformation   • Other headache syndrome   • Chronic bilateral low back pain with left-sided sciatica   .  Since the last visit, she has overall felt tired.  She has Hyperlipidemia and working on this with diet and exercise, Hypothyroidism and here to discuss abnormal lab results and Vitamin D deficiency and will update labs to confirm level is at goal >30.  she has been compliant with current medications have reviewed them.  The patient denies medication side effects.  Notice less N/T hands; gone in feet  She went to urologist f/u stone----passed stone and no surgery. DR Gomes  More active this summer  Results for orders placed or performed during the hospital encounter of 04/04/19   Comprehensive Metabolic Panel   Result Value Ref Range    Glucose 91 65 - 99 mg/dL    BUN 13 6 - 20 mg/dL    Creatinine 0.99 0.57 - 1.00 mg/dL    Sodium 140 136 - 145 mmol/L    Potassium 3.7 3.5 - 5.2 mmol/L    Chloride 103 98 - 107 mmol/L    CO2 23.6 22.0 - 29.0 mmol/L    Calcium 9.2 8.6 - 10.5 mg/dL    Total Protein 7.4 6.0 - 8.5 g/dL    Albumin 4.40 3.50 - 5.20 g/dL    ALT (SGPT) 16 1 - 33 U/L    AST (SGOT) 13 1 - 32 U/L    Alkaline Phosphatase 65 39 - 117 U/L    Total Bilirubin 0.5 0.2 - 1.2 mg/dL    eGFR Non African Amer 62 >60 mL/min/1.73    Globulin 3.0 gm/dL    A/G Ratio 1.5 g/dL    BUN/Creatinine Ratio 13.1 7.0 - 25.0    Anion Gap 13.4 mmol/L   Urinalysis With Microscopic If Indicated (No Culture) - Urine, Clean Catch   Result Value Ref Range    Color, UA Yellow Yellow, Straw    Appearance, UA Cloudy (A) Clear    pH, UA 5.5 5.0 - 8.0    Specific Gravity, UA 1.024 1.005 - 1.030    Glucose,  UA Negative Negative    Ketones, UA Trace (A) Negative    Bilirubin, UA Negative Negative    Blood, UA Large (3+) (A) Negative    Protein, UA Negative Negative    Leuk Esterase, UA Negative Negative    Nitrite, UA Negative Negative    Urobilinogen, UA 1.0 E.U./dL 0.2 - 1.0 E.U./dL   hCG, Serum, Qualitative   Result Value Ref Range    HCG Qualitative Negative Negative   CBC Auto Differential   Result Value Ref Range    WBC 12.64 (H) 3.40 - 10.80 10*3/mm3    RBC 4.63 3.77 - 5.28 10*6/mm3    Hemoglobin 13.8 12.0 - 15.9 g/dL    Hematocrit 42.2 34.0 - 46.6 %    MCV 91.1 79.0 - 97.0 fL    MCH 29.8 26.6 - 33.0 pg    MCHC 32.7 31.5 - 35.7 g/dL    RDW 13.2 12.3 - 15.4 %    RDW-SD 43.3 37.0 - 54.0 fl    MPV 9.4 6.0 - 12.0 fL    Platelets 289 140 - 450 10*3/mm3    Neutrophil % 57.6 42.7 - 76.0 %    Lymphocyte % 31.6 19.6 - 45.3 %    Monocyte % 7.3 5.0 - 12.0 %    Eosinophil % 2.4 0.3 - 6.2 %    Basophil % 0.7 0.0 - 1.5 %    Immature Grans % 0.4 0.0 - 0.5 %    Neutrophils, Absolute 7.28 (H) 1.40 - 7.00 10*3/mm3    Lymphocytes, Absolute 4.00 (H) 0.70 - 3.10 10*3/mm3    Monocytes, Absolute 0.92 (H) 0.10 - 0.90 10*3/mm3    Eosinophils, Absolute 0.30 0.00 - 0.40 10*3/mm3    Basophils, Absolute 0.09 0.00 - 0.20 10*3/mm3    Immature Grans, Absolute 0.05 0.00 - 0.05 10*3/mm3    nRBC 0.0 0.0 - 0.0 /100 WBC   Urinalysis, Microscopic Only - Urine, Clean Catch   Result Value Ref Range    RBC, UA 6-12 (A) None Seen, 0-2 /HPF    WBC, UA 0-2 None Seen, 0-2 /HPF    Bacteria, UA None Seen None Seen /HPF    Squamous Epithelial Cells, UA 7-12 (A) None Seen, 0-2 /HPF    Hyaline Casts, UA 7-12 None Seen /LPF    Methodology Manual Light Microscopy       Cindy B Carine female 42 y.o. who presents today for follow up of Anxiety.  She reports medication is working well, patient desires to continue on Rx, and needs refill. Onset of symptoms was approximately several years ago.  She denies current suicidal and homicidal ideation. Risk factors are  family history of anxiety and or depression and lifestyle of multiple roles.  Previous treatment includes current Rx.  She complains of the following medication side effects: none.  The patient declines to go to counseling..    I do want Vit D daily OTC  F/u on thyroid labs  Stay on B12    The patient has read and signed the [facility] Controlled Substance Contract.  I will continue to see patient for regular follow up appointments.  They are well controlled on their medication.  LISA has been reviewed by me and is updated every 3 months. The patient is aware of the potential for addiction and dependence.    The following portions of the patient's history were reviewed and updated as appropriate: allergies, current medications, past family history, past medical history, past social history, past surgical history and problem list.    Review of Systems   Constitutional: Positive for fatigue. Negative for activity change, appetite change and unexpected weight change.   HENT: Positive for tinnitus. Negative for nosebleeds and trouble swallowing.    Eyes: Positive for visual disturbance. Negative for pain.   Respiratory: Negative for chest tightness, shortness of breath and wheezing.    Cardiovascular: Negative for chest pain and palpitations.   Gastrointestinal: Negative for abdominal pain and blood in stool.   Endocrine: Negative.    Genitourinary: Negative for difficulty urinating and hematuria.   Musculoskeletal: Positive for back pain, neck pain and neck stiffness. Negative for joint swelling.   Skin: Negative for color change and rash.   Allergic/Immunologic: Negative.    Neurological: Positive for dizziness, weakness and headaches. Negative for syncope and speech difficulty.   Hematological: Negative for adenopathy.   Psychiatric/Behavioral: Negative for agitation and confusion.   All other systems reviewed and are negative.      Objective   Physical Exam   Constitutional: She is oriented to person, place,  and time. She appears well-developed and well-nourished. No distress.   HENT:   Head: Normocephalic and atraumatic.   Eyes: Conjunctivae and EOM are normal. Pupils are equal, round, and reactive to light. Right eye exhibits no discharge. Left eye exhibits no discharge. No scleral icterus.   Neck: Normal range of motion. Neck supple. No tracheal deviation present. No thyromegaly present.   Cardiovascular: Normal rate, regular rhythm, normal heart sounds, intact distal pulses and normal pulses. Exam reveals no gallop.   No murmur heard.  Pulmonary/Chest: Effort normal and breath sounds normal. No respiratory distress. She has no wheezes. She has no rales.   Musculoskeletal: Normal range of motion.   Neurological: She is alert and oriented to person, place, and time. She exhibits normal muscle tone. Coordination normal.   Hands N/T some but better   Skin: Skin is warm. No rash noted. No erythema. No pallor.   Psychiatric: She has a normal mood and affect. Her behavior is normal. Judgment and thought content normal.   Nursing note and vitals reviewed.      Assessment/Plan   Problems Addressed this Visit        Cardiovascular and Mediastinum    Hyperlipidemia       Digestive    Vitamin B12 deficiency       Endocrine    Acquired hypothyroidism - Primary    Relevant Orders    T3, Free    T4, Free    TSH       Nervous and Auditory    Headache       Other    Anxiety    History of Chiari malformation        In summary, Cindy B Carine, was seen today.  she was seen for  Hyperlipidemia and working on this with diet and exercise, Hypothyroidism and will need to update labs for continued treatment and Vitamin D deficiency and will update labs to confirm level is at goal >30,    Cont. Zoloft for anxiety  I will have her get B12 today and failed OTC    I do want Vit D daily OTC  F/u on thyroid labs  Stay on B12  See neurologist

## 2019-06-06 RX ORDER — TRAMADOL HYDROCHLORIDE 50 MG/1
50 TABLET ORAL EVERY 6 HOURS PRN
Qty: 90 TABLET | Refills: 2 | Status: SHIPPED | OUTPATIENT
Start: 2019-06-06 | End: 2019-09-09 | Stop reason: SDUPTHER

## 2019-07-24 ENCOUNTER — CLINICAL SUPPORT (OUTPATIENT)
Dept: FAMILY MEDICINE CLINIC | Facility: CLINIC | Age: 44
End: 2019-07-24

## 2019-07-24 DIAGNOSIS — E53.8 VITAMIN B12 DEFICIENCY: Primary | ICD-10-CM

## 2019-07-24 PROCEDURE — 96372 THER/PROPH/DIAG INJ SC/IM: CPT | Performed by: PHYSICIAN ASSISTANT

## 2019-07-24 RX ADMIN — CYANOCOBALAMIN 1000 MCG: 1000 INJECTION, SOLUTION INTRAMUSCULAR; SUBCUTANEOUS at 07:54

## 2019-09-04 ENCOUNTER — APPOINTMENT (OUTPATIENT)
Dept: LAB | Facility: HOSPITAL | Age: 44
End: 2019-09-04

## 2019-09-04 LAB
T3FREE SERPL-MCNC: 2.44 PG/ML (ref 2–4.4)
T4 FREE SERPL-MCNC: 1.45 NG/DL (ref 0.93–1.7)
TSH SERPL DL<=0.05 MIU/L-ACNC: 1.74 UIU/ML (ref 0.27–4.2)

## 2019-09-04 PROCEDURE — 84481 FREE ASSAY (FT-3): CPT | Performed by: PHYSICIAN ASSISTANT

## 2019-09-04 PROCEDURE — 84443 ASSAY THYROID STIM HORMONE: CPT | Performed by: PHYSICIAN ASSISTANT

## 2019-09-04 PROCEDURE — 84439 ASSAY OF FREE THYROXINE: CPT | Performed by: PHYSICIAN ASSISTANT

## 2019-09-04 PROCEDURE — 36415 COLL VENOUS BLD VENIPUNCTURE: CPT | Performed by: PHYSICIAN ASSISTANT

## 2019-09-09 ENCOUNTER — OFFICE VISIT (OUTPATIENT)
Dept: FAMILY MEDICINE CLINIC | Facility: CLINIC | Age: 44
End: 2019-09-09

## 2019-09-09 VITALS
WEIGHT: 177 LBS | SYSTOLIC BLOOD PRESSURE: 110 MMHG | HEIGHT: 67 IN | TEMPERATURE: 98.6 F | DIASTOLIC BLOOD PRESSURE: 72 MMHG | HEART RATE: 79 BPM | BODY MASS INDEX: 27.78 KG/M2 | RESPIRATION RATE: 16 BRPM | OXYGEN SATURATION: 100 %

## 2019-09-09 DIAGNOSIS — E78.2 MIXED HYPERLIPIDEMIA: ICD-10-CM

## 2019-09-09 DIAGNOSIS — E03.9 ACQUIRED HYPOTHYROIDISM: Primary | ICD-10-CM

## 2019-09-09 DIAGNOSIS — G44.89 OTHER HEADACHE SYNDROME: ICD-10-CM

## 2019-09-09 DIAGNOSIS — G62.9 PERIPHERAL POLYNEUROPATHY: ICD-10-CM

## 2019-09-09 DIAGNOSIS — E53.8 VITAMIN B12 DEFICIENCY: ICD-10-CM

## 2019-09-09 DIAGNOSIS — E55.9 VITAMIN D DEFICIENCY DISEASE: ICD-10-CM

## 2019-09-09 DIAGNOSIS — Z86.69 HISTORY OF CHIARI MALFORMATION: ICD-10-CM

## 2019-09-09 DIAGNOSIS — F41.9 ANXIETY: ICD-10-CM

## 2019-09-09 DIAGNOSIS — M54.2 NECK PAIN: ICD-10-CM

## 2019-09-09 PROCEDURE — 99214 OFFICE O/P EST MOD 30 MIN: CPT | Performed by: PHYSICIAN ASSISTANT

## 2019-09-09 PROCEDURE — 96372 THER/PROPH/DIAG INJ SC/IM: CPT | Performed by: PHYSICIAN ASSISTANT

## 2019-09-09 RX ORDER — TRAMADOL HYDROCHLORIDE 50 MG/1
50 TABLET ORAL EVERY 6 HOURS PRN
Qty: 90 TABLET | Refills: 2 | Status: SHIPPED | OUTPATIENT
Start: 2019-09-09 | End: 2019-12-10 | Stop reason: SDUPTHER

## 2019-09-09 RX ORDER — SERTRALINE HYDROCHLORIDE 100 MG/1
TABLET, FILM COATED ORAL
Qty: 60 TABLET | Refills: 11 | Status: SHIPPED | OUTPATIENT
Start: 2019-09-09 | End: 2020-11-19 | Stop reason: SDUPTHER

## 2019-09-09 RX ADMIN — CYANOCOBALAMIN 1000 MCG: 1000 INJECTION, SOLUTION INTRAMUSCULAR; SUBCUTANEOUS at 08:53

## 2019-12-10 ENCOUNTER — OFFICE VISIT (OUTPATIENT)
Dept: FAMILY MEDICINE CLINIC | Facility: CLINIC | Age: 44
End: 2019-12-10

## 2019-12-10 VITALS
OXYGEN SATURATION: 100 % | SYSTOLIC BLOOD PRESSURE: 120 MMHG | RESPIRATION RATE: 16 BRPM | BODY MASS INDEX: 28.72 KG/M2 | WEIGHT: 183 LBS | TEMPERATURE: 98.1 F | HEIGHT: 67 IN | DIASTOLIC BLOOD PRESSURE: 72 MMHG | HEART RATE: 68 BPM

## 2019-12-10 DIAGNOSIS — E55.9 VITAMIN D DEFICIENCY DISEASE: ICD-10-CM

## 2019-12-10 DIAGNOSIS — G44.89 OTHER HEADACHE SYNDROME: ICD-10-CM

## 2019-12-10 DIAGNOSIS — F41.9 ANXIETY: ICD-10-CM

## 2019-12-10 DIAGNOSIS — M54.2 NECK PAIN: ICD-10-CM

## 2019-12-10 DIAGNOSIS — Z00.00 LABORATORY EXAMINATION ORDERED AS PART OF A ROUTINE GENERAL MEDICAL EXAMINATION: ICD-10-CM

## 2019-12-10 DIAGNOSIS — E03.9 ACQUIRED HYPOTHYROIDISM: ICD-10-CM

## 2019-12-10 DIAGNOSIS — G93.5 ARNOLD-CHIARI MALFORMATION, TYPE I (HCC): ICD-10-CM

## 2019-12-10 DIAGNOSIS — E78.2 MIXED HYPERLIPIDEMIA: ICD-10-CM

## 2019-12-10 DIAGNOSIS — G62.9 PERIPHERAL POLYNEUROPATHY: Primary | ICD-10-CM

## 2019-12-10 DIAGNOSIS — E53.8 VITAMIN B12 DEFICIENCY: ICD-10-CM

## 2019-12-10 PROCEDURE — 96372 THER/PROPH/DIAG INJ SC/IM: CPT | Performed by: PHYSICIAN ASSISTANT

## 2019-12-10 PROCEDURE — 99214 OFFICE O/P EST MOD 30 MIN: CPT | Performed by: PHYSICIAN ASSISTANT

## 2019-12-10 RX ORDER — TRAMADOL HYDROCHLORIDE 50 MG/1
50 TABLET ORAL EVERY 6 HOURS PRN
Qty: 90 TABLET | Refills: 2 | Status: SHIPPED | OUTPATIENT
Start: 2019-12-10 | End: 2020-03-10 | Stop reason: SDUPTHER

## 2019-12-10 RX ADMIN — CYANOCOBALAMIN 1000 MCG: 1000 INJECTION, SOLUTION INTRAMUSCULAR; SUBCUTANEOUS at 08:12

## 2019-12-10 NOTE — PROGRESS NOTES
"Subjective   Cindy Hawk is a 44 y.o. female.     History of Present Illness    Since the last visit, she has overall felt tired.  She has Hyperlipidemia and working on this with diet and exercise, Hypothyroidism well controlled on current medication and will continue Rx, Seasonal allergies and doing well on their medication  and Vitamin D deficiency and labs are at goal >30 ng/mL.  she has been compliant with current medications have reviewed them.  The patient denies medication side effects.  Will refill medications. /72 (BP Location: Left arm, Patient Position: Sitting, Cuff Size: Large Adult)   Pulse 68   Temp 98.1 °F (36.7 °C) (Oral)   Resp 16   Ht 170.2 cm (67\")   Wt 83 kg (183 lb)   LMP 12/05/2019   SpO2 100%   BMI 28.66 kg/m²   Needs pap  Results for orders placed or performed in visit on 06/04/19   T3, Free   Result Value Ref Range    T3, Free 2.44 2.00 - 4.40 pg/mL   T4, Free   Result Value Ref Range    Free T4 1.45 0.93 - 1.70 ng/dL   TSH   Result Value Ref Range    TSH 1.740 0.270 - 4.200 uIU/mL   will cont B12 inj and labs March  Still recommend seeing neurologist    Cindy Hawk female 44 y.o., /72 (BP Location: Left arm, Patient Position: Sitting, Cuff Size: Large Adult)   Pulse 68   Temp 98.1 °F (36.7 °C) (Oral)   Resp 16   Ht 170.2 cm (67\")   Wt 83 kg (183 lb)   LMP 12/05/2019   SpO2 100%   BMI 28.66 kg/m²   who presents today for follow up of Anxiety.  She reports medication is working well, patient desires to continue on Rx, and needs refill. Onset of symptoms was approximately several years ago.  She denies current suicidal and homicidal ideation. Risk factors are family history of anxiety and or depression and lifestyle of multiple roles.  Previous treatment includes current Rx.  She complains of the following medication side effects: none.  The patient declines to go to counseling..    She is still having h/a's and blurry vision  Many of her neuro C/o " and pain --along with h/a's can also be Chiari related.  Already had one surgery. Need to consider further eval.  The patient has read and signed the [facility] Controlled Substance Contract.  I will continue to see patient for regular follow up appointments.  They are well controlled on their medication.  LISA has been reviewed by me and is updated every 3 months. The patient is aware of the potential for addiction and dependence.        The following portions of the patient's history were reviewed and updated as appropriate: allergies, current medications, past family history, past medical history, past social history, past surgical history and problem list.    Review of Systems   Constitutional: Positive for fatigue. Negative for activity change, appetite change and unexpected weight change.   HENT: Negative for nosebleeds and trouble swallowing.    Eyes: Negative for pain and visual disturbance.   Respiratory: Negative for chest tightness, shortness of breath and wheezing.    Cardiovascular: Negative for chest pain and palpitations.   Gastrointestinal: Negative for abdominal pain and blood in stool.   Endocrine: Negative.    Genitourinary: Negative for difficulty urinating and hematuria.   Musculoskeletal: Positive for back pain, neck pain and neck stiffness. Negative for joint swelling.   Skin: Negative for color change and rash.   Allergic/Immunologic: Negative.    Neurological: Positive for dizziness, weakness, light-headedness, numbness and headaches. Negative for syncope and speech difficulty.   Hematological: Negative for adenopathy.   Psychiatric/Behavioral: Negative for agitation and confusion.   All other systems reviewed and are negative.      Objective   Physical Exam   Constitutional: She is oriented to person, place, and time. She appears well-developed and well-nourished. No distress.   HENT:   Head: Normocephalic and atraumatic.   Eyes: Pupils are equal, round, and reactive to light.  Conjunctivae and EOM are normal. Right eye exhibits no discharge. Left eye exhibits no discharge. No scleral icterus.   Neck: Normal range of motion. Neck supple. No tracheal deviation present. No thyromegaly present.   Cardiovascular: Normal rate, regular rhythm, normal heart sounds, intact distal pulses and normal pulses. Exam reveals no gallop.   No murmur heard.  Pulmonary/Chest: Effort normal and breath sounds normal. No respiratory distress. She has no wheezes. She has no rales.   Musculoskeletal: Normal range of motion.   Neurological: She is alert and oriented to person, place, and time. She exhibits normal muscle tone. Coordination normal.   Skin: Skin is warm. No rash noted. No erythema. No pallor.   Psychiatric: She has a normal mood and affect. Her behavior is normal. Judgment and thought content normal.   Nursing note and vitals reviewed.      Assessment/Plan   Problems Addressed this Visit        Cardiovascular and Mediastinum    Hyperlipidemia       Digestive    Vitamin B12 deficiency    Vitamin D deficiency disease       Endocrine    Acquired hypothyroidism       Nervous and Auditory    Arnold-Chiari malformation, type I (CMS/HCC)    Neck pain    Peripheral neuropathy - Primary    Other headache syndrome       Other    Anxiety      Other Visit Diagnoses     Laboratory examination ordered as part of a routine general medical examination        Relevant Orders    Comprehensive metabolic panel    Lipid panel    CBC and Differential    TSH    T3, Free    T4, Free    Vitamin B12    Folate    Vitamin D 25 Hydroxy      refill Ultram and helps pain also  Still needs to consider seeing Neurosurgeon  Cont B12  Labs in March  Jaret, Cindy Hawk, was seen today.  she was seen for Hyperlipidemia and will work on this with diet and exercise, Hypothyroidism well controlled, continue medication and Vitamin D deficiency and will update labs .

## 2019-12-10 NOTE — PATIENT INSTRUCTIONS

## 2020-01-08 ENCOUNTER — APPOINTMENT (OUTPATIENT)
Dept: GENERAL RADIOLOGY | Facility: HOSPITAL | Age: 45
End: 2020-01-08

## 2020-01-08 ENCOUNTER — HOSPITAL ENCOUNTER (EMERGENCY)
Facility: HOSPITAL | Age: 45
Discharge: HOME | End: 2020-01-08
Attending: EMERGENCY MEDICINE | Admitting: EMERGENCY MEDICINE

## 2020-01-08 VITALS
DIASTOLIC BLOOD PRESSURE: 70 MMHG | HEART RATE: 70 BPM | RESPIRATION RATE: 17 BRPM | OXYGEN SATURATION: 99 % | TEMPERATURE: 98.4 F | HEIGHT: 67 IN | SYSTOLIC BLOOD PRESSURE: 110 MMHG | BODY MASS INDEX: 28.66 KG/M2

## 2020-01-08 DIAGNOSIS — M54.9 UPPER BACK PAIN: Primary | ICD-10-CM

## 2020-01-08 LAB
ALBUMIN SERPL-MCNC: 4.2 G/DL (ref 3.5–5.2)
ALBUMIN/GLOB SERPL: 1.4 G/DL
ALP SERPL-CCNC: 72 U/L (ref 39–117)
ALT SERPL W P-5'-P-CCNC: 20 U/L (ref 1–33)
ANION GAP SERPL CALCULATED.3IONS-SCNC: 9.7 MMOL/L (ref 5–15)
AST SERPL-CCNC: 17 U/L (ref 1–32)
BASOPHILS # BLD AUTO: 0.07 10*3/MM3 (ref 0–0.2)
BASOPHILS NFR BLD AUTO: 0.7 % (ref 0–1.5)
BILIRUB SERPL-MCNC: 0.2 MG/DL (ref 0.2–1.2)
BUN BLD-MCNC: 16 MG/DL (ref 6–20)
BUN/CREAT SERPL: 16.5 (ref 7–25)
CALCIUM SPEC-SCNC: 8.8 MG/DL (ref 8.6–10.5)
CHLORIDE SERPL-SCNC: 103 MMOL/L (ref 98–107)
CO2 SERPL-SCNC: 25.3 MMOL/L (ref 22–29)
CREAT BLD-MCNC: 0.97 MG/DL (ref 0.57–1)
D DIMER PPP FEU-MCNC: 0.49 MCGFEU/ML (ref 0–0.49)
DEPRECATED RDW RBC AUTO: 42.5 FL (ref 37–54)
EOSINOPHIL # BLD AUTO: 0.3 10*3/MM3 (ref 0–0.4)
EOSINOPHIL NFR BLD AUTO: 3 % (ref 0.3–6.2)
ERYTHROCYTE [DISTWIDTH] IN BLOOD BY AUTOMATED COUNT: 12.3 % (ref 12.3–15.4)
GFR SERPL CREATININE-BSD FRML MDRD: 63 ML/MIN/1.73
GLOBULIN UR ELPH-MCNC: 3 GM/DL
GLUCOSE BLD-MCNC: 136 MG/DL (ref 65–99)
HCT VFR BLD AUTO: 40.7 % (ref 34–46.6)
HGB BLD-MCNC: 13.4 G/DL (ref 12–15.9)
IMM GRANULOCYTES # BLD AUTO: 0.02 10*3/MM3 (ref 0–0.05)
IMM GRANULOCYTES NFR BLD AUTO: 0.2 % (ref 0–0.5)
LIPASE SERPL-CCNC: 34 U/L (ref 13–60)
LYMPHOCYTES # BLD AUTO: 3.1 10*3/MM3 (ref 0.7–3.1)
LYMPHOCYTES NFR BLD AUTO: 30.8 % (ref 19.6–45.3)
MCH RBC QN AUTO: 30.7 PG (ref 26.6–33)
MCHC RBC AUTO-ENTMCNC: 32.9 G/DL (ref 31.5–35.7)
MCV RBC AUTO: 93.1 FL (ref 79–97)
MONOCYTES # BLD AUTO: 0.56 10*3/MM3 (ref 0.1–0.9)
MONOCYTES NFR BLD AUTO: 5.6 % (ref 5–12)
NEUTROPHILS # BLD AUTO: 6.01 10*3/MM3 (ref 1.7–7)
NEUTROPHILS NFR BLD AUTO: 59.7 % (ref 42.7–76)
NRBC BLD AUTO-RTO: 0.1 /100 WBC (ref 0–0.2)
PLATELET # BLD AUTO: 311 10*3/MM3 (ref 140–450)
PMV BLD AUTO: 9.1 FL (ref 6–12)
POTASSIUM BLD-SCNC: 4.5 MMOL/L (ref 3.5–5.2)
PROT SERPL-MCNC: 7.2 G/DL (ref 6–8.5)
RBC # BLD AUTO: 4.37 10*6/MM3 (ref 3.77–5.28)
SODIUM BLD-SCNC: 138 MMOL/L (ref 136–145)
TROPONIN T SERPL-MCNC: <0.01 NG/ML (ref 0–0.03)
WBC NRBC COR # BLD: 10.06 10*3/MM3 (ref 3.4–10.8)

## 2020-01-08 PROCEDURE — 85379 FIBRIN DEGRADATION QUANT: CPT | Performed by: EMERGENCY MEDICINE

## 2020-01-08 PROCEDURE — 99283 EMERGENCY DEPT VISIT LOW MDM: CPT

## 2020-01-08 PROCEDURE — 93010 ELECTROCARDIOGRAM REPORT: CPT | Performed by: INTERNAL MEDICINE

## 2020-01-08 PROCEDURE — 85025 COMPLETE CBC W/AUTO DIFF WBC: CPT | Performed by: EMERGENCY MEDICINE

## 2020-01-08 PROCEDURE — 80053 COMPREHEN METABOLIC PANEL: CPT | Performed by: EMERGENCY MEDICINE

## 2020-01-08 PROCEDURE — 84484 ASSAY OF TROPONIN QUANT: CPT | Performed by: EMERGENCY MEDICINE

## 2020-01-08 PROCEDURE — 83690 ASSAY OF LIPASE: CPT | Performed by: EMERGENCY MEDICINE

## 2020-01-08 PROCEDURE — 93005 ELECTROCARDIOGRAM TRACING: CPT | Performed by: EMERGENCY MEDICINE

## 2020-01-08 PROCEDURE — 71046 X-RAY EXAM CHEST 2 VIEWS: CPT

## 2020-01-08 NOTE — ED PROVIDER NOTES
EMERGENCY DEPARTMENT ENCOUNTER    Room Number:  01/01  Date of encounter:  1/8/2020  PCP: Lyly Back, MARIO  Historian: Patient       HPI:  Chief Complaint: Upper back pain  A complete HPI/ROS/PMH/PSH/SH/FH are unobtainable due to: None    Context: Cindy Hawk is a 44 y.o. female who presents to the ED c/o upper back pain which began yesterday.  Pain is in the bilateral paraspinous areas of the bilateral upper back.  The pain is dull at rest and is worsened by movements, deep breath or cough.  Pain becomes sharp with movement deep breath or cough.  Pain is moderate at rest and become severe at times.  Patient took muscle relaxants which did not help.  Patient denies any anterior chest pain or shortness of breath.      PAST MEDICAL HISTORY  Active Ambulatory Problems     Diagnosis Date Noted   • Vitamin B12 deficiency 11/21/2015   • Vitamin D deficiency disease 11/21/2015   • Acquired hypothyroidism 11/21/2015   • Arnold-Chiari malformation, type I (CMS/HCC) 11/21/2015   • Neck pain 11/21/2015   • Peripheral neuropathy 11/21/2015   • Headache 11/21/2015   • Anxiety 11/21/2015   • Hyperlipidemia 11/21/2015   • Arthritis 11/21/2015   • History of Chiari malformation 03/13/2019   • Other headache syndrome 03/13/2019   • Chronic bilateral low back pain with left-sided sciatica 03/13/2019     Resolved Ambulatory Problems     Diagnosis Date Noted   • No Resolved Ambulatory Problems     Past Medical History:   Diagnosis Date   • Chiari I malformation (CMS/HCC)    • Disease of thyroid gland    • Insomnia          PAST SURGICAL HISTORY  Past Surgical History:   Procedure Laterality Date   • BRAIN SURGERY  2207 12-07 DR [place]   • BREAST SURGERY  10/30/2013    left abscess I & D   • KNEE SURGERY           FAMILY HISTORY  Family History   Problem Relation Age of Onset   • COPD Mother    • Emphysema Mother    • Pulmonary embolism Father    • Alcohol abuse Other    • Diabetes Other    • Cancer Other    • Kidney  disease Other          SOCIAL HISTORY  Social History     Socioeconomic History   • Marital status:      Spouse name: Not on file   • Number of children: 3   • Years of education: HIGHSCHOOL   • Highest education level: Not on file   Occupational History   • Occupation: UNEMPLOYED   Tobacco Use   • Smoking status: Current Every Day Smoker     Packs/day: 0.50     Last attempt to quit: 1/23/2016     Years since quitting: 3.9   • Smokeless tobacco: Never Used   Substance and Sexual Activity   • Alcohol use: No   • Drug use: No   • Sexual activity: Defer         ALLERGIES  Patient has no known allergies.       REVIEW OF SYSTEMS  Review of Systems   Constitutional: Negative.  Negative for fever.   HENT: Negative for sore throat.    Eyes: Negative.    Respiratory: Negative.  Negative for cough and shortness of breath.    Cardiovascular: Negative.  Negative for chest pain and leg swelling.   Gastrointestinal: Positive for nausea and vomiting (Yesterday, better today).   Genitourinary: Negative.  Negative for dysuria.        LMP 1/4   Musculoskeletal: Positive for back pain (Upper back pain or as per HPI).   Skin: Negative.  Negative for rash.   Neurological: Positive for headaches (Chronic, unchanged).   All other systems reviewed and are negative.          PHYSICAL EXAM    I have reviewed the triage vital signs and nursing notes.    ED Triage Vitals [01/08/20 1618]   Temp Heart Rate Resp BP SpO2   -- 89 15 -- 99 %      Temp src Heart Rate Source Patient Position BP Location FiO2 (%)   -- -- -- -- --       Physical Exam  GENERAL: not distressed, well-appearing  HENT: nares patent  EYES: no scleral icterus  CV: regular rhythm, regular rate  RESPIRATORY: normal effort, unlabored.  Saturations 98% on room air  ABDOMEN: soft, nontender nondistended  MUSCULOSKELETAL: There is no tenderness to palpation along the spinous processes.  Range of motion is mildly decreased secondary to pain.  NEURO: Strength, sensation, and  coordination are grossly intact.  Speech and mentation are unremarkable  SKIN: warm, dry      LAB RESULTS  Recent Results (from the past 24 hour(s))   Comprehensive Metabolic Panel    Collection Time: 01/08/20  4:46 PM   Result Value Ref Range    Glucose 136 (H) 65 - 99 mg/dL    BUN 16 6 - 20 mg/dL    Creatinine 0.97 0.57 - 1.00 mg/dL    Sodium 138 136 - 145 mmol/L    Potassium 4.5 3.5 - 5.2 mmol/L    Chloride 103 98 - 107 mmol/L    CO2 25.3 22.0 - 29.0 mmol/L    Calcium 8.8 8.6 - 10.5 mg/dL    Total Protein 7.2 6.0 - 8.5 g/dL    Albumin 4.20 3.50 - 5.20 g/dL    ALT (SGPT) 20 1 - 33 U/L    AST (SGOT) 17 1 - 32 U/L    Alkaline Phosphatase 72 39 - 117 U/L    Total Bilirubin 0.2 0.2 - 1.2 mg/dL    eGFR Non African Amer 63 >60 mL/min/1.73    Globulin 3.0 gm/dL    A/G Ratio 1.4 g/dL    BUN/Creatinine Ratio 16.5 7.0 - 25.0    Anion Gap 9.7 5.0 - 15.0 mmol/L   Lipase    Collection Time: 01/08/20  4:46 PM   Result Value Ref Range    Lipase 34 13 - 60 U/L   D-dimer, Quantitative    Collection Time: 01/08/20  4:46 PM   Result Value Ref Range    D-Dimer, Quantitative 0.49 0.00 - 0.49 MCGFEU/mL   Troponin    Collection Time: 01/08/20  4:46 PM   Result Value Ref Range    Troponin T <0.010 0.000 - 0.030 ng/mL   CBC Auto Differential    Collection Time: 01/08/20  4:46 PM   Result Value Ref Range    WBC 10.06 3.40 - 10.80 10*3/mm3    RBC 4.37 3.77 - 5.28 10*6/mm3    Hemoglobin 13.4 12.0 - 15.9 g/dL    Hematocrit 40.7 34.0 - 46.6 %    MCV 93.1 79.0 - 97.0 fL    MCH 30.7 26.6 - 33.0 pg    MCHC 32.9 31.5 - 35.7 g/dL    RDW 12.3 12.3 - 15.4 %    RDW-SD 42.5 37.0 - 54.0 fl    MPV 9.1 6.0 - 12.0 fL    Platelets 311 140 - 450 10*3/mm3    Neutrophil % 59.7 42.7 - 76.0 %    Lymphocyte % 30.8 19.6 - 45.3 %    Monocyte % 5.6 5.0 - 12.0 %    Eosinophil % 3.0 0.3 - 6.2 %    Basophil % 0.7 0.0 - 1.5 %    Immature Grans % 0.2 0.0 - 0.5 %    Neutrophils, Absolute 6.01 1.70 - 7.00 10*3/mm3    Lymphocytes, Absolute 3.10 0.70 - 3.10 10*3/mm3     Monocytes, Absolute 0.56 0.10 - 0.90 10*3/mm3    Eosinophils, Absolute 0.30 0.00 - 0.40 10*3/mm3    Basophils, Absolute 0.07 0.00 - 0.20 10*3/mm3    Immature Grans, Absolute 0.02 0.00 - 0.05 10*3/mm3    nRBC 0.1 0.0 - 0.2 /100 WBC       Ordered the above labs and independently reviewed the results.      RADIOLOGY  Xr Chest 2 View    Result Date: 1/8/2020  PA AND LATERAL CHEST  HISTORY: 44-year-old with upper back pain with exhaling.  COMPARISON: None.  FINDINGS: Cardiomediastinal silhouette is normal. Lungs appear clear. There is no evidence for pulmonary edema or pleural effusion or infiltrate.      Negative chest.        I ordered the above noted radiological studies. Reviewed by me and discussed with radiologist.  See dictation for official radiology interpretation.      PROCEDURES  Procedures      MEDICATIONS GIVEN IN ER    Medications - No data to display      PROGRESS, DATA ANALYSIS, CONSULTS, AND MEDICAL DECISION MAKING    All labs have been independently reviewed by me.  All radiology studies have been reviewed by me and discussed with radiologist dictating the report.   EKG's independently viewed and interpreted by me.  Discussion below represents my analysis of pertinent findings related to patient's condition, differential diagnosis, treatment plan and final disposition.      ED Course as of Jan 08 1750   Wed Jan 08, 2020   1631 Initial impression-symptoms are consistent with likely pleurisy.  I do not see red flags to suggest that this is likely acute coronary syndrome, dissection or pulmonary embolism but will go ahead and check chest x-ray, labs including d-dimer and EKG.    [DB]   1747 EKG          EKG time: 1745  Rhythm/Rate: Sinus 60  P waves and LA: Normal P waves and LA intervals  QRS, axis: Normal axis, normal QRS  ST and T waves: Normal ST and T wave    Interpreted Contemporaneously by me, independently viewed  No prior to compare      [DB]   1750 Discussed results of the testing with patient  family at the bedside.  EKG, chest x-ray and labs including troponin and d-dimer are negative.  I see no evidence of serious pathology to include aortic dissection, pneumothorax and pulmonary embolism or acute coronary syndrome.  I do suspect that this is benign upper back pain, possibly pleurisy.    [DB]      ED Course User Index  [DB] Ramon Irvin MD       AS OF 5:49 PM VITALS:    BP - 114/71  HR - 69  TEMP - 98.4 °F (36.9 °C) (Oral)  O2 SATS - 97%      DIAGNOSIS  Final diagnoses:   Upper back pain         DISPOSITION  DISCHARGE    Patient discharged in stable condition.    Reviewed implications of results, diagnosis, meds, responsibility to follow up, warning signs and symptoms of possible worsening, potential complications and reasons to return to ER, including increased chest pain, shortness of breath or as needed.    Patient/Family voiced understanding of above instructions.    Discussed plan for discharge, as there is no emergent indication for admission. Patient referred to primary care provider for BP management due to today's BP. Pt/family is agreeable and understands need for follow up and repeat testing.  Pt is aware that discharge does not mean that nothing is wrong but it indicates no emergency is present that requires admission and they must continue care with follow-up as given below or physician of their choice.     FOLLOW-UP  Lyly Back, PAMarisabelC  [address] [facility].[address] [place], [place] [id number] [phone]    In 1 week  If Not Better         Medication List      No changes were made to your prescriptions during this visit.                Ramon Irvin MD  01/08/20 5425

## 2020-01-08 NOTE — ED TRIAGE NOTES
Patient presents to er via private vehicle from home.  Patient is reporting mid back pain with exhalation and dyspnea with exertion.  Patient describes pain as being dull and sharp with cough and expiration.

## 2020-01-09 RX ORDER — METHYLPREDNISOLONE 4 MG/1
TABLET ORAL
Qty: 21 TABLET | Refills: 0 | Status: SHIPPED | OUTPATIENT
Start: 2020-01-09 | End: 2020-03-10

## 2020-03-01 ENCOUNTER — RESULTS ENCOUNTER (OUTPATIENT)
Dept: FAMILY MEDICINE CLINIC | Facility: CLINIC | Age: 45
End: 2020-03-01

## 2020-03-01 DIAGNOSIS — Z00.00 LABORATORY EXAMINATION ORDERED AS PART OF A ROUTINE GENERAL MEDICAL EXAMINATION: ICD-10-CM

## 2020-03-10 ENCOUNTER — OFFICE VISIT (OUTPATIENT)
Dept: FAMILY MEDICINE CLINIC | Facility: CLINIC | Age: 45
End: 2020-03-10

## 2020-03-10 VITALS
WEIGHT: 188 LBS | OXYGEN SATURATION: 99 % | DIASTOLIC BLOOD PRESSURE: 80 MMHG | TEMPERATURE: 98 F | BODY MASS INDEX: 29.51 KG/M2 | RESPIRATION RATE: 16 BRPM | HEIGHT: 67 IN | HEART RATE: 84 BPM | SYSTOLIC BLOOD PRESSURE: 120 MMHG

## 2020-03-10 DIAGNOSIS — E78.2 MIXED HYPERLIPIDEMIA: ICD-10-CM

## 2020-03-10 DIAGNOSIS — J01.90 ACUTE SINUSITIS, RECURRENCE NOT SPECIFIED, UNSPECIFIED LOCATION: ICD-10-CM

## 2020-03-10 DIAGNOSIS — E55.9 VITAMIN D DEFICIENCY DISEASE: ICD-10-CM

## 2020-03-10 DIAGNOSIS — G62.9 PERIPHERAL POLYNEUROPATHY: ICD-10-CM

## 2020-03-10 DIAGNOSIS — E53.8 VITAMIN B12 DEFICIENCY: ICD-10-CM

## 2020-03-10 DIAGNOSIS — G44.229 CHRONIC TENSION-TYPE HEADACHE, NOT INTRACTABLE: Primary | ICD-10-CM

## 2020-03-10 DIAGNOSIS — F41.9 ANXIETY: ICD-10-CM

## 2020-03-10 DIAGNOSIS — J20.9 ACUTE BRONCHITIS DUE TO INFECTION: ICD-10-CM

## 2020-03-10 PROCEDURE — 99214 OFFICE O/P EST MOD 30 MIN: CPT | Performed by: PHYSICIAN ASSISTANT

## 2020-03-10 PROCEDURE — 96372 THER/PROPH/DIAG INJ SC/IM: CPT | Performed by: PHYSICIAN ASSISTANT

## 2020-03-10 RX ORDER — METHYLPREDNISOLONE 4 MG/1
TABLET ORAL
Qty: 21 TABLET | Refills: 1 | Status: SHIPPED | OUTPATIENT
Start: 2020-03-10 | End: 2020-03-30

## 2020-03-10 RX ORDER — ALBUTEROL SULFATE 90 UG/1
2 AEROSOL, METERED RESPIRATORY (INHALATION) EVERY 4 HOURS PRN
Qty: 1 INHALER | Refills: 11 | Status: SHIPPED | OUTPATIENT
Start: 2020-03-10 | End: 2020-06-10

## 2020-03-10 RX ORDER — AMOXICILLIN AND CLAVULANATE POTASSIUM 875; 125 MG/1; MG/1
1 TABLET, FILM COATED ORAL EVERY 12 HOURS SCHEDULED
Qty: 20 TABLET | Refills: 1 | Status: SHIPPED | OUTPATIENT
Start: 2020-03-10 | End: 2020-03-30

## 2020-03-10 RX ORDER — TRAMADOL HYDROCHLORIDE 50 MG/1
50 TABLET ORAL EVERY 6 HOURS PRN
Qty: 90 TABLET | Refills: 2 | Status: SHIPPED | OUTPATIENT
Start: 2020-03-10 | End: 2020-06-10 | Stop reason: SDUPTHER

## 2020-03-10 RX ORDER — LEVOTHYROXINE SODIUM 112 UG/1
112 TABLET ORAL DAILY
Qty: 90 TABLET | Refills: 3 | Status: SHIPPED | OUTPATIENT
Start: 2020-03-10 | End: 2020-12-11

## 2020-03-10 RX ADMIN — CYANOCOBALAMIN 1000 MCG: 1000 INJECTION, SOLUTION INTRAMUSCULAR; SUBCUTANEOUS at 15:58

## 2020-03-10 NOTE — PROGRESS NOTES
"Subjective   Cindy Hawk is a 44 y.o. female.     History of Present Illness    Since the last visit, she has overall felt tired.  She has Hyperlipidemia and working on this with diet and exercise, Hypothyroidism and must update labs to continue treatment and Vitamin D deficiency and will update labs for continued management.  she has been compliant with current medications have reviewed them.  The patient denies medication side effects.  Will refill medications. /80 (BP Location: Left arm, Patient Position: Sitting, Cuff Size: Large Adult)   Pulse 84   Temp 98 °F (36.7 °C) (Oral)   Resp 16   Ht 170.2 cm (67\")   Wt 85.3 kg (188 lb)   LMP 03/04/2020   SpO2 99%   BMI 29.44 kg/m²   Cindy Hawk 44 y.o. female who presents for evaluation of sinus pressure and congestion, acute bronchitis, wheezing. Symptoms include ear pressure, congestion, sneezing, nasal blockage, post nasal drip, cough described as productive of yellow sputum, shortness of breath, wheezing, fatigue and deep bronchial sounding cough.  Onset of symptoms was 10 days ago, gradually worsening since that time. Patient denies fever.   Evaluation to date: none Treatment to date:  OTC oral decongestants.     Yellow pnd  Results for orders placed or performed during the hospital encounter of 01/08/20   Comprehensive Metabolic Panel   Result Value Ref Range    Glucose 136 (H) 65 - 99 mg/dL    BUN 16 6 - 20 mg/dL    Creatinine 0.97 0.57 - 1.00 mg/dL    Sodium 138 136 - 145 mmol/L    Potassium 4.5 3.5 - 5.2 mmol/L    Chloride 103 98 - 107 mmol/L    CO2 25.3 22.0 - 29.0 mmol/L    Calcium 8.8 8.6 - 10.5 mg/dL    Total Protein 7.2 6.0 - 8.5 g/dL    Albumin 4.20 3.50 - 5.20 g/dL    ALT (SGPT) 20 1 - 33 U/L    AST (SGOT) 17 1 - 32 U/L    Alkaline Phosphatase 72 39 - 117 U/L    Total Bilirubin 0.2 0.2 - 1.2 mg/dL    eGFR Non African Amer 63 >60 mL/min/1.73    Globulin 3.0 gm/dL    A/G Ratio 1.4 g/dL    BUN/Creatinine Ratio 16.5 7.0 - 25.0 "    Anion Gap 9.7 5.0 - 15.0 mmol/L   Lipase   Result Value Ref Range    Lipase 34 13 - 60 U/L   D-dimer, Quantitative   Result Value Ref Range    D-Dimer, Quantitative 0.49 0.00 - 0.49 MCGFEU/mL   Troponin   Result Value Ref Range    Troponin T <0.010 0.000 - 0.030 ng/mL   CBC Auto Differential   Result Value Ref Range    WBC 10.06 3.40 - 10.80 10*3/mm3    RBC 4.37 3.77 - 5.28 10*6/mm3    Hemoglobin 13.4 12.0 - 15.9 g/dL    Hematocrit 40.7 34.0 - 46.6 %    MCV 93.1 79.0 - 97.0 fL    MCH 30.7 26.6 - 33.0 pg    MCHC 32.9 31.5 - 35.7 g/dL    RDW 12.3 12.3 - 15.4 %    RDW-SD 42.5 37.0 - 54.0 fl    MPV 9.1 6.0 - 12.0 fL    Platelets 311 140 - 450 10*3/mm3    Neutrophil % 59.7 42.7 - 76.0 %    Lymphocyte % 30.8 19.6 - 45.3 %    Monocyte % 5.6 5.0 - 12.0 %    Eosinophil % 3.0 0.3 - 6.2 %    Basophil % 0.7 0.0 - 1.5 %    Immature Grans % 0.2 0.0 - 0.5 %    Neutrophils, Absolute 6.01 1.70 - 7.00 10*3/mm3    Lymphocytes, Absolute 3.10 0.70 - 3.10 10*3/mm3    Monocytes, Absolute 0.56 0.10 - 0.90 10*3/mm3    Eosinophils, Absolute 0.30 0.00 - 0.40 10*3/mm3    Basophils, Absolute 0.07 0.00 - 0.20 10*3/mm3    Immature Grans, Absolute 0.02 0.00 - 0.05 10*3/mm3    nRBC 0.1 0.0 - 0.2 /100 WBC         She is still having headaches and blurry vision.  I am concerned about this in her peripheral neuropathy along with her history of Chiari malformation.  I would like her to see a neurologist and we talked about this several times.  She is on the tramadol for the chronic headaches. The patient has read and signed the [facility] Controlled Substance Contract.  I will continue to see patient for regular follow up appointments.  They are well controlled on their medication.  LISA has been reviewed by me and is updated every 3 months. The patient is aware of the potential for addiction and dependence.  She continues to take Zoloft for the anxiety and this does work well.  We went up on dose last visit and this is helped tremendously.  I  also have her on B12 for vitamin B12 deficiency.  I have several labs that I ordered at the end of February that need to be completed.  The following portions of the patient's history were reviewed and updated as appropriate: allergies, current medications, past family history, past medical history, past social history, past surgical history and problem list.    Review of Systems   Constitutional: Positive for fatigue and unexpected weight change. Negative for activity change and appetite change.   HENT: Positive for postnasal drip, rhinorrhea, sinus pressure, sinus pain, sneezing and tinnitus. Negative for nosebleeds and trouble swallowing.    Eyes: Positive for visual disturbance. Negative for pain.   Respiratory: Positive for cough. Negative for chest tightness, shortness of breath and wheezing.    Cardiovascular: Negative for chest pain and palpitations.   Gastrointestinal: Negative for abdominal pain and blood in stool.   Endocrine: Negative.    Genitourinary: Negative for difficulty urinating and hematuria.   Musculoskeletal: Positive for arthralgias, back pain, myalgias, neck pain and neck stiffness. Negative for joint swelling.   Skin: Negative for color change and rash.   Allergic/Immunologic: Positive for environmental allergies.   Neurological: Positive for dizziness, tremors, weakness, light-headedness, numbness and headaches. Negative for syncope and speech difficulty.   Hematological: Negative for adenopathy.   Psychiatric/Behavioral: Negative for agitation and confusion. The patient is nervous/anxious.    All other systems reviewed and are negative.      Objective   Physical Exam   Constitutional: She is oriented to person, place, and time. She appears well-developed and well-nourished.  Non-toxic appearance. No distress.   HENT:   Head: Normocephalic and atraumatic. Hair is normal.   Right Ear: External ear normal. No drainage, swelling or tenderness. Tympanic membrane is retracted.   Left Ear:  External ear normal. No drainage, swelling or tenderness. Tympanic membrane is retracted.   Nose: Mucosal edema present. No epistaxis.   Mouth/Throat: Uvula is midline and mucous membranes are normal. No oral lesions. No uvula swelling. Posterior oropharyngeal erythema present. No oropharyngeal exudate.   Eyes: Pupils are equal, round, and reactive to light. Conjunctivae and EOM are normal. Right eye exhibits no discharge. Left eye exhibits no discharge. No scleral icterus.   Neck: Normal range of motion. Neck supple.   Cardiovascular: Normal rate, regular rhythm and normal heart sounds. Exam reveals no gallop.   No murmur heard.  Pulmonary/Chest: Breath sounds normal. No stridor. No respiratory distress. She has no wheezes. She has no rales. She exhibits no tenderness.   Abdominal: Soft. There is no tenderness.   Lymphadenopathy:     She has cervical adenopathy.   Neurological: She is alert and oriented to person, place, and time. She exhibits normal muscle tone.   Skin: Skin is warm and dry. No rash noted. She is not diaphoretic.   Psychiatric: She has a normal mood and affect. Her behavior is normal. Judgment and thought content normal.   Nursing note and vitals reviewed.      Assessment/Plan   Problems Addressed this Visit        Cardiovascular and Mediastinum    Hyperlipidemia       Digestive    Vitamin B12 deficiency    Vitamin D deficiency disease       Nervous and Auditory    Peripheral neuropathy    Headache - Primary       Other    Anxiety      Other Visit Diagnoses     Acute sinusitis, recurrence not specified, unspecified location        Acute bronchitis due to infection        Relevant Medications    amoxicillin-clavulanate (AUGMENTIN) 875-125 MG per tablet    albuterol sulfate  (90 Base) MCG/ACT inhaler        Plan, Cindy Hawk, was seen today.  she was seen for Hyperlipidemia and will work on this with diet and exercise, Hypothyroidism and will need to update labs for continued  treatment and Vitamin D deficiency and will update labs .  Still advising that she needs to see neurology for her chronic daily headaches and polyneuropathy  Continue B12  Continue Zoloft for anxiety working well  Labs ordered  Stop smoking  Treat sinus infection and acute bronchitis with Augmentin and Medrol Dosepak.  Also as needed albuterol MDI.

## 2020-03-10 NOTE — PATIENT INSTRUCTIONS
Acute Bronchitis, Adult    Acute bronchitis is sudden (acute) swelling of the air tubes (bronchi) in the lungs. Acute bronchitis causes these tubes to fill with mucus, which can make it hard to breathe. It can also cause coughing or wheezing.  In adults, acute bronchitis usually goes away within 2 weeks. A cough caused by bronchitis may last up to 3 weeks. Smoking, allergies, and asthma can make the condition worse. Repeated episodes of bronchitis may cause further lung problems, such as chronic obstructive pulmonary disease (COPD).  What are the causes?  This condition can be caused by germs and by substances that irritate the lungs, including:  · Cold and flu viruses. This condition is most often caused by the same virus that causes a cold.  · Bacteria.  · Exposure to tobacco smoke, dust, fumes, and air pollution.  What increases the risk?  This condition is more likely to develop in people who:  · Have close contact with someone with acute bronchitis.  · Are exposed to lung irritants, such as tobacco smoke, dust, fumes, and vapors.  · Have a weak immune system.  · Have a respiratory condition such as asthma.  What are the signs or symptoms?  Symptoms of this condition include:  · A cough.  · Coughing up clear, yellow, or green mucus.  · Wheezing.  · Chest congestion.  · Shortness of breath.  · A fever.  · Body aches.  · Chills.  · A sore throat.  How is this diagnosed?  This condition is usually diagnosed with a physical exam. During the exam, your health care provider may order tests, such as chest X-rays, to rule out other conditions. He or she may also:  · Test a sample of your mucus for bacterial infection.  · Check the level of oxygen in your blood. This is done to check for pneumonia.  · Do a chest X-ray or lung function testing to rule out pneumonia and other conditions.  · Perform blood tests.  Your health care provider will also ask about your symptoms and medical history.  How is this treated?  Most  cases of acute bronchitis clear up over time without treatment. Your health care provider may recommend:  · Drinking more fluids. Drinking more makes your mucus thinner, which may make it easier to breathe.  · Taking a medicine for a fever or cough.  · Taking an antibiotic medicine.  · Using an inhaler to help improve shortness of breath and to control a cough.  · Using a cool mist vaporizer or humidifier to make it easier to breathe.  Follow these instructions at home:  Medicines  · Take over-the-counter and prescription medicines only as told by your health care provider.  · If you were prescribed an antibiotic, take it as told by your health care provider. Do not stop taking the antibiotic even if you start to feel better.  General instructions    · Get plenty of rest.  · Drink enough fluids to keep your urine pale yellow.  · Avoid smoking and secondhand smoke. Exposure to cigarette smoke or irritating chemicals will make bronchitis worse. If you smoke and you need help quitting, ask your health care provider. Quitting smoking will help your lungs heal faster.  · Use an inhaler, cool mist vaporizer, or humidifier as told by your health care provider.  · Keep all follow-up visits as told by your health care provider. This is important.  How is this prevented?  To lower your risk of getting this condition again:  · Wash your hands often with soap and water. If soap and water are not available, use hand .  · Avoid contact with people who have cold symptoms.  · Try not to touch your hands to your mouth, nose, or eyes.  · Make sure to get the flu shot every year.  Contact a health care provider if:  · Your symptoms do not improve in 2 weeks of treatment.  Get help right away if:  · You cough up blood.  · You have chest pain.  · You have severe shortness of breath.  · You become dehydrated.  · You faint or keep feeling like you are going to faint.  · You keep vomiting.  · You have a severe headache.  · Your  fever or chills gets worse.  This information is not intended to replace advice given to you by your health care provider. Make sure you discuss any questions you have with your health care provider.  Document Released: 01/25/2006 Document Revised: 10/31/2019 Document Reviewed: 06/07/2017  [employer] Interactive Patient Education © 2020 [employer]

## 2020-03-30 ENCOUNTER — OFFICE VISIT (OUTPATIENT)
Dept: FAMILY MEDICINE CLINIC | Facility: CLINIC | Age: 45
End: 2020-03-30

## 2020-03-30 VITALS — BODY MASS INDEX: 29.44 KG/M2 | RESPIRATION RATE: 16 BRPM | HEIGHT: 67 IN

## 2020-03-30 DIAGNOSIS — M54.40 BACK PAIN OF LUMBAR REGION WITH SCIATICA: Primary | ICD-10-CM

## 2020-03-30 PROCEDURE — 99213 OFFICE O/P EST LOW 20 MIN: CPT | Performed by: PHYSICIAN ASSISTANT

## 2020-03-30 RX ORDER — PREDNISONE 20 MG/1
TABLET ORAL
Qty: 14 TABLET | Refills: 0 | Status: SHIPPED | OUTPATIENT
Start: 2020-03-30 | End: 2020-06-10

## 2020-03-30 NOTE — PROGRESS NOTES
Subjective   Cindy Hawk is a 44 y.o. female.   You have chosen to receive care through a telephone visit today. Do you consent to use a telephone visit for your medical care today? Yes  History of Present Illness   Cindy Hawk 44 y.o. female presents for evaluation and treatment of  low back pain. The patient first noted symptoms a few days ago. It was not related to none recalled by the patient.  The pain intensity is moderate and severe , and is located left side, lumbar, buttock and posterior thigh. The pain is described as burning and occurs constantly. The symptoms are progressive. Symptoms are exacerbated by Sitting, definitely standing, walking, laying down and less she is on her side with a pillow between her knees. Factors which relieve the pain include nothing helps. Other associated symptoms include numbness, denies pain radiating down right leg, denies motor loss, denies sensory loss and denies N/T in right leg. Previous history of symptoms: Never like this in the past. Treatment efforts have included heat, ice, movement and Ultram and no help; Zanaflex and no help , without relief.        Thurs PM; sciatic pain; lidocaine patch helps; some help Zanaflex  Left lumbar down post leg; tingling in foot; pain worse today  I will have her try oral pred  The following portions of the patient's history were reviewed and updated as appropriate: allergies, current medications, past family history, past medical history, past social history, past surgical history and problem list.    Review of Systems   Constitutional: Positive for fatigue. Negative for activity change, appetite change and unexpected weight change.   HENT: Positive for postnasal drip and tinnitus. Negative for nosebleeds and trouble swallowing.    Eyes: Negative for pain and visual disturbance.   Respiratory: Negative for chest tightness, shortness of breath and wheezing.    Cardiovascular: Negative for chest pain and palpitations.    Gastrointestinal: Negative for abdominal pain and blood in stool.   Endocrine: Negative.    Genitourinary: Negative for difficulty urinating and hematuria.   Musculoskeletal: Positive for arthralgias, back pain, gait problem, myalgias, neck pain and neck stiffness. Negative for joint swelling.   Skin: Negative for color change and rash.   Allergic/Immunologic: Negative.    Neurological: Positive for dizziness, tremors, weakness, light-headedness, numbness and headaches. Negative for syncope and speech difficulty.   Hematological: Negative for adenopathy.   Psychiatric/Behavioral: Negative for agitation and confusion. The patient is nervous/anxious.    All other systems reviewed and are negative.      Objective   Physical Exam   Constitutional: She is oriented to person, place, and time.   Sounds miserable from pain on phone   Pulmonary/Chest: Effort normal.   Neurological: She is alert and oriented to person, place, and time.   Psychiatric: She has a normal mood and affect. Her behavior is normal. Judgment and thought content normal.       Assessment/Plan   Cindy was seen today for back pain and leg pain.    Diagnoses and all orders for this visit:    Back pain of lumbar region with sciatica  Comments:  left    Other orders  -     predniSONE (DELTASONE) 20 MG tablet; One PO BID with food for 7 days    Telephone visit completed today for new onset left sciatic pain will send oral prednisone to pharmacy  She can try ice   Need to consider lumbar x-rays and referral to PT if continues to have the burning pain  Time spent 11 minutes

## 2020-03-30 NOTE — PATIENT INSTRUCTIONS
Sciatica    Sciatica is pain, numbness, weakness, or tingling along the path of the sciatic nerve. The sciatic nerve starts in the lower back and runs down the back of each leg. The nerve controls the muscles in the lower leg and in the back of the knee. It also provides feeling (sensation) to the back of the thigh, the lower leg, and the sole of the foot. Sciatica is a symptom of another medical condition that pinches or puts pressure on the sciatic nerve.  Generally, sciatica only affects one side of the body. Sciatica usually goes away on its own or with treatment. In some cases, sciatica may keep coming back (recur).  What are the causes?  This condition is caused by pressure on the sciatic nerve, or pinching of the sciatic nerve. This may be the result of:  · A disk in between the bones of the spine (vertebrae) bulging out too far (herniated disk).  · Age-related changes in the spinal disks (degenerative disk disease).  · A pain disorder that affects a muscle in the buttock (piriformis syndrome).  · Extra bone growth (bone spur) near the sciatic nerve.  · An injury or break (fracture) of the pelvis.  · Pregnancy.  · Tumor (rare).  What increases the risk?  The following factors may make you more likely to develop this condition:  · Playing sports that place pressure or stress on the spine, such as football or weight lifting.  · Having poor strength and flexibility.  · A history of back injury.  · A history of back surgery.  · Sitting for long periods of time.  · Doing activities that involve repetitive bending or lifting.  · Obesity.  What are the signs or symptoms?  Symptoms can vary from mild to very severe, and they may include:  · Any of these problems in the lower back, leg, hip, or buttock:  ? Mild tingling or dull aches.  ? Burning sensations.  ? Sharp pains.  · Numbness in the back of the calf or the sole of the foot.  · Leg weakness.  · Severe back pain that makes movement difficult.  These symptoms  may get worse when you cough, sneeze, or laugh, or when you sit or stand for long periods of time. Being overweight may also make symptoms worse. In some cases, symptoms may recur over time.  How is this diagnosed?  This condition may be diagnosed based on:  · Your symptoms.  · A physical exam. Your health care provider may ask you to do certain movements to check whether those movements trigger your symptoms.  · You may have tests, including:  ? Blood tests.  ? X-rays.  ? MRI.  ? CT scan.  How is this treated?  In many cases, this condition improves on its own, without any treatment. However, treatment may include:  · Reducing or modifying physical activity during periods of pain.  · Exercising and stretching to strengthen your abdomen and improve the flexibility of your spine.  · Icing and applying heat to the affected area.  · Medicines that help:  ? To relieve pain and swelling.  ? To relax your muscles.  · Injections of medicines that help to relieve pain, irritation, and inflammation around the sciatic nerve (steroids).  · Surgery.  Follow these instructions at home:  Medicines  · Take over-the-counter and prescription medicines only as told by your health care provider.  · Do not drive or operate heavy machinery while taking prescription pain medicine.  Managing pain  · If directed, apply ice to the affected area.  ? Put ice in a plastic bag.  ? Place a towel between your skin and the bag.  ? Leave the ice on for 20 minutes, 2-3 times a day.  · After icing, apply heat to the affected area before you exercise or as often as told by your health care provider. Use the heat source that your health care provider recommends, such as a moist heat pack or a heating pad.  ? Place a towel between your skin and the heat source.  ? Leave the heat on for 20-30 minutes.  ? Remove the heat if your skin turns bright red. This is especially important if you are unable to feel pain, heat, or cold. You may have a greater risk  of getting burned.  Activity  · Return to your normal activities as told by your health care provider. Ask your health care provider what activities are safe for you.  ? Avoid activities that make your symptoms worse.  · Take brief periods of rest throughout the day. Resting in a lying or standing position is usually better than sitting to rest.  ? When you rest for longer periods, mix in some mild activity or stretching between periods of rest. This will help to prevent stiffness and pain.  ? Avoid sitting for long periods of time without moving. Get up and move around at least one time each hour.  · Exercise and stretch regularly, as told by your health care provider.  · Do not lift anything that is heavier than 10 lb (4.5 kg) while you have symptoms of sciatica. When you do not have symptoms, you should still avoid heavy lifting, especially repetitive heavy lifting.  · When you lift objects, always use proper lifting technique, which includes:  ? Bending your knees.  ? Keeping the load close to your body.  ? Avoiding twisting.  General instructions  · Use good posture.  ? Avoid leaning forward while sitting.  ? Avoid hunching over while standing.  · Maintain a healthy weight. Excess weight puts extra stress on your back and makes it difficult to maintain good posture.  · Wear supportive, comfortable shoes. Avoid wearing high heels.  · Avoid sleeping on a mattress that is too soft or too hard. A mattress that is firm enough to support your back when you sleep may help to reduce your pain.  · Keep all follow-up visits as told by your health care provider. This is important.  Contact a health care provider if:  · You have pain that wakes you up when you are sleeping.  · You have pain that gets worse when you lie down.  · Your pain is worse than you have experienced in the past.  · Your pain lasts longer than 4 weeks.  · You experience unexplained weight loss.  Get help right away if:  · You lose control of your  bowel or bladder (incontinence).  · You have:  ? Weakness in your lower back, pelvis, buttocks, or legs that gets worse.  ? Redness or swelling of your back.  ? A burning sensation when you urinate.  This information is not intended to replace advice given to you by your health care provider. Make sure you discuss any questions you have with your health care provider.  Document Released: 12/12/2002 Document Revised: 05/23/2017 Document Reviewed: 08/26/2016  [employer] Patient Education © 2019 [employer]

## 2020-06-10 ENCOUNTER — TELEMEDICINE (OUTPATIENT)
Dept: FAMILY MEDICINE CLINIC | Facility: CLINIC | Age: 45
End: 2020-06-10

## 2020-06-10 DIAGNOSIS — E53.8 VITAMIN B12 DEFICIENCY: ICD-10-CM

## 2020-06-10 DIAGNOSIS — G44.89 OTHER HEADACHE SYNDROME: ICD-10-CM

## 2020-06-10 DIAGNOSIS — F41.9 ANXIETY: ICD-10-CM

## 2020-06-10 DIAGNOSIS — E03.9 ACQUIRED HYPOTHYROIDISM: Primary | ICD-10-CM

## 2020-06-10 DIAGNOSIS — E78.2 MIXED HYPERLIPIDEMIA: ICD-10-CM

## 2020-06-10 DIAGNOSIS — E55.9 VITAMIN D DEFICIENCY DISEASE: ICD-10-CM

## 2020-06-10 DIAGNOSIS — G93.5 ARNOLD-CHIARI MALFORMATION, TYPE I (HCC): ICD-10-CM

## 2020-06-10 PROCEDURE — 99213 OFFICE O/P EST LOW 20 MIN: CPT | Performed by: PHYSICIAN ASSISTANT

## 2020-06-10 RX ORDER — MAGNESIUM 200 MG
TABLET ORAL
Qty: 90 EACH | Refills: 3 | Status: SHIPPED | OUTPATIENT
Start: 2020-06-10 | End: 2021-04-16 | Stop reason: SDUPTHER

## 2020-06-10 RX ORDER — TRAMADOL HYDROCHLORIDE 50 MG/1
50 TABLET ORAL EVERY 6 HOURS PRN
Qty: 90 TABLET | Refills: 2 | Status: SHIPPED | OUTPATIENT
Start: 2020-06-10 | End: 2020-11-19 | Stop reason: SDUPTHER

## 2020-06-10 NOTE — PROGRESS NOTES
Subjective   Cindy Hawk is a 44 y.o. female.   You have chosen to receive care through a telehealth visit.  Do you consent to use a video/audio connection for your medical care today? Yes  History of Present Illness    Since the last visit, she has overall felt well.  She has Hyperlipidemia and working on this with diet and exercise, Hypothyroidism and must update labs to continue treatment and Vitamin D deficiency and will update labs for continued management.  she has been compliant with current medications have reviewed them.  The patient denies medication side effects.  Will refill medications. LMP 06/03/2020   I do want to B12 added----we will see if the oral supplementation of B12 is effective due to difficulty getting in here for B12 shots.  Results for orders placed or performed during the hospital encounter of 01/08/20   Comprehensive Metabolic Panel   Result Value Ref Range    Glucose 136 (H) 65 - 99 mg/dL    BUN 16 6 - 20 mg/dL    Creatinine 0.97 0.57 - 1.00 mg/dL    Sodium 138 136 - 145 mmol/L    Potassium 4.5 3.5 - 5.2 mmol/L    Chloride 103 98 - 107 mmol/L    CO2 25.3 22.0 - 29.0 mmol/L    Calcium 8.8 8.6 - 10.5 mg/dL    Total Protein 7.2 6.0 - 8.5 g/dL    Albumin 4.20 3.50 - 5.20 g/dL    ALT (SGPT) 20 1 - 33 U/L    AST (SGOT) 17 1 - 32 U/L    Alkaline Phosphatase 72 39 - 117 U/L    Total Bilirubin 0.2 0.2 - 1.2 mg/dL    eGFR Non African Amer 63 >60 mL/min/1.73    Globulin 3.0 gm/dL    A/G Ratio 1.4 g/dL    BUN/Creatinine Ratio 16.5 7.0 - 25.0    Anion Gap 9.7 5.0 - 15.0 mmol/L   Lipase   Result Value Ref Range    Lipase 34 13 - 60 U/L   D-dimer, Quantitative   Result Value Ref Range    D-Dimer, Quantitative 0.49 0.00 - 0.49 MCGFEU/mL   Troponin   Result Value Ref Range    Troponin T <0.010 0.000 - 0.030 ng/mL   CBC Auto Differential   Result Value Ref Range    WBC 10.06 3.40 - 10.80 10*3/mm3    RBC 4.37 3.77 - 5.28 10*6/mm3    Hemoglobin 13.4 12.0 - 15.9 g/dL    Hematocrit 40.7 34.0 - 46.6 %     MCV 93.1 79.0 - 97.0 fL    MCH 30.7 26.6 - 33.0 pg    MCHC 32.9 31.5 - 35.7 g/dL    RDW 12.3 12.3 - 15.4 %    RDW-SD 42.5 37.0 - 54.0 fl    MPV 9.1 6.0 - 12.0 fL    Platelets 311 140 - 450 10*3/mm3    Neutrophil % 59.7 42.7 - 76.0 %    Lymphocyte % 30.8 19.6 - 45.3 %    Monocyte % 5.6 5.0 - 12.0 %    Eosinophil % 3.0 0.3 - 6.2 %    Basophil % 0.7 0.0 - 1.5 %    Immature Grans % 0.2 0.0 - 0.5 %    Neutrophils, Absolute 6.01 1.70 - 7.00 10*3/mm3    Lymphocytes, Absolute 3.10 0.70 - 3.10 10*3/mm3    Monocytes, Absolute 0.56 0.10 - 0.90 10*3/mm3    Eosinophils, Absolute 0.30 0.00 - 0.40 10*3/mm3    Basophils, Absolute 0.07 0.00 - 0.20 10*3/mm3    Immature Grans, Absolute 0.02 0.00 - 0.05 10*3/mm3    nRBC 0.1 0.0 - 0.2 /100 WBC   She did a video visit in March for back pain and prescribed prednisone and that did help but just took a while for her pain to resolve and I do want to say it still resolved.  She will get labs in the next week or so and knows they are overdue.  I do want her to reschedule her appointment with neurology for her chronic headaches.  She is on the tramadol for the chronic headaches. The patient has read and signed the [facility] Controlled Substance Contract.  I will continue to see patient for regular follow up appointments.  They are well controlled on their medication.  LISA has been reviewed by me and is updated every 3 months. The patient is aware of the potential for addiction and dependence.  Needs labs!!  Stop smoking  Less h/a in summer  Also want a note Zoloft dose is great and went up 2 visits ago.  The following portions of the patient's history were reviewed and updated as appropriate: allergies, current medications, past family history, past medical history, past social history, past surgical history and problem list.    Review of Systems   Constitutional: Positive for fatigue and unexpected weight change. Negative for activity change and appetite change.   HENT: Positive for  postnasal drip, rhinorrhea and tinnitus. Negative for nosebleeds and trouble swallowing.    Eyes: Positive for visual disturbance. Negative for pain.   Respiratory: Positive for cough. Negative for chest tightness, shortness of breath and wheezing.    Cardiovascular: Negative for chest pain and palpitations.   Gastrointestinal: Negative for abdominal pain and blood in stool.   Endocrine: Negative.    Genitourinary: Negative for difficulty urinating and hematuria.   Musculoskeletal: Positive for arthralgias, back pain, myalgias, neck pain and neck stiffness. Negative for joint swelling.   Skin: Negative for color change and rash.   Allergic/Immunologic: Positive for environmental allergies.   Neurological: Positive for dizziness, tremors, weakness, light-headedness, numbness and headaches. Negative for syncope and speech difficulty.   Hematological: Negative for adenopathy.   Psychiatric/Behavioral: Negative for agitation and confusion. The patient is nervous/anxious.    All other systems reviewed and are negative.      Objective   Physical Exam   Constitutional: She is oriented to person, place, and time. She appears well-developed and well-nourished. No distress.   HENT:   Head: Normocephalic and atraumatic.   Right Ear: External ear normal.   Left Ear: External ear normal.   Eyes: Conjunctivae are normal. Right eye exhibits no discharge. Left eye exhibits no discharge. No scleral icterus.   Neck: Normal range of motion.   Pulmonary/Chest: Effort normal. No stridor. No respiratory distress.   Abdominal: Soft. There is no guarding.   Musculoskeletal: Normal range of motion.   Neurological: She is alert and oriented to person, place, and time. Coordination normal.   Skin: Skin is dry. She is not diaphoretic.   Psychiatric: She has a normal mood and affect. Her behavior is normal. Judgment and thought content normal.       Assessment/Plan   Cindy was seen today for headache.    Diagnoses and all orders for this  visit:    Acquired hypothyroidism    Arnold-Chiari malformation, type I (CMS/HCC)    Mixed hyperlipidemia    Vitamin D deficiency disease    Vitamin B12 deficiency    Anxiety    Other headache syndrome    Other orders  -     Cyanocobalamin (VITAMIN B-12) 1000 MCG sublingual tablet; One SL daily      Continue Zoloft for anxiety working well  Updating labs  Continue tramadol for the chronic headache and neck pain  See neurology for work-up of chronic headache  Stop smoking  Plan, Cindy Hawk, was seen today.  she was seen for Hyperlipidemia and will work on this with diet and exercise, Hypothyroidism and will need to update labs for continued treatment and Vitamin D deficiency and will update labs .  Time spent 20 minutes

## 2020-11-19 ENCOUNTER — OFFICE VISIT (OUTPATIENT)
Dept: FAMILY MEDICINE CLINIC | Facility: CLINIC | Age: 45
End: 2020-11-19

## 2020-11-19 DIAGNOSIS — E53.8 VITAMIN B12 DEFICIENCY: ICD-10-CM

## 2020-11-19 DIAGNOSIS — E78.2 MIXED HYPERLIPIDEMIA: ICD-10-CM

## 2020-11-19 DIAGNOSIS — G44.229 CHRONIC TENSION-TYPE HEADACHE, NOT INTRACTABLE: ICD-10-CM

## 2020-11-19 DIAGNOSIS — Z86.69 HISTORY OF CHIARI MALFORMATION: ICD-10-CM

## 2020-11-19 DIAGNOSIS — E55.9 VITAMIN D DEFICIENCY DISEASE: ICD-10-CM

## 2020-11-19 DIAGNOSIS — E03.9 ACQUIRED HYPOTHYROIDISM: Primary | ICD-10-CM

## 2020-11-19 DIAGNOSIS — F41.9 ANXIETY: ICD-10-CM

## 2020-11-19 DIAGNOSIS — G44.89 OTHER HEADACHE SYNDROME: ICD-10-CM

## 2020-11-19 DIAGNOSIS — M54.2 NECK PAIN: ICD-10-CM

## 2020-11-19 DIAGNOSIS — R11.0 NAUSEA: ICD-10-CM

## 2020-11-19 PROCEDURE — 99213 OFFICE O/P EST LOW 20 MIN: CPT | Performed by: PHYSICIAN ASSISTANT

## 2020-11-19 RX ORDER — TRAMADOL HYDROCHLORIDE 50 MG/1
50 TABLET ORAL EVERY 6 HOURS PRN
Qty: 90 TABLET | Refills: 2 | Status: SHIPPED | OUTPATIENT
Start: 2020-11-19 | End: 2021-04-16 | Stop reason: SDUPTHER

## 2020-11-19 RX ORDER — SERTRALINE HYDROCHLORIDE 100 MG/1
TABLET, FILM COATED ORAL
Qty: 60 TABLET | Refills: 11 | Status: SHIPPED | OUTPATIENT
Start: 2020-11-19 | End: 2021-09-14 | Stop reason: SDUPTHER

## 2020-11-19 RX ORDER — ONDANSETRON 4 MG/1
4 TABLET, FILM COATED ORAL EVERY 8 HOURS PRN
Qty: 45 TABLET | Refills: 5 | Status: SHIPPED | OUTPATIENT
Start: 2020-11-19 | End: 2021-09-14 | Stop reason: SDUPTHER

## 2020-11-19 NOTE — PROGRESS NOTES
Subjective   Cindy Hawk is a 45 y.o. female.     History of Present Illness    Since the last visit, she has overall felt fairly well.  She has Hypothyroidism and must update labs to continue treatment and Vitamin D deficiency and will update labs for continued management.  she has been compliant with current medications have reviewed them.  The patient denies medication side effects.  Will refill medications. There were no vitals taken for this visit.    Results for orders placed or performed during the hospital encounter of 01/08/20   Comprehensive Metabolic Panel    Specimen: Blood   Result Value Ref Range    Glucose 136 (H) 65 - 99 mg/dL    BUN 16 6 - 20 mg/dL    Creatinine 0.97 0.57 - 1.00 mg/dL    Sodium 138 136 - 145 mmol/L    Potassium 4.5 3.5 - 5.2 mmol/L    Chloride 103 98 - 107 mmol/L    CO2 25.3 22.0 - 29.0 mmol/L    Calcium 8.8 8.6 - 10.5 mg/dL    Total Protein 7.2 6.0 - 8.5 g/dL    Albumin 4.20 3.50 - 5.20 g/dL    ALT (SGPT) 20 1 - 33 U/L    AST (SGOT) 17 1 - 32 U/L    Alkaline Phosphatase 72 39 - 117 U/L    Total Bilirubin 0.2 0.2 - 1.2 mg/dL    eGFR Non African Amer 63 >60 mL/min/1.73    Globulin 3.0 gm/dL    A/G Ratio 1.4 g/dL    BUN/Creatinine Ratio 16.5 7.0 - 25.0    Anion Gap 9.7 5.0 - 15.0 mmol/L   Lipase    Specimen: Blood   Result Value Ref Range    Lipase 34 13 - 60 U/L   D-dimer, Quantitative    Specimen: Blood   Result Value Ref Range    D-Dimer, Quantitative 0.49 0.00 - 0.49 MCGFEU/mL   Troponin    Specimen: Blood   Result Value Ref Range    Troponin T <0.010 0.000 - 0.030 ng/mL   CBC Auto Differential    Specimen: Blood   Result Value Ref Range    WBC 10.06 3.40 - 10.80 10*3/mm3    RBC 4.37 3.77 - 5.28 10*6/mm3    Hemoglobin 13.4 12.0 - 15.9 g/dL    Hematocrit 40.7 34.0 - 46.6 %    MCV 93.1 79.0 - 97.0 fL    MCH 30.7 26.6 - 33.0 pg    MCHC 32.9 31.5 - 35.7 g/dL    RDW 12.3 12.3 - 15.4 %    RDW-SD 42.5 37.0 - 54.0 fl    MPV 9.1 6.0 - 12.0 fL    Platelets 311 140 - 450 10*3/mm3     Neutrophil % 59.7 42.7 - 76.0 %    Lymphocyte % 30.8 19.6 - 45.3 %    Monocyte % 5.6 5.0 - 12.0 %    Eosinophil % 3.0 0.3 - 6.2 %    Basophil % 0.7 0.0 - 1.5 %    Immature Grans % 0.2 0.0 - 0.5 %    Neutrophils, Absolute 6.01 1.70 - 7.00 10*3/mm3    Lymphocytes, Absolute 3.10 0.70 - 3.10 10*3/mm3    Monocytes, Absolute 0.56 0.10 - 0.90 10*3/mm3    Eosinophils, Absolute 0.30 0.00 - 0.40 10*3/mm3    Basophils, Absolute 0.07 0.00 - 0.20 10*3/mm3    Immature Grans, Absolute 0.02 0.00 - 0.05 10*3/mm3    nRBC 0.1 0.0 - 0.2 /100 WBC     Have to do labs to cont thyroid Rx  Need f/u on B12--make sure oral supp working  She is on the tramadol for the chronic headaches and neck pain from Chiari. The patient has read and signed the [facility] Controlled Substance Contract.  I will continue to see patient for regular follow up appointments.  They are well controlled on their medication.  LISA has been reviewed by me and is updated every 3 months. The patient is aware of the potential for addiction and dependence.  Needs labs!!  Stop smoking  Still need her to see neurology for chronic h/a's    Cindy Andrewly female 45 y.o., There were no vitals taken for this visit.  who presents today for follow up of Anxiety.  She reports medication is working well, patient desires to continue on Rx, and needs refill. Onset of symptoms was approximately several years ago.  She denies current suicidal and homicidal ideation. Risk factors are family history of anxiety and or depression and lifestyle of multiple roles.  Previous treatment includes current Rx.  She complains of the following medication side effects: none.  The patient declines to go to counseling..    Need to refill Zofran as needed for nausea associated with her headaches  The following portions of the patient's history were reviewed and updated as appropriate: allergies, current medications, past family history, past medical history, past social history, past surgical  history and problem list.    Review of Systems   Constitutional: Positive for fatigue and unexpected weight change. Negative for activity change and appetite change.   HENT: Positive for postnasal drip, rhinorrhea and tinnitus. Negative for nosebleeds and trouble swallowing.    Eyes: Positive for visual disturbance. Negative for pain.   Respiratory: Negative for cough, chest tightness, shortness of breath and wheezing.    Cardiovascular: Negative for chest pain and palpitations.   Gastrointestinal: Positive for nausea and vomiting. Negative for abdominal pain and blood in stool.   Endocrine: Negative.    Genitourinary: Negative for difficulty urinating and hematuria.   Musculoskeletal: Positive for arthralgias, back pain, myalgias, neck pain and neck stiffness. Negative for joint swelling.   Skin: Negative for color change and rash.   Allergic/Immunologic: Positive for environmental allergies.   Neurological: Positive for dizziness, tremors, weakness, light-headedness, numbness and headaches. Negative for syncope and speech difficulty.   Hematological: Negative for adenopathy.   Psychiatric/Behavioral: Negative for agitation and confusion. The patient is not nervous/anxious.    All other systems reviewed and are negative.      Objective   Physical Exam  Constitutional:       General: She is not in acute distress.     Appearance: She is well-developed. She is not ill-appearing, toxic-appearing or diaphoretic.   HENT:      Head: Normocephalic and atraumatic.      Right Ear: External ear normal.      Left Ear: External ear normal.   Eyes:      General: No scleral icterus.     Conjunctiva/sclera: Conjunctivae normal.   Neck:      Musculoskeletal: Normal range of motion.   Pulmonary:      Effort: Pulmonary effort is normal. No respiratory distress.   Musculoskeletal: Normal range of motion.   Skin:     General: Skin is dry.      Coloration: Skin is not jaundiced.   Neurological:      Mental Status: She is alert and  oriented to person, place, and time. Mental status is at baseline.      Coordination: Coordination normal.   Psychiatric:         Mood and Affect: Mood normal.         Behavior: Behavior normal.         Thought Content: Thought content normal.         Judgment: Judgment normal.         Assessment/Plan   Diagnoses and all orders for this visit:    1. Acquired hypothyroidism (Primary)    2. Mixed hyperlipidemia    3. Other headache syndrome    4. History of Chiari malformation    5. Vitamin D deficiency disease    6. Vitamin B12 deficiency    7. Chronic tension-type headache, not intractable    8. Neck pain    9. Anxiety      Still want her to see neurologist for h/a's  Cont Ultram for neck pain and h/a from Chiari  Cont B12 and D  Must get labs to refill meds  Plan, Cindy Hawk, was seen today.  she was seen for Hypothyroidism and will need to update labs for continued treatment and Vitamin D deficiency and will update labs .  Cont Zoloft for anxiety and working well  Refill Zofran for nausea--works well PRN  Stop smoking

## 2020-12-11 ENCOUNTER — LAB (OUTPATIENT)
Dept: LAB | Facility: HOSPITAL | Age: 45
End: 2020-12-11

## 2020-12-11 DIAGNOSIS — R73.01 IMPAIRED FASTING GLUCOSE: Primary | ICD-10-CM

## 2020-12-11 DIAGNOSIS — E03.9 ACQUIRED HYPOTHYROIDISM: ICD-10-CM

## 2020-12-11 DIAGNOSIS — E53.8 VITAMIN B12 DEFICIENCY: ICD-10-CM

## 2020-12-11 LAB
25(OH)D3 SERPL-MCNC: 31.1 NG/ML (ref 30–100)
ALBUMIN SERPL-MCNC: 4.3 G/DL (ref 3.5–5.2)
ALBUMIN/GLOB SERPL: 1.5 G/DL
ALP SERPL-CCNC: 76 U/L (ref 39–117)
ALT SERPL W P-5'-P-CCNC: 15 U/L (ref 1–33)
ANION GAP SERPL CALCULATED.3IONS-SCNC: 4.5 MMOL/L (ref 5–15)
AST SERPL-CCNC: 15 U/L (ref 1–32)
BASOPHILS # BLD AUTO: 0.08 10*3/MM3 (ref 0–0.2)
BASOPHILS NFR BLD AUTO: 0.9 % (ref 0–1.5)
BILIRUB SERPL-MCNC: 0.2 MG/DL (ref 0–1.2)
BUN SERPL-MCNC: 13 MG/DL (ref 6–20)
BUN/CREAT SERPL: 15.5 (ref 7–25)
CALCIUM SPEC-SCNC: 8.8 MG/DL (ref 8.6–10.5)
CHLORIDE SERPL-SCNC: 106 MMOL/L (ref 98–107)
CHOLEST SERPL-MCNC: 201 MG/DL (ref 0–200)
CO2 SERPL-SCNC: 26.5 MMOL/L (ref 22–29)
CREAT SERPL-MCNC: 0.84 MG/DL (ref 0.57–1)
DEPRECATED RDW RBC AUTO: 42.6 FL (ref 37–54)
EOSINOPHIL # BLD AUTO: 0.3 10*3/MM3 (ref 0–0.4)
EOSINOPHIL NFR BLD AUTO: 3.5 % (ref 0.3–6.2)
ERYTHROCYTE [DISTWIDTH] IN BLOOD BY AUTOMATED COUNT: 12.9 % (ref 12.3–15.4)
FOLATE SERPL-MCNC: 11.1 NG/ML (ref 4.78–24.2)
GFR SERPL CREATININE-BSD FRML MDRD: 74 ML/MIN/1.73
GLOBULIN UR ELPH-MCNC: 2.9 GM/DL
GLUCOSE SERPL-MCNC: 103 MG/DL (ref 65–99)
HCT VFR BLD AUTO: 41.3 % (ref 34–46.6)
HDLC SERPL-MCNC: 45 MG/DL (ref 40–60)
HGB BLD-MCNC: 14 G/DL (ref 12–15.9)
IMM GRANULOCYTES # BLD AUTO: 0.01 10*3/MM3 (ref 0–0.05)
IMM GRANULOCYTES NFR BLD AUTO: 0.1 % (ref 0–0.5)
LDLC SERPL CALC-MCNC: 135 MG/DL (ref 0–100)
LDLC/HDLC SERPL: 2.95 {RATIO}
LYMPHOCYTES # BLD AUTO: 2.98 10*3/MM3 (ref 0.7–3.1)
LYMPHOCYTES NFR BLD AUTO: 34.8 % (ref 19.6–45.3)
MCH RBC QN AUTO: 30.7 PG (ref 26.6–33)
MCHC RBC AUTO-ENTMCNC: 33.9 G/DL (ref 31.5–35.7)
MCV RBC AUTO: 90.6 FL (ref 79–97)
MONOCYTES # BLD AUTO: 0.66 10*3/MM3 (ref 0.1–0.9)
MONOCYTES NFR BLD AUTO: 7.7 % (ref 5–12)
NEUTROPHILS NFR BLD AUTO: 4.53 10*3/MM3 (ref 1.7–7)
NEUTROPHILS NFR BLD AUTO: 53 % (ref 42.7–76)
NRBC BLD AUTO-RTO: 0 /100 WBC (ref 0–0.2)
PLATELET # BLD AUTO: 270 10*3/MM3 (ref 140–450)
PMV BLD AUTO: 9 FL (ref 6–12)
POTASSIUM SERPL-SCNC: 4.8 MMOL/L (ref 3.5–5.2)
PROT SERPL-MCNC: 7.2 G/DL (ref 6–8.5)
RBC # BLD AUTO: 4.56 10*6/MM3 (ref 3.77–5.28)
SODIUM SERPL-SCNC: 137 MMOL/L (ref 136–145)
T3FREE SERPL-MCNC: 2.45 PG/ML (ref 2–4.4)
T4 FREE SERPL-MCNC: 0.73 NG/DL (ref 0.93–1.7)
TRIGL SERPL-MCNC: 117 MG/DL (ref 0–150)
TSH SERPL DL<=0.05 MIU/L-ACNC: 19.6 UIU/ML (ref 0.27–4.2)
VIT B12 BLD-MCNC: 283 PG/ML (ref 211–946)
VLDLC SERPL-MCNC: 21 MG/DL (ref 5–40)
WBC # BLD AUTO: 8.56 10*3/MM3 (ref 3.4–10.8)

## 2020-12-11 PROCEDURE — 82306 VITAMIN D 25 HYDROXY: CPT | Performed by: PHYSICIAN ASSISTANT

## 2020-12-11 PROCEDURE — 84481 FREE ASSAY (FT-3): CPT | Performed by: PHYSICIAN ASSISTANT

## 2020-12-11 PROCEDURE — 80061 LIPID PANEL: CPT | Performed by: PHYSICIAN ASSISTANT

## 2020-12-11 PROCEDURE — 82746 ASSAY OF FOLIC ACID SERUM: CPT | Performed by: PHYSICIAN ASSISTANT

## 2020-12-11 PROCEDURE — 80053 COMPREHEN METABOLIC PANEL: CPT | Performed by: PHYSICIAN ASSISTANT

## 2020-12-11 PROCEDURE — 82607 VITAMIN B-12: CPT | Performed by: PHYSICIAN ASSISTANT

## 2020-12-11 PROCEDURE — 85025 COMPLETE CBC W/AUTO DIFF WBC: CPT | Performed by: PHYSICIAN ASSISTANT

## 2020-12-11 PROCEDURE — 36415 COLL VENOUS BLD VENIPUNCTURE: CPT | Performed by: PHYSICIAN ASSISTANT

## 2020-12-11 PROCEDURE — 84439 ASSAY OF FREE THYROXINE: CPT | Performed by: PHYSICIAN ASSISTANT

## 2020-12-11 PROCEDURE — 84443 ASSAY THYROID STIM HORMONE: CPT | Performed by: PHYSICIAN ASSISTANT

## 2020-12-11 RX ORDER — LEVOTHYROXINE SODIUM 137 UG/1
137 TABLET ORAL DAILY
Qty: 90 TABLET | Refills: 3 | Status: SHIPPED | OUTPATIENT
Start: 2020-12-11 | End: 2021-09-14 | Stop reason: SDUPTHER

## 2021-04-02 ENCOUNTER — BULK ORDERING (OUTPATIENT)
Dept: CASE MANAGEMENT | Facility: OTHER | Age: 46
End: 2021-04-02

## 2021-04-02 DIAGNOSIS — Z23 IMMUNIZATION DUE: ICD-10-CM

## 2021-04-16 ENCOUNTER — TELEMEDICINE (OUTPATIENT)
Dept: FAMILY MEDICINE CLINIC | Facility: CLINIC | Age: 46
End: 2021-04-16

## 2021-04-16 DIAGNOSIS — G44.229 CHRONIC TENSION-TYPE HEADACHE, NOT INTRACTABLE: ICD-10-CM

## 2021-04-16 DIAGNOSIS — E53.8 VITAMIN B12 DEFICIENCY: ICD-10-CM

## 2021-04-16 DIAGNOSIS — E03.9 ACQUIRED HYPOTHYROIDISM: Primary | ICD-10-CM

## 2021-04-16 DIAGNOSIS — M54.2 NECK PAIN: ICD-10-CM

## 2021-04-16 DIAGNOSIS — G44.89 OTHER HEADACHE SYNDROME: ICD-10-CM

## 2021-04-16 DIAGNOSIS — Z86.69 HISTORY OF CHIARI MALFORMATION: ICD-10-CM

## 2021-04-16 DIAGNOSIS — F41.9 ANXIETY: ICD-10-CM

## 2021-04-16 DIAGNOSIS — E78.2 MIXED HYPERLIPIDEMIA: ICD-10-CM

## 2021-04-16 DIAGNOSIS — E55.9 VITAMIN D DEFICIENCY DISEASE: ICD-10-CM

## 2021-04-16 DIAGNOSIS — G93.5 ARNOLD-CHIARI MALFORMATION, TYPE I (HCC): ICD-10-CM

## 2021-04-16 PROCEDURE — 99214 OFFICE O/P EST MOD 30 MIN: CPT | Performed by: PHYSICIAN ASSISTANT

## 2021-04-16 RX ORDER — MAGNESIUM 200 MG
TABLET ORAL
Qty: 90 EACH | Refills: 3 | Status: SHIPPED | OUTPATIENT
Start: 2021-04-16

## 2021-04-16 RX ORDER — TRAMADOL HYDROCHLORIDE 50 MG/1
50 TABLET ORAL EVERY 6 HOURS PRN
Qty: 90 TABLET | Refills: 2 | Status: SHIPPED | OUTPATIENT
Start: 2021-04-16 | End: 2021-09-14 | Stop reason: SDUPTHER

## 2021-04-16 RX ORDER — HYDROCODONE BITARTRATE AND ACETAMINOPHEN 5; 325 MG/1; MG/1
1 TABLET ORAL
COMMUNITY
Start: 2021-04-14 | End: 2021-05-14

## 2021-04-16 RX ORDER — METHYLPREDNISOLONE 4 MG/1
TABLET ORAL
COMMUNITY
Start: 2021-04-14 | End: 2021-09-14 | Stop reason: SDDI

## 2021-04-16 NOTE — PROGRESS NOTES
Subjective   Cindy Hwak is a 45 y.o. female.   You have chosen to receive care through a telehealth visit.  Do you consent to use a video/audio connection for your medical care today? Yes    History of Present Illness    Since the last visit, she has overall felt tired.  She has Hyperlipidemia and working on this with diet and exercise, Hypothyroidism and must update labs to continue treatment and Vitamin D deficiency and labs are at goal >30 ng/mL.  she has been compliant with current medications have reviewed them.  The patient denies medication side effects.  Will refill medications. There were no vitals taken for this visit.  BMI Readings from Last 1 Encounters:   03/30/20 29.44 kg/m²       Results for orders placed or performed in visit on 03/01/20   Comprehensive metabolic panel    Specimen: Blood   Result Value Ref Range    Glucose 103 (H) 65 - 99 mg/dL    BUN 13 6 - 20 mg/dL    Creatinine 0.84 0.57 - 1.00 mg/dL    Sodium 137 136 - 145 mmol/L    Potassium 4.8 3.5 - 5.2 mmol/L    Chloride 106 98 - 107 mmol/L    CO2 26.5 22.0 - 29.0 mmol/L    Calcium 8.8 8.6 - 10.5 mg/dL    Total Protein 7.2 6.0 - 8.5 g/dL    Albumin 4.30 3.50 - 5.20 g/dL    ALT (SGPT) 15 1 - 33 U/L    AST (SGOT) 15 1 - 32 U/L    Alkaline Phosphatase 76 39 - 117 U/L    Total Bilirubin 0.2 0.0 - 1.2 mg/dL    eGFR Non African Amer 74 >60 mL/min/1.73    Globulin 2.9 gm/dL    A/G Ratio 1.5 g/dL    BUN/Creatinine Ratio 15.5 7.0 - 25.0    Anion Gap 4.5 (L) 5.0 - 15.0 mmol/L   Lipid panel    Specimen: Blood   Result Value Ref Range    Total Cholesterol 201 (H) 0 - 200 mg/dL    Triglycerides 117 0 - 150 mg/dL    HDL Cholesterol 45 40 - 60 mg/dL    LDL Cholesterol  135 (H) 0 - 100 mg/dL    VLDL Cholesterol 21 5 - 40 mg/dL    LDL/HDL Ratio 2.95    TSH    Specimen: Blood   Result Value Ref Range    TSH 19.600 (H) 0.270 - 4.200 uIU/mL   T3, Free    Specimen: Blood   Result Value Ref Range    T3, Free 2.45 2.00 - 4.40 pg/mL   T4, Free    Specimen:  Blood   Result Value Ref Range    Free T4 0.73 (L) 0.93 - 1.70 ng/dL   Vitamin B12    Specimen: Blood   Result Value Ref Range    Vitamin B-12 283 211 - 946 pg/mL   Folate    Specimen: Blood   Result Value Ref Range    Folate 11.10 4.78 - 24.20 ng/mL   Vitamin D 25 Hydroxy    Specimen: Blood   Result Value Ref Range    25 Hydroxy, Vitamin D 31.1 30.0 - 100.0 ng/ml   CBC Auto Differential    Specimen: Blood   Result Value Ref Range    WBC 8.56 3.40 - 10.80 10*3/mm3    RBC 4.56 3.77 - 5.28 10*6/mm3    Hemoglobin 14.0 12.0 - 15.9 g/dL    Hematocrit 41.3 34.0 - 46.6 %    MCV 90.6 79.0 - 97.0 fL    MCH 30.7 26.6 - 33.0 pg    MCHC 33.9 31.5 - 35.7 g/dL    RDW 12.9 12.3 - 15.4 %    RDW-SD 42.6 37.0 - 54.0 fl    MPV 9.0 6.0 - 12.0 fL    Platelets 270 140 - 450 10*3/mm3    Neutrophil % 53.0 42.7 - 76.0 %    Lymphocyte % 34.8 19.6 - 45.3 %    Monocyte % 7.7 5.0 - 12.0 %    Eosinophil % 3.5 0.3 - 6.2 %    Basophil % 0.9 0.0 - 1.5 %    Immature Grans % 0.1 0.0 - 0.5 %    Neutrophils, Absolute 4.53 1.70 - 7.00 10*3/mm3    Lymphocytes, Absolute 2.98 0.70 - 3.10 10*3/mm3    Monocytes, Absolute 0.66 0.10 - 0.90 10*3/mm3    Eosinophils, Absolute 0.30 0.00 - 0.40 10*3/mm3    Basophils, Absolute 0.08 0.00 - 0.20 10*3/mm3    Immature Grans, Absolute 0.01 0.00 - 0.05 10*3/mm3    nRBC 0.0 0.0 - 0.2 /100 WBC   Stop smoking  Had labs Dec--chol score 3.2%    Stays on Ultram for chronic h/a---hx Chiari     She saw ortho spine d/t fall---having back pain----she was given Norco---hold Ultram while on this  Has CT lumbar spine  She is on oral V15---jgktj-----idf is back on it    Still need her to see neurology for chronic h/a's  The following portions of the patient's history were reviewed and updated as appropriate: allergies, current medications, past family history, past medical history, past social history, past surgical history and problem list.    Review of Systems   Constitutional: Negative for activity change, appetite change and  unexpected weight change.   HENT: Negative for nosebleeds and trouble swallowing.    Eyes: Negative for pain and visual disturbance.   Respiratory: Negative for chest tightness, shortness of breath and wheezing.    Cardiovascular: Negative for chest pain and palpitations.   Gastrointestinal: Negative for abdominal pain and blood in stool.   Endocrine: Negative.    Genitourinary: Negative for difficulty urinating and hematuria.   Musculoskeletal: Positive for arthralgias. Negative for joint swelling.   Skin: Negative for color change and rash.   Allergic/Immunologic: Negative.    Neurological: Positive for headaches. Negative for syncope and speech difficulty.   Hematological: Negative for adenopathy.   Psychiatric/Behavioral: Negative for agitation and confusion.   All other systems reviewed and are negative.      Objective   Physical Exam  Constitutional:       General: She is not in acute distress.     Appearance: She is well-developed. She is obese. She is not ill-appearing or diaphoretic.   HENT:      Head: Normocephalic and atraumatic.   Eyes:      General: No scleral icterus.     Conjunctiva/sclera: Conjunctivae normal.   Pulmonary:      Effort: Pulmonary effort is normal. No respiratory distress.   Musculoskeletal:         General: Normal range of motion.      Cervical back: Normal range of motion.   Skin:     General: Skin is dry.   Neurological:      Mental Status: She is alert and oriented to person, place, and time.      Coordination: Coordination normal.   Psychiatric:         Mood and Affect: Mood normal.         Behavior: Behavior normal.         Thought Content: Thought content normal.         Judgment: Judgment normal.         Assessment/Plan   Diagnoses and all orders for this visit:    1. Acquired hypothyroidism (Primary)    2. Vitamin B12 deficiency    3. Vitamin D deficiency disease    4. Arnold-Chiari malformation, type I (CMS/HCC)    5. Anxiety    6. Mixed hyperlipidemia    7. Chronic  tension-type headache, not intractable  -     traMADol (ULTRAM) 50 MG tablet; Take 1 tablet by mouth Every 6 (Six) Hours As Needed for Moderate Pain . Hold this Rx if take Hydrocodone  Dispense: 90 tablet; Refill: 2    8. Other headache syndrome  -     traMADol (ULTRAM) 50 MG tablet; Take 1 tablet by mouth Every 6 (Six) Hours As Needed for Moderate Pain . Hold this Rx if take Hydrocodone  Dispense: 90 tablet; Refill: 2    9. History of Chiari malformation  -     traMADol (ULTRAM) 50 MG tablet; Take 1 tablet by mouth Every 6 (Six) Hours As Needed for Moderate Pain . Hold this Rx if take Hydrocodone  Dispense: 90 tablet; Refill: 2    10. Neck pain  -     traMADol (ULTRAM) 50 MG tablet; Take 1 tablet by mouth Every 6 (Six) Hours As Needed for Moderate Pain . Hold this Rx if take Hydrocodone  Dispense: 90 tablet; Refill: 2    Other orders  -     Cyanocobalamin (Vitamin B-12) 1000 MCG sublingual tablet; One SL daily  Dispense: 90 each; Refill: 3    Plan, Cindy Hawk, was seen today.  she was seen for Hyperlipidemia and will work on this with diet and exercise, Hypothyroidism and will need to update labs for continued treatment and Vitamin D deficiency and supplemented.  Continue Zoloft for anxiety working well  She is taking B12 every day again and need follow-up on this lab and thyroid due to recent med change  Still suggest she follow-up with neurology  She knows to hold Ultram when she takes hydrocodone  Follow-up with spine clinic  She saw ortho spine d/t fall---having back pain----she was given Norco---hold Ultram while on this  Time 15 min

## 2021-09-14 ENCOUNTER — OFFICE VISIT (OUTPATIENT)
Dept: FAMILY MEDICINE CLINIC | Facility: CLINIC | Age: 46
End: 2021-09-14

## 2021-09-14 VITALS
HEART RATE: 65 BPM | HEIGHT: 67 IN | SYSTOLIC BLOOD PRESSURE: 110 MMHG | WEIGHT: 180.4 LBS | DIASTOLIC BLOOD PRESSURE: 72 MMHG | BODY MASS INDEX: 28.31 KG/M2 | TEMPERATURE: 97.5 F | OXYGEN SATURATION: 99 % | RESPIRATION RATE: 16 BRPM

## 2021-09-14 DIAGNOSIS — G44.89 OTHER HEADACHE SYNDROME: ICD-10-CM

## 2021-09-14 DIAGNOSIS — E03.9 ACQUIRED HYPOTHYROIDISM: Primary | ICD-10-CM

## 2021-09-14 DIAGNOSIS — G44.229 CHRONIC TENSION-TYPE HEADACHE, NOT INTRACTABLE: ICD-10-CM

## 2021-09-14 DIAGNOSIS — E78.2 MIXED HYPERLIPIDEMIA: ICD-10-CM

## 2021-09-14 DIAGNOSIS — E53.8 VITAMIN B12 DEFICIENCY: ICD-10-CM

## 2021-09-14 DIAGNOSIS — R42 DIZZINESS: ICD-10-CM

## 2021-09-14 DIAGNOSIS — Z86.69 HISTORY OF CHIARI MALFORMATION: ICD-10-CM

## 2021-09-14 DIAGNOSIS — H53.8 BLURRED VISION: ICD-10-CM

## 2021-09-14 DIAGNOSIS — R51.9 CHRONIC DAILY HEADACHE: ICD-10-CM

## 2021-09-14 DIAGNOSIS — I88.9 AXILLARY LYMPHADENITIS: ICD-10-CM

## 2021-09-14 DIAGNOSIS — F41.9 ANXIETY: ICD-10-CM

## 2021-09-14 DIAGNOSIS — R26.89 POOR BALANCE: ICD-10-CM

## 2021-09-14 DIAGNOSIS — E55.9 VITAMIN D DEFICIENCY DISEASE: ICD-10-CM

## 2021-09-14 DIAGNOSIS — G62.9 PERIPHERAL POLYNEUROPATHY: ICD-10-CM

## 2021-09-14 DIAGNOSIS — M54.2 NECK PAIN: ICD-10-CM

## 2021-09-14 PROBLEM — H18.529 ANTERIOR BASEMENT MEMBRANE DYSTROPHY: Status: ACTIVE | Noted: 2020-12-11

## 2021-09-14 PROBLEM — H53.10 SUBJECTIVE VISUAL DISTURBANCE: Status: ACTIVE | Noted: 2020-12-11

## 2021-09-14 PROBLEM — G93.5 ARNOLD-CHIARI MALFORMATION, TYPE I (HCC): Status: ACTIVE | Noted: 2021-09-14

## 2021-09-14 PROCEDURE — 99214 OFFICE O/P EST MOD 30 MIN: CPT | Performed by: PHYSICIAN ASSISTANT

## 2021-09-14 RX ORDER — TRAMADOL HYDROCHLORIDE 50 MG/1
50 TABLET ORAL EVERY 6 HOURS PRN
Qty: 90 TABLET | Refills: 2 | Status: SHIPPED | OUTPATIENT
Start: 2021-09-14 | End: 2022-04-26 | Stop reason: SDUPTHER

## 2021-09-14 RX ORDER — ONDANSETRON 4 MG/1
4 TABLET, FILM COATED ORAL EVERY 8 HOURS PRN
Qty: 45 TABLET | Refills: 11 | Status: SHIPPED | OUTPATIENT
Start: 2021-09-14

## 2021-09-14 RX ORDER — DOXYCYCLINE HYCLATE 100 MG/1
100 CAPSULE ORAL 2 TIMES DAILY
Qty: 20 CAPSULE | Refills: 0 | Status: SHIPPED | OUTPATIENT
Start: 2021-09-14 | End: 2021-10-07

## 2021-09-14 RX ORDER — LEVOTHYROXINE SODIUM 137 UG/1
137 TABLET ORAL DAILY
Qty: 90 TABLET | Refills: 3 | Status: SHIPPED | OUTPATIENT
Start: 2021-09-14 | End: 2021-09-15

## 2021-09-14 RX ORDER — SERTRALINE HYDROCHLORIDE 100 MG/1
TABLET, FILM COATED ORAL
Qty: 60 TABLET | Refills: 11 | Status: SHIPPED | OUTPATIENT
Start: 2021-09-14 | End: 2022-10-05

## 2021-09-14 RX ORDER — FLUCONAZOLE 150 MG/1
150 TABLET ORAL ONCE
Qty: 1 TABLET | Refills: 2 | Status: SHIPPED | OUTPATIENT
Start: 2021-09-14 | End: 2021-09-14

## 2021-09-14 NOTE — PATIENT INSTRUCTIONS

## 2021-09-14 NOTE — PROGRESS NOTES
"Subjective   Cindy Hawk is a 46 y.o. female.     History of Present Illness    Since the last visit, she has overall felt tired.  She has Impaired fasting glucose and will monitor labs to watch for DMII, Hyperlipidemia and working on this with diet and exercise, Hypothyroidism and must update labs to continue treatment and Vitamin D deficiency and will update labs for continued management.  she has been compliant with current medications have reviewed them.  The patient denies medication side effects.  Will refill medications. /72 (BP Location: Left arm, Patient Position: Sitting, Cuff Size: Adult)   Pulse 65   Temp 97.5 °F (36.4 °C)   Resp 16   Ht 170.2 cm (67.01\")   Wt 81.8 kg (180 lb 6.4 oz)   LMP 09/07/2021   SpO2 99%   BMI 28.25 kg/m²     Results for orders placed or performed in visit on 03/01/20   Comprehensive metabolic panel    Specimen: Blood   Result Value Ref Range    Glucose 103 (H) 65 - 99 mg/dL    BUN 13 6 - 20 mg/dL    Creatinine 0.84 0.57 - 1.00 mg/dL    Sodium 137 136 - 145 mmol/L    Potassium 4.8 3.5 - 5.2 mmol/L    Chloride 106 98 - 107 mmol/L    CO2 26.5 22.0 - 29.0 mmol/L    Calcium 8.8 8.6 - 10.5 mg/dL    Total Protein 7.2 6.0 - 8.5 g/dL    Albumin 4.30 3.50 - 5.20 g/dL    ALT (SGPT) 15 1 - 33 U/L    AST (SGOT) 15 1 - 32 U/L    Alkaline Phosphatase 76 39 - 117 U/L    Total Bilirubin 0.2 0.0 - 1.2 mg/dL    eGFR Non African Amer 74 >60 mL/min/1.73    Globulin 2.9 gm/dL    A/G Ratio 1.5 g/dL    BUN/Creatinine Ratio 15.5 7.0 - 25.0    Anion Gap 4.5 (L) 5.0 - 15.0 mmol/L   Lipid panel    Specimen: Blood   Result Value Ref Range    Total Cholesterol 201 (H) 0 - 200 mg/dL    Triglycerides 117 0 - 150 mg/dL    HDL Cholesterol 45 40 - 60 mg/dL    LDL Cholesterol  135 (H) 0 - 100 mg/dL    VLDL Cholesterol 21 5 - 40 mg/dL    LDL/HDL Ratio 2.95    TSH    Specimen: Blood   Result Value Ref Range    TSH 19.600 (H) 0.270 - 4.200 uIU/mL   T3, Free    Specimen: Blood   Result Value Ref " Range    T3, Free 2.45 2.00 - 4.40 pg/mL   T4, Free    Specimen: Blood   Result Value Ref Range    Free T4 0.73 (L) 0.93 - 1.70 ng/dL   Vitamin B12    Specimen: Blood   Result Value Ref Range    Vitamin B-12 283 211 - 946 pg/mL   Folate    Specimen: Blood   Result Value Ref Range    Folate 11.10 4.78 - 24.20 ng/mL   Vitamin D 25 Hydroxy    Specimen: Blood   Result Value Ref Range    25 Hydroxy, Vitamin D 31.1 30.0 - 100.0 ng/ml   CBC Auto Differential    Specimen: Blood   Result Value Ref Range    WBC 8.56 3.40 - 10.80 10*3/mm3    RBC 4.56 3.77 - 5.28 10*6/mm3    Hemoglobin 14.0 12.0 - 15.9 g/dL    Hematocrit 41.3 34.0 - 46.6 %    MCV 90.6 79.0 - 97.0 fL    MCH 30.7 26.6 - 33.0 pg    MCHC 33.9 31.5 - 35.7 g/dL    RDW 12.9 12.3 - 15.4 %    RDW-SD 42.6 37.0 - 54.0 fl    MPV 9.0 6.0 - 12.0 fL    Platelets 270 140 - 450 10*3/mm3    Neutrophil % 53.0 42.7 - 76.0 %    Lymphocyte % 34.8 19.6 - 45.3 %    Monocyte % 7.7 5.0 - 12.0 %    Eosinophil % 3.5 0.3 - 6.2 %    Basophil % 0.9 0.0 - 1.5 %    Immature Grans % 0.1 0.0 - 0.5 %    Neutrophils, Absolute 4.53 1.70 - 7.00 10*3/mm3    Lymphocytes, Absolute 2.98 0.70 - 3.10 10*3/mm3    Monocytes, Absolute 0.66 0.10 - 0.90 10*3/mm3    Eosinophils, Absolute 0.30 0.00 - 0.40 10*3/mm3    Basophils, Absolute 0.08 0.00 - 0.20 10*3/mm3    Immature Grans, Absolute 0.01 0.00 - 0.05 10*3/mm3    nRBC 0.0 0.0 - 0.2 /100 WBC     Just had vision checked  Having blurred vision, blind spots, dizzy, off balance  She is taking W26---aocj labs--if still low---change to injections  Numbness all over is worse  Still having h/a's;  Concern with all over numbness.  Still daily h/a's.  Pressure in neck.  Some tinnitus.  Lack of concentration.  The patient has read and signed the [facility] Controlled Substance Contract.  I will continue to see patient for regular follow up appointments.  They are well controlled on their medication.  LISA has been reviewed by me and is updated every 3 months. The  "patient is aware of the potential for addiction and dependence.    Need f/u thyroid    She is on oral J99---swwgi-----myt is back on it  Still need her to see neurology for chronic h/a's    Saw DR Espinoza in 2019---noted her surgical hx and reported cerebral tonsils ok.  Not surgical at that time and see neuro      Right axilla lump and sore----2 days    Cinyd B Carine female 46 y.o., /72 (BP Location: Left arm, Patient Position: Sitting, Cuff Size: Adult)   Pulse 65   Temp 97.5 °F (36.4 °C)   Resp 16   Ht 170.2 cm (67.01\")   Wt 81.8 kg (180 lb 6.4 oz)   LMP 09/07/2021   SpO2 99%   BMI 28.25 kg/m²   who presents today for follow up of Anxiety.  She reports medication is working well, patient desires to continue on Rx, and needs refill. Onset of symptoms was approximately several years ago.  She denies current suicidal and homicidal ideation. Risk factors are family history of anxiety and or depression and lifestyle of multiple roles.  Previous treatment includes current Rx. She complains of the following medication side effects: none. The patient declines to go to counseling..    Zofran helps nausea  The following portions of the patient's history were reviewed and updated as appropriate: allergies, current medications, past family history, past medical history, past social history, past surgical history and problem list.    Review of Systems   Constitutional: Negative for activity change, appetite change, fever and unexpected weight change.   HENT: Positive for tinnitus and trouble swallowing. Negative for nosebleeds.    Eyes: Negative for pain and visual disturbance.   Respiratory: Positive for choking. Negative for chest tightness, shortness of breath, wheezing and stridor.    Cardiovascular: Negative for chest pain and palpitations.   Gastrointestinal: Positive for nausea. Negative for abdominal pain and blood in stool.   Endocrine: Negative.  Negative for polydipsia.   Genitourinary: " Negative for difficulty urinating, genital sores and hematuria.   Musculoskeletal: Positive for arthralgias, back pain, gait problem, neck pain and neck stiffness. Negative for joint swelling.   Skin: Negative for color change and rash.   Allergic/Immunologic: Negative.  Negative for immunocompromised state.   Neurological: Positive for dizziness, tremors, weakness, light-headedness, numbness and headaches. Negative for seizures, syncope, facial asymmetry and speech difficulty.   Hematological: Negative for adenopathy.   Psychiatric/Behavioral: Negative for agitation, behavioral problems, confusion, self-injury and suicidal ideas.   All other systems reviewed and are negative.      Objective   Physical Exam  Vitals and nursing note reviewed.   Constitutional:       General: She is not in acute distress.     Appearance: She is well-developed. She is not ill-appearing or toxic-appearing.   HENT:      Head: Normocephalic.      Right Ear: External ear normal.      Left Ear: External ear normal.      Nose: Nose normal.      Mouth/Throat:      Pharynx: Oropharynx is clear.   Eyes:      General: No scleral icterus.     Conjunctiva/sclera: Conjunctivae normal.      Pupils: Pupils are equal, round, and reactive to light.   Neck:      Thyroid: No thyromegaly.   Cardiovascular:      Rate and Rhythm: Normal rate and regular rhythm.      Heart sounds: Normal heart sounds. No murmur heard.     Pulmonary:      Effort: Pulmonary effort is normal. No respiratory distress.      Breath sounds: Normal breath sounds. No rales.   Musculoskeletal:         General: Tenderness present. No deformity. Normal range of motion.      Cervical back: Normal range of motion and neck supple.   Skin:     General: Skin is warm and dry.      Coloration: Skin is not jaundiced.      Findings: No rash.      Comments: Pea size node anterior axilla right--sore, not red   Neurological:      General: No focal deficit present.      Mental Status: She is alert  and oriented to person, place, and time. Mental status is at baseline.      Sensory: Sensory deficit present.      Coordination: Coordination abnormal.      Gait: Gait abnormal.   Psychiatric:         Mood and Affect: Mood normal.         Behavior: Behavior normal.         Thought Content: Thought content normal.         Judgment: Judgment normal.         Assessment/Plan   Diagnoses and all orders for this visit:    1. Acquired hypothyroidism (Primary)    2. Peripheral polyneuropathy    3. Mixed hyperlipidemia    4. Anxiety    5. Vitamin D deficiency disease    6. Vitamin B12 deficiency    7. History of Chiari malformation    8. Chronic daily headache    9. Blurred vision    10. Dizziness    11. Poor balance    She is on oral T32---xhojn-----tkk is back on it  Still need her to see neurology for chronic h/a's  Zoloft working great for anxiety  Concern this daily h/a, n/t worse, blurred vision, dizziness, off balance, neck pain---?? MS or brain lesion.  Need MRI with and without contrast of brain and neck for work up ASAP.  Will refer again neurology.    Zofran helps nausea  Plan, Cindy Hawk, was seen today.  she was seen for Imparied fasting glucose and plan follow up labs, diet, and exercise, Hyperlipidemia and will work on this with diet and exercise, Hypothyroidism and will need to update labs for continued treatment and Vitamin D deficiency and will update labs .  Cont Ultram for chronic daily h/a and helps  Doxy for axillary lymphadenitis  Stop smoking

## 2021-09-15 LAB
25(OH)D3+25(OH)D2 SERPL-MCNC: 38.2 NG/ML (ref 30–100)
ALBUMIN SERPL-MCNC: 4.9 G/DL (ref 3.5–5.2)
ALBUMIN/GLOB SERPL: 2.1 G/DL
ALP SERPL-CCNC: 80 U/L (ref 39–117)
ALT SERPL-CCNC: 20 U/L (ref 1–33)
AST SERPL-CCNC: 20 U/L (ref 1–32)
BASOPHILS # BLD AUTO: NORMAL 10*3/UL
BASOPHILS # BLD MANUAL: 0.08 10*3/MM3 (ref 0–0.2)
BASOPHILS NFR BLD MANUAL: 1 % (ref 0–1.5)
BILIRUB SERPL-MCNC: 0.5 MG/DL (ref 0–1.2)
BUN SERPL-MCNC: 5 MG/DL (ref 6–20)
BUN/CREAT SERPL: 5.2 (ref 7–25)
CALCIUM SERPL-MCNC: 9.7 MG/DL (ref 8.6–10.5)
CHLORIDE SERPL-SCNC: 98 MMOL/L (ref 98–107)
CHOLEST SERPL-MCNC: 272 MG/DL (ref 0–200)
CO2 SERPL-SCNC: 25.2 MMOL/L (ref 22–29)
CREAT SERPL-MCNC: 0.97 MG/DL (ref 0.57–1)
DIFFERENTIAL COMMENT: NORMAL
EOSINOPHIL # BLD AUTO: NORMAL 10*3/UL
EOSINOPHIL # BLD MANUAL: 0.08 10*3/MM3 (ref 0–0.4)
EOSINOPHIL NFR BLD AUTO: NORMAL %
EOSINOPHIL NFR BLD MANUAL: 1 % (ref 0.3–6.2)
ERYTHROCYTE [DISTWIDTH] IN BLOOD BY AUTOMATED COUNT: 13.1 % (ref 12.3–15.4)
FOLATE SERPL-MCNC: 7.45 NG/ML (ref 4.78–24.2)
GLOBULIN SER CALC-MCNC: 2.3 GM/DL
GLUCOSE SERPL-MCNC: 89 MG/DL (ref 65–99)
HBA1C MFR BLD: 5.5 % (ref 4.8–5.6)
HCT VFR BLD AUTO: 45.5 % (ref 34–46.6)
HDLC SERPL-MCNC: 47 MG/DL (ref 40–60)
HGB BLD-MCNC: 15 G/DL (ref 12–15.9)
LDLC SERPL CALC-MCNC: 183 MG/DL (ref 0–100)
LYMPHOCYTES # BLD AUTO: NORMAL 10*3/UL
LYMPHOCYTES # BLD MANUAL: 1.92 10*3/MM3 (ref 0.7–3.1)
LYMPHOCYTES NFR BLD AUTO: NORMAL %
LYMPHOCYTES NFR BLD MANUAL: 24 % (ref 19.6–45.3)
MCH RBC QN AUTO: 30.5 PG (ref 26.6–33)
MCHC RBC AUTO-ENTMCNC: 33 G/DL (ref 31.5–35.7)
MCV RBC AUTO: 92.5 FL (ref 79–97)
MONOCYTES # BLD MANUAL: 0.58 10*3/MM3 (ref 0.1–0.9)
MONOCYTES NFR BLD AUTO: NORMAL %
MONOCYTES NFR BLD MANUAL: 7.3 % (ref 5–12)
NEUTROPHILS # BLD MANUAL: 5.33 10*3/MM3 (ref 1.7–7)
NEUTROPHILS NFR BLD AUTO: NORMAL %
NEUTROPHILS NFR BLD MANUAL: 66.7 % (ref 42.7–76)
PLATELET # BLD AUTO: 253 10*3/MM3 (ref 140–450)
PLATELET BLD QL SMEAR: NORMAL
POTASSIUM SERPL-SCNC: 4.5 MMOL/L (ref 3.5–5.2)
PROT SERPL-MCNC: 7.2 G/DL (ref 6–8.5)
RBC # BLD AUTO: 4.92 10*6/MM3 (ref 3.77–5.28)
RBC MORPH BLD: NORMAL
SODIUM SERPL-SCNC: 137 MMOL/L (ref 136–145)
T3FREE SERPL-MCNC: 2.3 PG/ML (ref 2–4.4)
T4 FREE SERPL-MCNC: 1.12 NG/DL (ref 0.93–1.7)
TRIGL SERPL-MCNC: 220 MG/DL (ref 0–150)
TSH SERPL DL<=0.005 MIU/L-ACNC: 24.4 UIU/ML (ref 0.27–4.2)
VIT B12 SERPL-MCNC: 872 PG/ML (ref 211–946)
VLDLC SERPL CALC-MCNC: 42 MG/DL (ref 5–40)
WBC # BLD AUTO: 7.99 10*3/MM3 (ref 3.4–10.8)

## 2021-09-15 RX ORDER — ROSUVASTATIN CALCIUM 10 MG/1
10 TABLET, COATED ORAL DAILY
Qty: 30 TABLET | Refills: 11 | Status: SHIPPED | OUTPATIENT
Start: 2021-09-15 | End: 2022-04-26

## 2021-09-15 RX ORDER — LEVOTHYROXINE SODIUM 0.15 MG/1
150 TABLET ORAL DAILY
Qty: 90 TABLET | Refills: 3 | Status: SHIPPED | OUTPATIENT
Start: 2021-09-15 | End: 2022-04-27

## 2021-09-25 ENCOUNTER — APPOINTMENT (OUTPATIENT)
Dept: MRI IMAGING | Facility: HOSPITAL | Age: 46
End: 2021-09-25

## 2021-10-07 ENCOUNTER — OFFICE VISIT (OUTPATIENT)
Dept: FAMILY MEDICINE CLINIC | Facility: CLINIC | Age: 46
End: 2021-10-07

## 2021-10-07 VITALS
WEIGHT: 178 LBS | HEART RATE: 79 BPM | TEMPERATURE: 98.2 F | BODY MASS INDEX: 27.94 KG/M2 | OXYGEN SATURATION: 99 % | HEIGHT: 67 IN | SYSTOLIC BLOOD PRESSURE: 119 MMHG | DIASTOLIC BLOOD PRESSURE: 81 MMHG

## 2021-10-07 DIAGNOSIS — Z12.31 ENCOUNTER FOR SCREENING MAMMOGRAM FOR BREAST CANCER: ICD-10-CM

## 2021-10-07 DIAGNOSIS — N63.0 FIBROUS BREAST LUMPS: ICD-10-CM

## 2021-10-07 DIAGNOSIS — Z12.39 ENCOUNTER FOR SCREENING BREAST EXAMINATION: ICD-10-CM

## 2021-10-07 DIAGNOSIS — Z23 NEED FOR INFLUENZA VACCINATION: Primary | ICD-10-CM

## 2021-10-07 PROCEDURE — 90686 IIV4 VACC NO PRSV 0.5 ML IM: CPT | Performed by: PHYSICIAN ASSISTANT

## 2021-10-07 PROCEDURE — 90471 IMMUNIZATION ADMIN: CPT | Performed by: PHYSICIAN ASSISTANT

## 2021-10-07 PROCEDURE — 99213 OFFICE O/P EST LOW 20 MIN: CPT | Performed by: PHYSICIAN ASSISTANT

## 2021-10-07 NOTE — PROGRESS NOTES
"Subjective   Cindy Hawk is a 46 y.o. female.     History of Present Illness   Cindy Hawk 46 y.o. female /81   Pulse 79   Temp 98.2 °F (36.8 °C)   Ht 170.2 cm (67.01\")   Wt 80.7 kg (178 lb)   LMP 2021   SpO2 99%   BMI 27.87 kg/m²  who presents today for breast lumpy area X few weeks,Patient has been erroneously marked as diabetic. Based on the available clinical information, she does not have diabetes and should therefore be excluded from diabetic health maintenance and quality measures for the remainder of the reporting period. Pain or change.   she has a history of   Patient Active Problem List   Diagnosis   • Vitamin B12 deficiency   • Vitamin D deficiency disease   • Acquired hypothyroidism   • Arnold-Chiari malformation, type I (HCC)   • Neck pain   • Peripheral neuropathy   • Headache   • Anxiety   • Hyperlipidemia   • Arthritis   • History of Chiari malformation   • Other headache syndrome   • Chronic bilateral low back pain with left-sided sciatica   • Subjective visual disturbance   • Anterior basement membrane dystrophy   • Hypothyroidism   • Arnold-Chiari malformation, type I (HCC)   • Anxiety   .      ;  No breast feeding  First child age 21  No fam hx breast cancer  Left breast bx ---clogged milk duct    Has regular periods  Stop smoking  Prior notes  Lumbar pain  The pain is described as burning and occurs constantly. The symptoms are progressive. Symptoms are exacerbated by Sitting, definitely standing, walking, laying down and less she is on her side with a pillow between her knees. Factors which relieve the pain include nothing helps. Other associated symptoms include numbness, denies pain radiating down right leg, denies motor loss, denies sensory loss and denies N/T in right leg. Previous history of symptoms: Never like this in the past. Treatment efforts have included heat, ice, movement and Ultram and no help; Zanaflex and no help , without " relief.     The following portions of the patient's history were reviewed and updated as appropriate: allergies, current medications, past family history, past medical history, past social history, past surgical history and problem list.    Review of Systems   Constitutional: Negative for activity change, appetite change, fever and unexpected weight change.   HENT: Positive for tinnitus and trouble swallowing. Negative for nosebleeds.    Eyes: Negative for pain and visual disturbance.   Respiratory: Positive for choking. Negative for chest tightness, shortness of breath, wheezing and stridor.    Cardiovascular: Negative for chest pain and palpitations.   Gastrointestinal: Positive for nausea. Negative for abdominal pain and blood in stool.   Endocrine: Negative.  Negative for polydipsia.   Genitourinary: Negative for difficulty urinating, genital sores and hematuria.   Musculoskeletal: Positive for arthralgias, back pain, gait problem, neck pain and neck stiffness. Negative for joint swelling.   Skin: Negative for color change and rash.   Allergic/Immunologic: Negative.  Negative for immunocompromised state.   Neurological: Positive for dizziness, tremors, weakness, light-headedness, numbness and headaches. Negative for seizures, syncope, facial asymmetry and speech difficulty.   Hematological: Negative for adenopathy.   Psychiatric/Behavioral: Negative for agitation, behavioral problems, confusion, self-injury and suicidal ideas.   All other systems reviewed and are negative.      Objective   Physical Exam  Vitals and nursing note reviewed.   Constitutional:       General: She is not in acute distress.     Appearance: She is well-developed. She is not ill-appearing or toxic-appearing.   HENT:      Head: Normocephalic.      Right Ear: External ear normal.      Left Ear: External ear normal.      Nose: Nose normal.      Mouth/Throat:      Pharynx: Oropharynx is clear.   Eyes:      General: No scleral icterus.      Conjunctiva/sclera: Conjunctivae normal.      Pupils: Pupils are equal, round, and reactive to light.   Neck:      Thyroid: No thyromegaly.   Cardiovascular:      Heart sounds: No murmur heard.     Pulmonary:      Effort: No respiratory distress.   Chest:      Chest wall: No mass or tenderness.      Breasts: Matthew Score is 5. Breasts are asymmetrical (R>L).         Right: Normal. No inverted nipple, skin change or tenderness.         Left: Normal. No inverted nipple, skin change or tenderness.      Comments: No mass, has fibrous tissue QUO bilat; not sore; need screen mammo and will get Dx us bilat  Musculoskeletal:         General: No deformity. Normal range of motion.      Cervical back: Normal range of motion.   Lymphadenopathy:      Upper Body:      Right upper body: No axillary adenopathy.      Left upper body: No axillary adenopathy.   Skin:     General: Skin is warm and dry.      Findings: No rash.   Neurological:      General: No focal deficit present.      Mental Status: She is alert and oriented to person, place, and time. Mental status is at baseline.   Psychiatric:         Mood and Affect: Mood normal.         Behavior: Behavior normal.         Thought Content: Thought content normal.         Judgment: Judgment normal.         Assessment/Plan   Diagnoses and all orders for this visit:    1. Need for influenza vaccination (Primary)  -     FluLaval/Fluarix (VFC) >6 Months    2. Encounter for screening breast examination      Stop smoking  Breast exam today--fibrous breasts UOQ; update screening mammogram and get bilat us for fibrous UOQ  Watch caffeine  Good bra

## 2021-10-12 ENCOUNTER — APPOINTMENT (OUTPATIENT)
Dept: MRI IMAGING | Facility: HOSPITAL | Age: 46
End: 2021-10-12

## 2021-10-13 ENCOUNTER — APPOINTMENT (OUTPATIENT)
Dept: MRI IMAGING | Facility: HOSPITAL | Age: 46
End: 2021-10-13

## 2021-11-13 DIAGNOSIS — G62.9 PERIPHERAL POLYNEUROPATHY: ICD-10-CM

## 2021-11-13 DIAGNOSIS — H53.10 SUBJECTIVE VISUAL DISTURBANCE: ICD-10-CM

## 2021-11-13 DIAGNOSIS — G44.89 OTHER HEADACHE SYNDROME: ICD-10-CM

## 2021-11-13 DIAGNOSIS — H53.8 BLURRED VISION: ICD-10-CM

## 2021-11-13 DIAGNOSIS — R26.89 POOR BALANCE: ICD-10-CM

## 2021-11-13 DIAGNOSIS — G93.5 ARNOLD-CHIARI MALFORMATION, TYPE I (HCC): Primary | ICD-10-CM

## 2021-11-15 ENCOUNTER — APPOINTMENT (OUTPATIENT)
Dept: MRI IMAGING | Facility: HOSPITAL | Age: 46
End: 2021-11-15

## 2021-11-15 ENCOUNTER — HOSPITAL ENCOUNTER (OUTPATIENT)
Dept: MRI IMAGING | Facility: HOSPITAL | Age: 46
End: 2021-11-15

## 2021-12-09 NOTE — ED TRIAGE NOTES
Patient c/o right lower abdominal pain and right flank pain with vomiting x30 minutes PTA   Skin normal color for race, warm, dry and intact. No evidence of rash.

## 2021-12-10 ENCOUNTER — HOSPITAL ENCOUNTER (OUTPATIENT)
Dept: MRI IMAGING | Facility: HOSPITAL | Age: 46
Discharge: HOME | End: 2021-12-10

## 2021-12-10 DIAGNOSIS — E03.9 ACQUIRED HYPOTHYROIDISM: ICD-10-CM

## 2021-12-10 DIAGNOSIS — G44.89 OTHER HEADACHE SYNDROME: ICD-10-CM

## 2021-12-10 DIAGNOSIS — E55.9 VITAMIN D DEFICIENCY DISEASE: ICD-10-CM

## 2021-12-10 DIAGNOSIS — E53.8 VITAMIN B12 DEFICIENCY: ICD-10-CM

## 2021-12-10 DIAGNOSIS — H53.10 SUBJECTIVE VISUAL DISTURBANCE: ICD-10-CM

## 2021-12-10 DIAGNOSIS — R26.89 POOR BALANCE: ICD-10-CM

## 2021-12-10 DIAGNOSIS — Z86.69 HISTORY OF CHIARI MALFORMATION: ICD-10-CM

## 2021-12-10 DIAGNOSIS — H53.8 BLURRED VISION: ICD-10-CM

## 2021-12-10 DIAGNOSIS — G62.9 PERIPHERAL POLYNEUROPATHY: ICD-10-CM

## 2021-12-10 DIAGNOSIS — M54.2 NECK PAIN: ICD-10-CM

## 2021-12-10 DIAGNOSIS — F41.9 ANXIETY: ICD-10-CM

## 2021-12-10 DIAGNOSIS — R42 DIZZINESS: ICD-10-CM

## 2021-12-10 DIAGNOSIS — E78.2 MIXED HYPERLIPIDEMIA: ICD-10-CM

## 2021-12-10 DIAGNOSIS — G93.5 ARNOLD-CHIARI MALFORMATION, TYPE I (HCC): ICD-10-CM

## 2021-12-10 DIAGNOSIS — R51.9 CHRONIC DAILY HEADACHE: ICD-10-CM

## 2021-12-10 PROCEDURE — 0 GADOBENATE DIMEGLUMINE 529 MG/ML SOLUTION: Performed by: PHYSICIAN ASSISTANT

## 2021-12-10 PROCEDURE — A9577 INJ MULTIHANCE: HCPCS | Performed by: PHYSICIAN ASSISTANT

## 2021-12-10 PROCEDURE — 72156 MRI NECK SPINE W/O & W/DYE: CPT

## 2021-12-10 PROCEDURE — 70553 MRI BRAIN STEM W/O & W/DYE: CPT

## 2021-12-10 RX ADMIN — GADOBENATE DIMEGLUMINE 16 ML: 529 INJECTION, SOLUTION INTRAVENOUS at 09:50

## 2021-12-21 ENCOUNTER — APPOINTMENT (OUTPATIENT)
Dept: MRI IMAGING | Facility: HOSPITAL | Age: 46
End: 2021-12-21

## 2021-12-21 ENCOUNTER — TELEMEDICINE (OUTPATIENT)
Dept: FAMILY MEDICINE CLINIC | Facility: TELEHEALTH | Age: 46
End: 2021-12-21

## 2021-12-21 DIAGNOSIS — J02.9 PHARYNGITIS, UNSPECIFIED ETIOLOGY: Primary | ICD-10-CM

## 2021-12-21 PROCEDURE — 99202 OFFICE O/P NEW SF 15 MIN: CPT | Performed by: NURSE PRACTITIONER

## 2021-12-21 RX ORDER — AMOXICILLIN 500 MG/1
500 CAPSULE ORAL 2 TIMES DAILY
Qty: 20 CAPSULE | Refills: 0 | Status: SHIPPED | OUTPATIENT
Start: 2021-12-21 | End: 2021-12-31

## 2021-12-21 NOTE — PROGRESS NOTES
Subjective   Cindy Hawk is a 46 y.o. female.     She has a sore and swollen throat, headache. These symptoms started last night. Her brother-in-law and nieces and all have strep. Rapid covid test negative as of last night. She says her  looked in her throat and saw white.    Sore Throat   This is a new problem. The current episode started yesterday. The problem has been rapidly worsening. There has been no fever. Associated symptoms include congestion.        The following portions of the patient's history were reviewed and updated as appropriate: allergies, current medications, past family history, past medical history, past social history, past surgical history, and problem list.    Review of Systems   Constitutional: Negative for fever.   HENT: Positive for congestion and sore throat.    Respiratory: Negative.    Gastrointestinal: Negative.    Musculoskeletal: Negative.        Objective   Physical Exam  Constitutional:       General: She is not in acute distress.     Appearance: She is well-developed. She is not diaphoretic.   Pulmonary:      Effort: Pulmonary effort is normal.   Neurological:      Mental Status: She is alert and oriented to person, place, and time.   Psychiatric:         Behavior: Behavior normal.           Assessment/Plan   Diagnoses and all orders for this visit:    1. Pharyngitis, unspecified etiology (Primary)  -     amoxicillin (AMOXIL) 500 MG capsule; Take 1 capsule by mouth 2 (Two) Times a Day for 10 days.  Dispense: 20 capsule; Refill: 0                 The use of a video visit has been reviewed with the patient and verbal informed consent has been obtained. Myself and Cindy Hawk participated in this visit. The patient is located in [place], [place]. I am located in [place], [place]. Mychart and Zoom were utilized. I spent 11 minutes in the patient's chart for this visit.

## 2021-12-21 NOTE — PATIENT INSTRUCTIONS
Get a new toothbrush after you have been on the antibiotic for 24 hours. Tylenol and ibuprofen as needed for pain. Warm salt water gargles. Follow up in 2-3 days if no improvement or symptoms worsen.      Strep Throat, Adult  Strep throat is an infection in the throat that is caused by bacteria. It is common during the cold months of the year. It mostly affects children who are 5-15 years old. However, people of all ages can get it at any time of the year. This infection spreads from person to person (is contagious) through coughing, sneezing, or having close contact.  Your health care provider may use other names to describe the infection. It can be called tonsillitis (if there is swelling of the tonsils), or pharyngitis (if there is swelling at the back of the throat).  What are the causes?  This condition is caused by the Streptococcus pyogenes bacteria.  What increases the risk?  You are more likely to develop this condition if:  · You care for school-age children, or are around school-age children. Children are more likely to get strep throat and may spread it to others.  · You spend time in crowded places where the infection can spread easily.  · You have close contact with someone who has strep throat.  What are the signs or symptoms?  Symptoms of this condition include:  · Fever or chills.  · Redness, swelling, or pain in the tonsils or throat.  · Pain or difficulty when swallowing.  · White or yellow spots on the tonsils or throat.  · Tender glands in the neck and under the jaw.  · Bad smelling breath.  · Red rash all over the body. This is rare.  How is this diagnosed?  This condition is diagnosed by tests that check for the presence and the amount of bacteria that cause strep throat. They are:  · Rapid strep test. Your throat is swabbed and checked for the presence of bacteria. Results are usually ready in minutes.  · Throat culture test. Your throat is swabbed. The sample is placed in a cup that allows  infections to grow. Results are usually ready in 1 or 2 days.  How is this treated?  This condition may be treated with:  · Medicines that kill germs (antibiotics).  · Medicines that relieve pain or fever. These include:  ? Ibuprofen or acetaminophen.  ? Aspirin, only for patients who are over the age of 18.  ? Throat lozenges.  ? Throat sprays.  Follow these instructions at home:  Medicines    · Take over-the-counter and prescription medicines only as told by your health care provider.  · Take your antibiotic medicine as told by your health care provider. Do not stop taking the antibiotic even if you start to feel better.    Eating and drinking    · If you have trouble swallowing, try eating soft foods until your sore throat feels better.  · Drink enough fluid to keep your urine pale yellow.  · To help relieve pain, you may have:  ? Warm fluids, such as soup and tea.  ? Cold fluids, such as frozen desserts or popsicles.    General instructions  · Gargle with a salt-water mixture 3-4 times a day or as needed. To make a salt-water mixture, completely dissolve ½-1 tsp (3-6 g) of salt in 1 cup (237 mL) of warm water.  · Get plenty of rest.  · Stay home from work or school until you have been taking antibiotics for 24 hours.  · Avoid smoking or being around people who smoke.  · Keep all follow-up visits as told by your health care provider. This is important.  How is this prevented?    · Do not share food, drinking cups, or personal items that could cause the infection to spread to other people.  · Wash your hands well with soap and water, and make sure that all people in your house wash their hands well.  · Have family members tested if they have a sore throat or fever. They may need an antibiotic if they have strep throat.  Contact a health care provider if:  · The glands in your neck continue to get bigger.  · You develop a rash, cough, or earache.  · You cough up a thick mucus that is green, yellow-brown, or  bloody.  · You have pain or discomfort that does not get better with medicine.  · Your symptoms seem to be getting worse and not better.  · You have a fever.  Get help right away if:  · You have new symptoms, such as vomiting, severe headache, stiff or painful neck, chest pain, or shortness of breath.  · You have severe throat pain, drooling, or changes in your voice.  · You have swelling of the neck, or the skin on the neck becomes red and tender.  · You have signs of dehydration, such as tiredness (fatigue), dry mouth, and decreased urination.  · You become increasingly sleepy, or you cannot wake up completely.  · Your joints become red or painful.  Summary  · Strep throat is an infection in the throat that is caused by the Streptococcus pyogenes bacteria. This infection is spread from person to person (is contagious) through coughing, sneezing, or having close contact.  · Take your medicines, including antibiotics, as told by your health care provider. Do not stop taking the antibiotic even if you start to feel better.  · To prevent the spread of germs, wash your hands well with soap and water. Have others do the same. Do not share food, drinking cups, or personal items.  · Get help right away if you have new symptoms, such as vomiting, severe headache, stiff or painful neck, chest pain, or shortness of breath.  This information is not intended to replace advice given to you by your health care provider. Make sure you discuss any questions you have with your health care provider.  Document Revised: 03/06/2020 Document Reviewed: 03/06/2020  [employer] Patient Education © 2021 [employer]  Amoxicillin capsules or tablets  What is this medicine?  AMOXICILLIN (a mox i LINK in) is a penicillin antibiotic. It is used to treat certain kinds of bacterial infections. It will not work for colds, flu, or other viral infections.  This medicine may be used for other purposes; ask your health care provider or pharmacist if you  have questions.  COMMON BRAND NAME(S): Amoxil, Moxilin, Sumox, Trimox  What should I tell my health care provider before I take this medicine?  They need to know if you have any of these conditions:  · kidney disease  · an unusual or allergic reaction to amoxicillin, other penicillins, cephalosporin antibiotics, other medicines, foods, dyes, or preservatives  · pregnant or trying to get pregnant  · breast-feeding  How should I use this medicine?  Take this medicine by mouth with a glass of water. Follow the directions on your prescription label. You can take it with or without food. If it upsets your stomach, take it with food. Take your medicine at regular intervals. Do not take your medicine more often than directed. Take all of your medicine as directed even if you think you are better. Do not skip doses or stop your medicine early.  Talk to your pediatrician regarding the use of this medicine in children. While this drug may be prescribed for selected conditions, precautions do apply.  Overdosage: If you think you have taken too much of this medicine contact a poison control center or emergency room at once.  NOTE: This medicine is only for you. Do not share this medicine with others.  What if I miss a dose?  If you miss a dose, take it as soon as you can. If it is almost time for your next dose, take only that dose. Do not take double or extra doses.  What may interact with this medicine?  · allopurinol  · birth control pills  · certain antibiotics like chloramphenicol, erythromycin, sulfamethoxazole, tetracycline  · certain medicines that treat or prevent blood clots like warfarin  This list may not describe all possible interactions. Give your health care provider a list of all the medicines, herbs, non-prescription drugs, or dietary supplements you use. Also tell them if you smoke, drink alcohol, or use illegal drugs. Some items may interact with your medicine.  What should I watch for while using this  medicine?  Tell your health care professional if your symptoms do not start to get better or if they get worse.  Do not treat diarrhea with over the counter products. Contact your health care professional if you have diarrhea that lasts more than 2 days or if it is severe and watery.  If you have diabetes, you may get a false-positive result for sugar in your urine. Check with your health care professional.  Birth control may not work properly while you are taking this medicine. Talk to your health care professional about using an extra method of birth control.  This medicine may cause serious skin reactions. They can happen weeks to months after starting the medicine. Contact your health care provider right away if you notice fevers or flu-like symptoms with a rash. The rash may be red or purple and then turn into blisters or peeling of the skin. Or, you might notice a red rash with swelling of the face, lips or lymph nodes in your neck or under your arms.  What side effects may I notice from receiving this medicine?  Side effects that you should report to your doctor or health care professional as soon as possible:  · allergic reactions like skin rash, itching or hives, swelling of the face, lips, or tongue  · bloody or watery diarrhea  · breathing problems  · feeling faint; lightheaded, falls  · fever  · redness, blistering, peeling or loosening of the skin, including inside the mouth  · seizures  · signs and symptoms of kidney injury like trouble passing urine or change in the amount of urine  · signs and symptoms of liver injury like dark yellow or brown urine; general ill feeling or flu-like symptoms; light-colored stools; loss of appetite; nausea; right upper belly pain; unusually weak or tired; yellowing of the eyes or skin  · unusual bleeding or bruising  · unusually weak or tired  Side effects that usually do not require medical attention (report to your doctor or health care professional if they continue  or are bothersome):  · anxious  · confusion  · diarrhea  · dizziness  · headache  · nausea, vomiting  · stomach upset  · trouble sleeping  This list may not describe all possible side effects. Call your doctor for medical advice about side effects. You may report side effects to FDA at 2-635-LWQ-1837.  Where should I keep my medicine?  Keep out of the reach of children.  Store at room temperature between 20 and 25 degrees C (68 and 77 degrees F). Throw away any unused medicine after the expiration date.  NOTE: This sheet is a summary. It may not cover all possible information. If you have questions about this medicine, talk to your doctor, pharmacist, or health care provider.  © 2021 [employer] (2020-02-28 14:32:29)

## 2022-04-26 ENCOUNTER — OFFICE VISIT (OUTPATIENT)
Dept: FAMILY MEDICINE CLINIC | Facility: CLINIC | Age: 47
End: 2022-04-26

## 2022-04-26 VITALS
HEIGHT: 67 IN | TEMPERATURE: 97.3 F | DIASTOLIC BLOOD PRESSURE: 70 MMHG | SYSTOLIC BLOOD PRESSURE: 102 MMHG | OXYGEN SATURATION: 98 % | WEIGHT: 177 LBS | RESPIRATION RATE: 16 BRPM | HEART RATE: 85 BPM | BODY MASS INDEX: 27.78 KG/M2

## 2022-04-26 DIAGNOSIS — G44.229 CHRONIC TENSION-TYPE HEADACHE, NOT INTRACTABLE: ICD-10-CM

## 2022-04-26 DIAGNOSIS — M79.605 LUMBAR PAIN WITH RADIATION DOWN BOTH LEGS: ICD-10-CM

## 2022-04-26 DIAGNOSIS — E53.8 VITAMIN B12 DEFICIENCY: ICD-10-CM

## 2022-04-26 DIAGNOSIS — M54.2 NECK PAIN: ICD-10-CM

## 2022-04-26 DIAGNOSIS — Z86.69 HISTORY OF CHIARI MALFORMATION: ICD-10-CM

## 2022-04-26 DIAGNOSIS — E03.9 ACQUIRED HYPOTHYROIDISM: Primary | ICD-10-CM

## 2022-04-26 DIAGNOSIS — E78.2 MIXED HYPERLIPIDEMIA: ICD-10-CM

## 2022-04-26 DIAGNOSIS — M79.604 LUMBAR PAIN WITH RADIATION DOWN BOTH LEGS: ICD-10-CM

## 2022-04-26 DIAGNOSIS — E55.9 VITAMIN D DEFICIENCY DISEASE: ICD-10-CM

## 2022-04-26 DIAGNOSIS — G44.89 OTHER HEADACHE SYNDROME: ICD-10-CM

## 2022-04-26 DIAGNOSIS — M54.50 LUMBAR PAIN WITH RADIATION DOWN BOTH LEGS: ICD-10-CM

## 2022-04-26 DIAGNOSIS — R26.89 POOR BALANCE: ICD-10-CM

## 2022-04-26 DIAGNOSIS — G62.9 PERIPHERAL POLYNEUROPATHY: ICD-10-CM

## 2022-04-26 DIAGNOSIS — F41.9 ANXIETY: ICD-10-CM

## 2022-04-26 DIAGNOSIS — R51.9 CHRONIC DAILY HEADACHE: ICD-10-CM

## 2022-04-26 DIAGNOSIS — E53.8 LOW FOLIC ACID: ICD-10-CM

## 2022-04-26 PROCEDURE — 99214 OFFICE O/P EST MOD 30 MIN: CPT | Performed by: PHYSICIAN ASSISTANT

## 2022-04-26 RX ORDER — TRAMADOL HYDROCHLORIDE 50 MG/1
50 TABLET ORAL EVERY 6 HOURS PRN
Qty: 90 TABLET | Refills: 2 | Status: SHIPPED | OUTPATIENT
Start: 2022-04-26 | End: 2022-11-10 | Stop reason: SDUPTHER

## 2022-04-26 NOTE — PATIENT INSTRUCTIONS
Hypothyroidism    Hypothyroidism is when the thyroid gland does not make enough of certain hormones (it is underactive). The thyroid gland is a small gland located in the lower front part of the neck, just in front of the windpipe (trachea). This gland makes hormones that help control how the body uses food for energy (metabolism) as well as how the heart and brain function. These hormones also play a role in keeping your bones strong. When the thyroid is underactive, it produces too little of the hormones thyroxine (T4) and triiodothyronine (T3).  What are the causes?  This condition may be caused by:  Hashimoto's disease. This is a disease in which the body's disease-fighting system (immune system) attacks the thyroid gland. This is the most common cause.  Viral infections.  Pregnancy.  Certain medicines.  Birth defects.  Past radiation treatments to the head or neck for cancer.  Past treatment with radioactive iodine.  Past exposure to radiation in the environment.  Past surgical removal of part or all of the thyroid.  Problems with a gland in the center of the brain (pituitary gland).  Lack of enough iodine in the diet.  What increases the risk?  You are more likely to develop this condition if:  You are female.  You have a family history of thyroid conditions.  You use a medicine called lithium.  You take medicines that affect the immune system (immunosuppressants).  What are the signs or symptoms?  Symptoms of this condition include:  Feeling as though you have no energy (lethargy).  Not being able to tolerate cold.  Weight gain that is not explained by a change in diet or exercise habits.  Lack of appetite.  Dry skin.  Coarse hair.  Menstrual irregularity.  Slowing of thought processes.  Constipation.  Sadness or depression.  How is this diagnosed?  This condition may be diagnosed based on:  Your symptoms, your medical history, and a physical exam.  Blood tests.  You may also have imaging tests, such as an  ultrasound or MRI.  How is this treated?  This condition is treated with medicine that replaces the thyroid hormones that your body does not make. After you begin treatment, it may take several weeks for symptoms to go away.  Follow these instructions at home:  Take over-the-counter and prescription medicines only as told by your health care provider.  If you start taking any new medicines, tell your health care provider.  Keep all follow-up visits as told by your health care provider. This is important.  As your condition improves, your dosage of thyroid hormone medicine may change.  You will need to have blood tests regularly so that your health care provider can monitor your condition.  Contact a health care provider if:  Your symptoms do not get better with treatment.  You are taking thyroid hormone replacement medicine and you:  Sweat a lot.  Have tremors.  Feel anxious.  Lose weight rapidly.  Cannot tolerate heat.  Have emotional swings.  Have diarrhea.  Feel weak.  Get help right away if you have:  Chest pain.  An irregular heartbeat.  A rapid heartbeat.  Difficulty breathing.  Summary  Hypothyroidism is when the thyroid gland does not make enough of certain hormones (it is underactive).  When the thyroid is underactive, it produces too little of the hormones thyroxine (T4) and triiodothyronine (T3).  The most common cause is Hashimoto's disease, a disease in which the body's disease-fighting system (immune system) attacks the thyroid gland. The condition can also be caused by viral infections, medicine, pregnancy, or past radiation treatment to the head or neck.  Symptoms may include weight gain, dry skin, constipation, feeling as though you do not have energy, and not being able to tolerate cold.  This condition is treated with medicine to replace the thyroid hormones that your body does not make.  This information is not intended to replace advice given to you by your health care provider. Make sure you  discuss any questions you have with your health care provider.  Document Revised: 09/17/2021 Document Reviewed: 09/02/2021  [employer] Patient Education © 2021 [employer]

## 2022-04-26 NOTE — PROGRESS NOTES
"Subjective   Cindy Hawk is a 46 y.o. female.     History of Present Illness    Since the last visit, she has overall felt tired.  She has Hyperlipidemia and working on this with diet and exercise, Hypothyroidism and must update labs to continue treatment, Seasonal allergies and doing well on their medication  and Vitamin D deficiency and labs are at goal >30 ng/mL.  she has been compliant with current medications have reviewed them.  The patient denies medication side effects.  Will refill medications. /70   Pulse 85   Temp 97.3 °F (36.3 °C)   Resp 16   Ht 170.2 cm (67.01\")   Wt 80.3 kg (177 lb)   SpO2 98%   BMI 27.71 kg/m²     Results for orders placed or performed in visit on 09/14/21   Comprehensive metabolic panel    Specimen: Blood   Result Value Ref Range    Glucose 89 65 - 99 mg/dL    BUN 5 (L) 6 - 20 mg/dL    Creatinine 0.97 0.57 - 1.00 mg/dL    eGFR Non African Am 62 >60 mL/min/1.73    eGFR African Am 76 >60 mL/min/1.73    BUN/Creatinine Ratio 5.2 (L) 7.0 - 25.0    Sodium 137 136 - 145 mmol/L    Potassium 4.5 3.5 - 5.2 mmol/L    Chloride 98 98 - 107 mmol/L    Total CO2 25.2 22.0 - 29.0 mmol/L    Calcium 9.7 8.6 - 10.5 mg/dL    Total Protein 7.2 6.0 - 8.5 g/dL    Albumin 4.90 3.50 - 5.20 g/dL    Globulin 2.3 gm/dL    A/G Ratio 2.1 g/dL    Total Bilirubin 0.5 0.0 - 1.2 mg/dL    Alkaline Phosphatase 80 39 - 117 U/L    AST (SGOT) 20 1 - 32 U/L    ALT (SGPT) 20 1 - 33 U/L   Lipid panel    Specimen: Blood   Result Value Ref Range    Total Cholesterol 272 (H) 0 - 200 mg/dL    Triglycerides 220 (H) 0 - 150 mg/dL    HDL Cholesterol 47 40 - 60 mg/dL    VLDL Cholesterol Ron 42 (H) 5 - 40 mg/dL    LDL Chol Calc (NIH) 183 (H) 0 - 100 mg/dL   TSH    Specimen: Blood   Result Value Ref Range    TSH 24.400 (H) 0.270 - 4.200 uIU/mL   Hemoglobin A1c    Specimen: Blood   Result Value Ref Range    Hemoglobin A1C 5.50 4.80 - 5.60 %   Vitamin D 25 Hydroxy    Specimen: Blood   Result Value Ref Range    25 " "Hydroxy, Vitamin D 38.2 30.0 - 100.0 ng/mL   Vitamin B12    Specimen: Blood   Result Value Ref Range    Vitamin B-12 872 211 - 946 pg/mL   Folate    Specimen: Blood   Result Value Ref Range    Folate 7.45 4.78 - 24.20 ng/mL   T3, Free    Specimen: Blood   Result Value Ref Range    T3, Free 2.3 2.0 - 4.4 pg/mL   T4, Free    Specimen: Blood   Result Value Ref Range    Free T4 1.12 0.93 - 1.70 ng/dL   Manual Differential   Result Value Ref Range    Neutrophil Rel % 66.7 42.7 - 76.0 %    Lymphocyte Rel % 24.0 19.6 - 45.3 %    Monocyte Rel % 7.3 5.0 - 12.0 %    Eosinophil Rel % 1.0 0.3 - 6.2 %    Basophil Rel % 1.0 0.0 - 1.5 %    Neutrophils Absolute 5.33 1.70 - 7.00 10*3/mm3    Lymphocytes Absolute 1.92 0.70 - 3.10 10*3/mm3    Monocytes Absolute 0.58 0.10 - 0.90 10*3/mm3    Eosinophil Abs 0.08 0.00 - 0.40 10*3/mm3    Basophils Absolute 0.08 0.00 - 0.20 10*3/mm3    Differential Comment Comment     Comment Comment     Plt Comment Comment    CBC and Differential    Specimen: Blood   Result Value Ref Range    WBC 7.99 3.40 - 10.80 10*3/mm3    RBC 4.92 3.77 - 5.28 10*6/mm3    Hemoglobin 15.0 12.0 - 15.9 g/dL    Hematocrit 45.5 34.0 - 46.6 %    MCV 92.5 79.0 - 97.0 fL    MCH 30.5 26.6 - 33.0 pg    MCHC 33.0 31.5 - 35.7 g/dL    RDW 13.1 12.3 - 15.4 %    Platelets 253 140 - 450 10*3/mm3    Neutrophil Rel % CANCELED     Lymphocyte Rel % CANCELED     Monocyte Rel % CANCELED     Eosinophil Rel % CANCELED     Lymphocytes Absolute CANCELED     Eosinophils Absolute CANCELED     Basophils Absolute CANCELED    Cindy B Carine female 46 y.o., /70   Pulse 85   Temp 97.3 °F (36.3 °C)   Resp 16   Ht 170.2 cm (67.01\")   Wt 80.3 kg (177 lb)   SpO2 98%   BMI 27.71 kg/m²   who presents today for follow up of Anxiety.  She reports medication is working well, patient desires to continue on Rx, and needs refill. Onset of symptoms was approximately several years ago. She denies current suicidal and homicidal ideation. Risk factors " are family history of anxiety and or depression, chronic pain and lifestyle of multiple roles.  Previous treatment includes current Rx. She complains of the following medication side effects:none. The patient declines to go to counseling..  Zoloft dose working well and will continue 200 mg      Remains on tramadol for her chronic daily headache and cervical spine pain.     Still has pain in lumbar spine ----does get weakness and pain.  Pain constant; no injury.      My prior notes:  10-7-21  The pain is described as burning and occurs constantly. The symptoms are progressive. Symptoms are exacerbated by Sitting, definitely standing, walking, laying down and less she is on her side with a pillow between her knees. Factors which relieve the pain include nothing helps. Other associated symptoms include numbness, denies pain radiating down right leg, denies motor loss, denies sensory loss and denies N/T in right leg. Previous history of symptoms: Never like this in the past. Treatment efforts have included heat, ice, movement and Ultram and no help; Zanaflex and no help , without relief.        Has had vision checked  Having blurred vision, blind spots, dizzy, off balance  She is taking Y03---khmr labs--if still low---change to injections  Numbness all over is worse ????MS??  Had brain MRI and no MS; see neuro  12-10-21  Still having h/a's;  Concern with all over numbness.  Still daily h/a's.  Pressure in neck.  Some tinnitus.  Lack of concentration.  The patient has read and signed the [facility] Controlled Substance Contract.  I will continue to see patient for regular follow up appointments.  They are well controlled on their medication.  LISA has been reviewed by me and is updated every 3 months. The patient is aware of the potential for addiction and dependence.  Saw DR Espinoza in 2019---noted her surgical hx and reported cerebral tonsils ok.  Not surgical at that time and see neuro    9-14-21  Vitamin  levels are normal.  Kidney and renal functions are normal.  A1c is normal as well with no prediabetes diagnosis.  Cholesterol is high your LDL is above 160 and would want you to consider starting a statin for treatment.  Let me know what you think?  Your TSH is still elevated but your free T3 and free T4 are in range but at the lower range--I do want increased thyroid dose again with follow-up labs planned in 6 weeks.  We will also repeat your CBC-blood count.  This is a nonspecific finding and that with the poikilocytes--just let me repeated it.  Other labs okay----    I do have follow-up labs ordered and need to check her lipids and likely restart the Crestor.  Definitely need update on the thyroid and did change thyroid dose in September    The following portions of the patient's history were reviewed and updated as appropriate: allergies, current medications, past family history, past medical history, past social history, past surgical history and problem list.    Review of Systems   Constitutional: Positive for diaphoresis and fatigue. Negative for activity change, appetite change and unexpected weight change.   HENT: Positive for tinnitus. Negative for nosebleeds and trouble swallowing.    Eyes: Negative for pain and visual disturbance.   Respiratory: Negative for chest tightness, shortness of breath and wheezing.    Cardiovascular: Negative for chest pain and palpitations.   Gastrointestinal: Positive for nausea. Negative for abdominal pain and blood in stool.   Endocrine: Negative.    Genitourinary: Negative for difficulty urinating and hematuria.   Musculoskeletal: Positive for arthralgias, back pain, gait problem, joint swelling, myalgias and neck pain.   Skin: Negative for color change and rash.   Allergic/Immunologic: Negative.    Neurological: Positive for dizziness, seizures, speech difficulty, weakness, light-headedness, numbness and headaches. Negative for syncope.   Hematological: Negative for  adenopathy.   Psychiatric/Behavioral: Positive for decreased concentration. Negative for agitation and confusion. The patient is nervous/anxious.    All other systems reviewed and are negative.      Objective   Physical Exam  Vitals and nursing note reviewed.   Constitutional:       General: She is not in acute distress.     Appearance: She is well-developed. She is not ill-appearing or toxic-appearing.   HENT:      Head: Normocephalic.      Right Ear: External ear normal.      Left Ear: External ear normal.      Nose: Nose normal.      Mouth/Throat:      Pharynx: Oropharynx is clear.   Eyes:      General: No scleral icterus.     Conjunctiva/sclera: Conjunctivae normal.      Pupils: Pupils are equal, round, and reactive to light.   Neck:      Thyroid: No thyromegaly.   Cardiovascular:      Rate and Rhythm: Normal rate and regular rhythm.      Heart sounds: Normal heart sounds. No murmur heard.  Pulmonary:      Effort: Pulmonary effort is normal. No respiratory distress.      Breath sounds: Normal breath sounds. No rales.   Musculoskeletal:         General: Tenderness present. No deformity. Normal range of motion.      Cervical back: Normal range of motion and neck supple.      Comments: ROM lumbar pain  Tender in left piriformis muscle   Skin:     General: Skin is warm and dry.      Coloration: Skin is not jaundiced.      Findings: No rash.      Comments: Pea size node anterior axilla right--sore, not red   Neurological:      General: No focal deficit present.      Mental Status: She is alert and oriented to person, place, and time. Mental status is at baseline.      Sensory: Sensory deficit present.      Coordination: Coordination abnormal.      Gait: Gait abnormal.   Psychiatric:         Mood and Affect: Mood normal.         Behavior: Behavior normal.         Thought Content: Thought content normal.         Judgment: Judgment normal.         Assessment/Plan   Diagnoses and all orders for this visit:    1. Acquired  hypothyroidism (Primary)    2. Vitamin B12 deficiency    3. Vitamin D deficiency disease    4. Peripheral polyneuropathy    5. Mixed hyperlipidemia    6. Anxiety    7. Chronic daily headache    8. Poor balance      Need to update B12 and see if the oral over-the-counter is working her low B12  Still want her to see neurology for the above note chronic headache and dizziness blurred vision off balance  Do have mammogram and breast ultrasound ordered and call scheduling to set this up  Stop smoking  Low back pain 9 months progressing and will get MRI also bilateral leg concern with radicular symptoms.  She stopped Crestor---no reason.   Plan, Cindy Hawk, was seen today.  she was seen for Hyperlipidemia and will work on this with diet and exercise, Hypothyroidism with abnormal labs and new medication regimen and Vitamin D deficiency and will update labs .  I do have a referral in place for Dr. Bonilla for the neurologist

## 2022-04-27 ENCOUNTER — TELEPHONE (OUTPATIENT)
Dept: FAMILY MEDICINE CLINIC | Facility: CLINIC | Age: 47
End: 2022-04-27

## 2022-04-27 DIAGNOSIS — N63.0 FIBROUS BREAST LUMPS: ICD-10-CM

## 2022-04-27 DIAGNOSIS — Z12.31 ENCOUNTER FOR SCREENING MAMMOGRAM FOR BREAST CANCER: Primary | ICD-10-CM

## 2022-04-27 PROBLEM — E53.8 LOW FOLIC ACID: Status: ACTIVE | Noted: 2022-04-27

## 2022-04-27 LAB
ALBUMIN SERPL-MCNC: 4.2 G/DL (ref 3.8–4.8)
ALBUMIN/GLOB SERPL: 1.8 {RATIO} (ref 1.2–2.2)
ALP SERPL-CCNC: 81 IU/L (ref 44–121)
ALT SERPL-CCNC: 10 IU/L (ref 0–32)
AST SERPL-CCNC: 13 IU/L (ref 0–40)
BASOPHILS # BLD AUTO: 0.1 X10E3/UL (ref 0–0.2)
BASOPHILS NFR BLD AUTO: 1 %
BILIRUB SERPL-MCNC: 0.4 MG/DL (ref 0–1.2)
BUN SERPL-MCNC: 11 MG/DL (ref 6–24)
BUN/CREAT SERPL: 12 (ref 9–23)
CALCIUM SERPL-MCNC: 9.1 MG/DL (ref 8.7–10.2)
CHLORIDE SERPL-SCNC: 103 MMOL/L (ref 96–106)
CHOLEST SERPL-MCNC: 230 MG/DL (ref 100–199)
CO2 SERPL-SCNC: 21 MMOL/L (ref 20–29)
CREAT SERPL-MCNC: 0.89 MG/DL (ref 0.57–1)
EGFRCR SERPLBLD CKD-EPI 2021: 82 ML/MIN/1.73
EOSINOPHIL # BLD AUTO: 0.2 X10E3/UL (ref 0–0.4)
EOSINOPHIL NFR BLD AUTO: 3 %
ERYTHROCYTE [DISTWIDTH] IN BLOOD BY AUTOMATED COUNT: 12.3 % (ref 11.7–15.4)
FOLATE SERPL-MCNC: 4.8 NG/ML
GLOBULIN SER CALC-MCNC: 2.4 G/DL (ref 1.5–4.5)
GLUCOSE SERPL-MCNC: 91 MG/DL (ref 65–99)
HCT VFR BLD AUTO: 38.9 % (ref 34–46.6)
HDLC SERPL-MCNC: 44 MG/DL
HGB BLD-MCNC: 13.4 G/DL (ref 11.1–15.9)
IMM GRANULOCYTES # BLD AUTO: 0 X10E3/UL (ref 0–0.1)
IMM GRANULOCYTES NFR BLD AUTO: 0 %
LDLC SERPL CALC-MCNC: 165 MG/DL (ref 0–99)
LYMPHOCYTES # BLD AUTO: 2.5 X10E3/UL (ref 0.7–3.1)
LYMPHOCYTES NFR BLD AUTO: 26 %
MCH RBC QN AUTO: 30.6 PG (ref 26.6–33)
MCHC RBC AUTO-ENTMCNC: 34.4 G/DL (ref 31.5–35.7)
MCV RBC AUTO: 89 FL (ref 79–97)
MONOCYTES # BLD AUTO: 0.5 X10E3/UL (ref 0.1–0.9)
MONOCYTES NFR BLD AUTO: 6 %
NEUTROPHILS # BLD AUTO: 6.1 X10E3/UL (ref 1.4–7)
NEUTROPHILS NFR BLD AUTO: 64 %
PLATELET # BLD AUTO: 314 X10E3/UL (ref 150–450)
POTASSIUM SERPL-SCNC: 4.4 MMOL/L (ref 3.5–5.2)
PROT SERPL-MCNC: 6.6 G/DL (ref 6–8.5)
RBC # BLD AUTO: 4.38 X10E6/UL (ref 3.77–5.28)
SODIUM SERPL-SCNC: 139 MMOL/L (ref 134–144)
T3FREE SERPL-MCNC: 2.2 PG/ML (ref 2–4.4)
T4 FREE SERPL-MCNC: 1.26 NG/DL (ref 0.82–1.77)
TRIGL SERPL-MCNC: 114 MG/DL (ref 0–149)
TSH SERPL DL<=0.005 MIU/L-ACNC: 12.9 UIU/ML (ref 0.45–4.5)
VIT B12 SERPL-MCNC: 643 PG/ML (ref 232–1245)
VLDLC SERPL CALC-MCNC: 21 MG/DL (ref 5–40)
WBC # BLD AUTO: 9.4 X10E3/UL (ref 3.4–10.8)

## 2022-04-27 RX ORDER — FOLIC ACID 1 MG/1
1 TABLET ORAL DAILY
Qty: 90 TABLET | Refills: 3 | Status: SHIPPED | OUTPATIENT
Start: 2022-04-27

## 2022-04-27 RX ORDER — LEVOTHYROXINE SODIUM 175 UG/1
175 TABLET ORAL DAILY
Qty: 90 TABLET | Refills: 3 | Status: SHIPPED | OUTPATIENT
Start: 2022-04-27

## 2022-04-27 NOTE — TELEPHONE ENCOUNTER
----- Message from Cindy Hawk sent at 4/27/2022  8:43 AM EDT -----  Regarding: Mammogram   Scheduling sent a new order request to you. They said it needs to be for diagnostic not screening. They’ll call me to schedule when they get the updated order.   Lumbar mri is scheduled for Monday. I got Cathy an earlier appointment with you on Tuesday. Calling cardiology at 9:00 for Cathy as well.   Thank you Lyly!!  I don’t know what we’d do without you!

## 2022-05-02 ENCOUNTER — APPOINTMENT (OUTPATIENT)
Dept: MRI IMAGING | Facility: HOSPITAL | Age: 47
End: 2022-05-02

## 2022-05-31 ENCOUNTER — APPOINTMENT (OUTPATIENT)
Dept: MRI IMAGING | Facility: HOSPITAL | Age: 47
End: 2022-05-31

## 2022-06-17 ENCOUNTER — APPOINTMENT (OUTPATIENT)
Dept: MRI IMAGING | Facility: HOSPITAL | Age: 47
End: 2022-06-17

## 2022-07-05 ENCOUNTER — HOSPITAL ENCOUNTER (OUTPATIENT)
Dept: MRI IMAGING | Facility: HOSPITAL | Age: 47
End: 2022-07-05

## 2022-07-26 ENCOUNTER — APPOINTMENT (OUTPATIENT)
Dept: MRI IMAGING | Facility: HOSPITAL | Age: 47
End: 2022-07-26

## 2022-10-05 RX ORDER — SERTRALINE HYDROCHLORIDE 100 MG/1
TABLET, FILM COATED ORAL
Qty: 60 TABLET | Refills: 0 | Status: SHIPPED | OUTPATIENT
Start: 2022-10-05 | End: 2022-10-28

## 2022-10-28 RX ORDER — SERTRALINE HYDROCHLORIDE 100 MG/1
TABLET, FILM COATED ORAL
Qty: 60 TABLET | Refills: 0 | Status: SHIPPED | OUTPATIENT
Start: 2022-10-28 | End: 2023-03-29 | Stop reason: SDUPTHER

## 2022-11-10 ENCOUNTER — OFFICE VISIT (OUTPATIENT)
Dept: FAMILY MEDICINE CLINIC | Facility: CLINIC | Age: 47
End: 2022-11-10

## 2022-11-10 VITALS
TEMPERATURE: 97.5 F | RESPIRATION RATE: 16 BRPM | BODY MASS INDEX: 27.78 KG/M2 | SYSTOLIC BLOOD PRESSURE: 100 MMHG | HEIGHT: 67 IN | DIASTOLIC BLOOD PRESSURE: 60 MMHG | HEART RATE: 74 BPM | WEIGHT: 177 LBS | OXYGEN SATURATION: 96 %

## 2022-11-10 DIAGNOSIS — H53.9 VISION DISTURBANCE: ICD-10-CM

## 2022-11-10 DIAGNOSIS — E55.9 VITAMIN D DEFICIENCY DISEASE: ICD-10-CM

## 2022-11-10 DIAGNOSIS — G44.229 CHRONIC TENSION-TYPE HEADACHE, NOT INTRACTABLE: ICD-10-CM

## 2022-11-10 DIAGNOSIS — G44.89 OTHER HEADACHE SYNDROME: ICD-10-CM

## 2022-11-10 DIAGNOSIS — M54.2 NECK PAIN: ICD-10-CM

## 2022-11-10 DIAGNOSIS — E03.9 ACQUIRED HYPOTHYROIDISM: ICD-10-CM

## 2022-11-10 DIAGNOSIS — Z12.31 ENCOUNTER FOR SCREENING MAMMOGRAM FOR BREAST CANCER: ICD-10-CM

## 2022-11-10 DIAGNOSIS — G93.5 ARNOLD-CHIARI MALFORMATION, TYPE I: ICD-10-CM

## 2022-11-10 DIAGNOSIS — R90.89 ABNORMAL FINDING ON MRI OF BRAIN: ICD-10-CM

## 2022-11-10 DIAGNOSIS — E78.2 MIXED HYPERLIPIDEMIA: ICD-10-CM

## 2022-11-10 DIAGNOSIS — R26.9 GAIT DISORDER: Primary | ICD-10-CM

## 2022-11-10 DIAGNOSIS — E53.8 VITAMIN B12 DEFICIENCY: ICD-10-CM

## 2022-11-10 DIAGNOSIS — F41.9 ANXIETY: ICD-10-CM

## 2022-11-10 DIAGNOSIS — Z86.69 HISTORY OF CHIARI MALFORMATION: ICD-10-CM

## 2022-11-10 PROCEDURE — 90686 IIV4 VACC NO PRSV 0.5 ML IM: CPT | Performed by: PHYSICIAN ASSISTANT

## 2022-11-10 PROCEDURE — 99214 OFFICE O/P EST MOD 30 MIN: CPT | Performed by: PHYSICIAN ASSISTANT

## 2022-11-10 PROCEDURE — 90471 IMMUNIZATION ADMIN: CPT | Performed by: PHYSICIAN ASSISTANT

## 2022-11-10 RX ORDER — ROSUVASTATIN CALCIUM 10 MG/1
TABLET, COATED ORAL
COMMUNITY
Start: 2022-08-05 | End: 2022-11-20

## 2022-11-10 RX ORDER — TRAMADOL HYDROCHLORIDE 50 MG/1
50 TABLET ORAL EVERY 6 HOURS PRN
Qty: 90 TABLET | Refills: 2 | Status: SHIPPED | OUTPATIENT
Start: 2022-11-10

## 2022-11-10 NOTE — PROGRESS NOTES
"Subjective   Cindy Hawk is a 47 y.o. female.     History of Present Illness    Since the last visit, she has overall felt tired.  She has Hypothyroidism and must update labs to continue treatment, Vitamin D deficiency and will update labs for continued management and Mixed hyperlipidemia is taking generic Crestor we will update labs today.  Also remains on B12 folic acid and will update labs.  She is not taking vitamin D and will update this lab as well.  she has been compliant with current medications have reviewed them.  The patient denies medication side effects.  Will refill medications. /60   Pulse 74   Temp 97.5 °F (36.4 °C)   Resp 16   Ht 170.2 cm (67.01\")   Wt 80.3 kg (177 lb)   SpO2 96%   BMI 27.71 kg/m²   Following up on several things today.  Still concerned that she is having gait disturbance, numb tingling especially upper extremities, vision disturbance not explained by ophthalmology... Referring again to neurology.  Also chronic daily headache  Labs from 4/26/2022 did increase thyroid dose and need follow-up labs today and started her on a statin also need follow-up labs today and will continue to monitor B12 and folic acid due to supplementing  Results for orders placed or performed in visit on 04/26/22   Comprehensive metabolic panel    Specimen: Blood   Result Value Ref Range    Glucose 91 65 - 99 mg/dL    BUN 11 6 - 24 mg/dL    Creatinine 0.89 0.57 - 1.00 mg/dL    EGFR Result 82 >59 mL/min/1.73    BUN/Creatinine Ratio 12 9 - 23    Sodium 139 134 - 144 mmol/L    Potassium 4.4 3.5 - 5.2 mmol/L    Chloride 103 96 - 106 mmol/L    Total CO2 21 20 - 29 mmol/L    Calcium 9.1 8.7 - 10.2 mg/dL    Total Protein 6.6 6.0 - 8.5 g/dL    Albumin 4.2 3.8 - 4.8 g/dL    Globulin 2.4 1.5 - 4.5 g/dL    A/G Ratio 1.8 1.2 - 2.2    Total Bilirubin 0.4 0.0 - 1.2 mg/dL    Alkaline Phosphatase 81 44 - 121 IU/L    AST (SGOT) 13 0 - 40 IU/L    ALT (SGPT) 10 0 - 32 IU/L   Lipid panel    Specimen: Blood "   Result Value Ref Range    Total Cholesterol 230 (H) 100 - 199 mg/dL    Triglycerides 114 0 - 149 mg/dL    HDL Cholesterol 44 >39 mg/dL    VLDL Cholesterol Ron 21 5 - 40 mg/dL    LDL Chol Calc (NIH) 165 (H) 0 - 99 mg/dL   Vitamin B12    Specimen: Blood   Result Value Ref Range    Vitamin B-12 643 232 - 1,245 pg/mL   Folate    Specimen: Blood   Result Value Ref Range    Folate 4.8 >3.0 ng/mL   T3, Free    Specimen: Blood   Result Value Ref Range    T3, Free 2.2 2.0 - 4.4 pg/mL   T4, Free    Specimen: Blood   Result Value Ref Range    Free T4 1.26 0.82 - 1.77 ng/dL   TSH    Specimen: Blood   Result Value Ref Range    TSH 12.900 (H) 0.450 - 4.500 uIU/mL   CBC & Differential    Specimen: Blood   Result Value Ref Range    WBC 9.4 3.4 - 10.8 x10E3/uL    RBC 4.38 3.77 - 5.28 x10E6/uL    Hemoglobin 13.4 11.1 - 15.9 g/dL    Hematocrit 38.9 34.0 - 46.6 %    MCV 89 79 - 97 fL    MCH 30.6 26.6 - 33.0 pg    MCHC 34.4 31.5 - 35.7 g/dL    RDW 12.3 11.7 - 15.4 %    Platelets 314 150 - 450 x10E3/uL    Neutrophil Rel % 64 Not Estab. %    Lymphocyte Rel % 26 Not Estab. %    Monocyte Rel % 6 Not Estab. %    Eosinophil Rel % 3 Not Estab. %    Basophil Rel % 1 Not Estab. %    Neutrophils Absolute 6.1 1.4 - 7.0 x10E3/uL    Lymphocytes Absolute 2.5 0.7 - 3.1 x10E3/uL    Monocytes Absolute 0.5 0.1 - 0.9 x10E3/uL    Eosinophils Absolute 0.2 0.0 - 0.4 x10E3/uL    Basophils Absolute 0.1 0.0 - 0.2 x10E3/uL    Immature Granulocyte Rel % 0 Not Estab. %    Immature Grans Absolute 0.0 0.0 - 0.1 x10E3/uL      Lyly Back PA-C   4/27/2022  7:06 AM EDT       We talked about cholesterol medicine after labs in September.  I prescribed generic Crestor.  Did it give you muscle aches?.  cholesterol is high.  Folic acid is in lower range and want you to start prescription folic acid 1 mg daily and sent Rx.  B12 looks great  Thyroids improved but will will raise dose levothyroxine.  Plan labs in 3 months     Zoloft working well for anxiety we will  continue same dose.  Remains on tramadol for her chronic daily headache and cervical spine pain.     Still has vision disturbance, off balance, N/T in arms and hands.  Can get in legs, tinnitus.  Has been to ophthalmologist --neg.  Concern that MRI 12-20-21 had there is  prominent sulcal enhancement that may be intravascular or leptomeningeal  enhancement or some combination thereof. This may simply be within the  spectrum of normal limits although similar findings can be seen in the  setting of a leptomeningeal inflammatory process. Correlation with  clinical data is suggested.    Had brain MRI and cervical MRI and no MS------she did have ; see neuro  12-10-21   Still has pain in lumbar spine ----does get weakness and pain.  Pain constant; no injury.     Saw DR Espinoza in 2019---noted her surgical hx and reported cerebral tonsils ok.  Not surgical at that time and see neuro  My prior notes:  10-7-21  The pain is described as burning and occurs constantly. The symptoms are progressive. Symptoms are exacerbated by Sitting, definitely standing, walking, laying down and less she is on her side with a pillow between her knees. Factors which relieve the pain include nothing helps. Other associated symptoms include numbness, denies pain radiating down right leg, denies motor loss, denies sensory loss and denies N/T in right leg. Previous history of symptoms: Never like this in the past. Treatment efforts have included heat, ice, movement and Ultram and no help; Zanaflex and no help , without relief.  The following portions of the patient's history were reviewed and updated as appropriate: allergies, current medications, past family history, past medical history, past social history, past surgical history and problem list.    Review of Systems   Constitutional: Positive for diaphoresis and fatigue. Negative for activity change, appetite change and unexpected weight change.   HENT: Positive for tinnitus. Negative for  nosebleeds and trouble swallowing.    Eyes: Positive for visual disturbance. Negative for pain.   Respiratory: Negative for chest tightness, shortness of breath and wheezing.    Cardiovascular: Negative for chest pain and palpitations.   Gastrointestinal: Positive for nausea. Negative for abdominal pain and blood in stool.   Endocrine: Negative.    Genitourinary: Negative for difficulty urinating and hematuria.   Musculoskeletal: Positive for arthralgias, back pain, gait problem, joint swelling, myalgias, neck pain and neck stiffness.   Skin: Negative for color change and rash.   Allergic/Immunologic: Negative.    Neurological: Positive for dizziness, tremors, seizures, speech difficulty, weakness, light-headedness, numbness and headaches. Negative for syncope.   Hematological: Negative for adenopathy.   Psychiatric/Behavioral: Positive for decreased concentration. Negative for agitation and confusion. The patient is nervous/anxious.    All other systems reviewed and are negative.      Objective   Physical Exam  Vitals and nursing note reviewed.   Constitutional:       General: She is not in acute distress.     Appearance: She is well-developed. She is not ill-appearing or toxic-appearing.   HENT:      Head: Normocephalic.      Right Ear: External ear normal.      Left Ear: External ear normal.      Nose: Nose normal.      Mouth/Throat:      Pharynx: Oropharynx is clear.   Eyes:      General: No scleral icterus.     Conjunctiva/sclera: Conjunctivae normal.      Pupils: Pupils are equal, round, and reactive to light.   Neck:      Thyroid: No thyromegaly.   Cardiovascular:      Rate and Rhythm: Normal rate and regular rhythm.      Heart sounds: Normal heart sounds. No murmur heard.  Pulmonary:      Effort: Pulmonary effort is normal. No respiratory distress.      Breath sounds: Normal breath sounds. No rales.   Musculoskeletal:         General: Tenderness present. No deformity. Normal range of motion.      Cervical  back: Normal range of motion and neck supple.      Comments: ROM lumbar pain  Tender in left piriformis muscle   Skin:     General: Skin is warm and dry.      Coloration: Skin is not jaundiced.      Findings: No rash.   Neurological:      General: No focal deficit present.      Mental Status: She is alert and oriented to person, place, and time. Mental status is at baseline.      Sensory: Sensory deficit present.      Coordination: Coordination abnormal.      Gait: Gait abnormal.   Psychiatric:         Mood and Affect: Mood normal.         Behavior: Behavior normal.         Thought Content: Thought content normal.         Judgment: Judgment normal.         Assessment & Plan   Diagnoses and all orders for this visit:    1. Gait disorder (Primary)  -     Cancel: Ambulatory Referral to Neurology  -     Ambulatory Referral to Neurology  -     Comprehensive metabolic panel  -     Lipid panel  -     CBC & Differential  -     Vitamin D,25-Hydroxy  -     Vitamin B12  -     Folate  -     TSH  -     T4, Free  -     T3, Free    2. Abnormal finding on MRI of brain  -     Cancel: Ambulatory Referral to Neurology  -     Ambulatory Referral to Neurology  -     Comprehensive metabolic panel  -     Lipid panel  -     CBC & Differential  -     Vitamin D,25-Hydroxy  -     Vitamin B12  -     Folate  -     TSH  -     T4, Free  -     T3, Free    3. Vision disturbance  -     Cancel: Ambulatory Referral to Neurology  -     Ambulatory Referral to Neurology  -     Comprehensive metabolic panel  -     Lipid panel  -     CBC & Differential  -     Vitamin D,25-Hydroxy  -     Vitamin B12  -     Folate  -     TSH  -     T4, Free  -     T3, Free    4. Acquired hypothyroidism  -     Comprehensive metabolic panel  -     Lipid panel  -     CBC & Differential  -     Vitamin D,25-Hydroxy  -     Vitamin B12  -     Folate  -     TSH  -     T4, Free  -     T3, Free    5. Vitamin B12 deficiency  -     Comprehensive metabolic panel  -     Lipid panel  -      CBC & Differential  -     Vitamin D,25-Hydroxy  -     Vitamin B12  -     Folate  -     TSH  -     T4, Free  -     T3, Free    6. Arnold-Chiari malformation, type I (HCC)  -     Comprehensive metabolic panel  -     Lipid panel  -     CBC & Differential  -     Vitamin D,25-Hydroxy  -     Vitamin B12  -     Folate  -     TSH  -     T4, Free  -     T3, Free    7. Vitamin D deficiency disease  -     Comprehensive metabolic panel  -     Lipid panel  -     CBC & Differential  -     Vitamin D,25-Hydroxy  -     Vitamin B12  -     Folate  -     TSH  -     T4, Free  -     T3, Free    8. Other headache syndrome  Comments:  daily h/a  Orders:  -     Ambulatory Referral to Neurology  -     Comprehensive metabolic panel  -     Lipid panel  -     CBC & Differential  -     Vitamin D,25-Hydroxy  -     Vitamin B12  -     Folate  -     TSH  -     T4, Free  -     T3, Free  -     traMADol (ULTRAM) 50 MG tablet; Take 1 tablet by mouth Every 6 (Six) Hours As Needed for Moderate Pain. PRN pain  Dispense: 90 tablet; Refill: 2    9. Anxiety  -     Comprehensive metabolic panel  -     Lipid panel  -     CBC & Differential  -     Vitamin D,25-Hydroxy  -     Vitamin B12  -     Folate  -     TSH  -     T4, Free  -     T3, Free    10. Mixed hyperlipidemia  -     Comprehensive metabolic panel  -     Lipid panel  -     CBC & Differential  -     Vitamin D,25-Hydroxy  -     Vitamin B12  -     Folate  -     TSH  -     T4, Free  -     T3, Free    11. Encounter for screening mammogram for breast cancer  -     Mammo Screening Digital Tomosynthesis Bilateral With CAD; Future    12. History of Chiari malformation  -     traMADol (ULTRAM) 50 MG tablet; Take 1 tablet by mouth Every 6 (Six) Hours As Needed for Moderate Pain. PRN pain  Dispense: 90 tablet; Refill: 2    13. Neck pain  -     traMADol (ULTRAM) 50 MG tablet; Take 1 tablet by mouth Every 6 (Six) Hours As Needed for Moderate Pain. PRN pain  Dispense: 90 tablet; Refill: 2    14. Chronic tension-type  headache, not intractable  -     traMADol (ULTRAM) 50 MG tablet; Take 1 tablet by mouth Every 6 (Six) Hours As Needed for Moderate Pain. PRN pain  Dispense: 90 tablet; Refill: 2    15. Other headache syndrome  -     Ambulatory Referral to Neurology  -     Comprehensive metabolic panel  -     Lipid panel  -     CBC & Differential  -     Vitamin D,25-Hydroxy  -     Vitamin B12  -     Folate  -     TSH  -     T4, Free  -     T3, Free  -     traMADol (ULTRAM) 50 MG tablet; Take 1 tablet by mouth Every 6 (Six) Hours As Needed for Moderate Pain. PRN pain  Dispense: 90 tablet; Refill: 2    Other orders  -     FluLaval/Fluarix/Fluzone >6 Months        Still concern fo MS--- also concerning was the finding on the MRI of her brain from 12/10/2020 one of the there is  prominent sulcal enhancement that may be intravascular or leptomeningeal  enhancement or some combination thereof. This may simply be within the  spectrum of normal limits although similar findings can be seen in the  setting of a leptomeningeal inflammatory process. Correlation with  clinical data is suggested.----We will place another referral for her to see neurology.  Still has daily h/a  Plan, Cindy Hawk, was seen today.  she was seen for Hyperlipidemia and will continue current medication, Hypothyroidism and will need to update labs for continued treatment and Vitamin D deficiency and will update labs .  Labs from 4/26/2022 did increase thyroid dose and need follow-up labs today and started her on a statin also need follow-up labs today and will continue to monitor B12 and folic acid due to supplementing  We will continue tramadol for treatment of neck pain and chronic daily headache working well  Zoloft working well for anxiety we will continue same dose.  Patient aware that she is due for Pap smear and colon cancer screening  Need mammo

## 2022-11-10 NOTE — PROGRESS NOTES
Immunization  Immunization performed in ld by Kathia Rodriguez MA. Patient tolerated the procedure well without complications.  11/10/22   Kathia Rodriguez MA

## 2022-11-10 NOTE — PATIENT INSTRUCTIONS
Hypothyroidism  Hypothyroidism is when the thyroid gland does not make enough of certain hormones (it is underactive). The thyroid gland is a small gland located in the lower front part of the neck, just in front of the windpipe (trachea). This gland makes hormones that help control how the body uses food for energy (metabolism) as well as how the heart and brain function. These hormones also play a role in keeping your bones strong. When the thyroid is underactive, it produces too little of the hormones thyroxine (T4) and triiodothyronine (T3).  What are the causes?  This condition may be caused by:  Hashimoto's disease. This is a disease in which the body's disease-fighting system (immune system) attacks the thyroid gland. This is the most common cause.  Viral infections.  Pregnancy.  Certain medicines.  Birth defects.  Past radiation treatments to the head or neck for cancer.  Past treatment with radioactive iodine.  Past exposure to radiation in the environment.  Past surgical removal of part or all of the thyroid.  Problems with a gland in the center of the brain (pituitary gland).  Lack of enough iodine in the diet.  What increases the risk?  You are more likely to develop this condition if:  You are female.  You have a family history of thyroid conditions.  You use a medicine called lithium.  You take medicines that affect the immune system (immunosuppressants).  What are the signs or symptoms?  Symptoms of this condition include:  Feeling as though you have no energy (lethargy).  Not being able to tolerate cold.  Weight gain that is not explained by a change in diet or exercise habits.  Lack of appetite.  Dry skin.  Coarse hair.  Menstrual irregularity.  Slowing of thought processes.  Constipation.  Sadness or depression.  How is this diagnosed?  This condition may be diagnosed based on:  Your symptoms, your medical history, and a physical exam.  Blood tests.  You may also have imaging tests, such as an  ultrasound or MRI.  How is this treated?  This condition is treated with medicine that replaces the thyroid hormones that your body does not make. After you begin treatment, it may take several weeks for symptoms to go away.  Follow these instructions at home:  Take over-the-counter and prescription medicines only as told by your health care provider.  If you start taking any new medicines, tell your health care provider.  Keep all follow-up visits as told by your health care provider. This is important.  As your condition improves, your dosage of thyroid hormone medicine may change.  You will need to have blood tests regularly so that your health care provider can monitor your condition.  Contact a health care provider if:  Your symptoms do not get better with treatment.  You are taking thyroid hormone replacement medicine and you:  Sweat a lot.  Have tremors.  Feel anxious.  Lose weight rapidly.  Cannot tolerate heat.  Have emotional swings.  Have diarrhea.  Feel weak.  Get help right away if you have:  Chest pain.  An irregular heartbeat.  A rapid heartbeat.  Difficulty breathing.  Summary  Hypothyroidism is when the thyroid gland does not make enough of certain hormones (it is underactive).  When the thyroid is underactive, it produces too little of the hormones thyroxine (T4) and triiodothyronine (T3).  The most common cause is Hashimoto's disease, a disease in which the body's disease-fighting system (immune system) attacks the thyroid gland. The condition can also be caused by viral infections, medicine, pregnancy, or past radiation treatment to the head or neck.  Symptoms may include weight gain, dry skin, constipation, feeling as though you do not have energy, and not being able to tolerate cold.  This condition is treated with medicine to replace the thyroid hormones that your body does not make.  This information is not intended to replace advice given to you by your health care provider. Make sure you  discuss any questions you have with your health care provider.  Document Revised: 08/25/2022 Document Reviewed: 09/02/2021  [employer] Patient Education © 2022 [employer]

## 2022-11-20 RX ORDER — ROSUVASTATIN CALCIUM 10 MG/1
TABLET, COATED ORAL
Qty: 90 TABLET | Refills: 3 | Status: SHIPPED | OUTPATIENT
Start: 2022-11-20

## 2022-11-21 ENCOUNTER — TELEPHONE (OUTPATIENT)
Dept: NEUROLOGY | Facility: CLINIC | Age: 47
End: 2022-11-21

## 2022-11-22 RX ORDER — OSELTAMIVIR PHOSPHATE 75 MG/1
75 CAPSULE ORAL DAILY
Qty: 10 CAPSULE | Refills: 0 | Status: SHIPPED | OUTPATIENT
Start: 2022-11-22

## 2023-03-29 RX ORDER — SERTRALINE HYDROCHLORIDE 100 MG/1
200 TABLET, FILM COATED ORAL DAILY
Qty: 60 TABLET | Refills: 5 | Status: SHIPPED | OUTPATIENT
Start: 2023-03-29

## 2023-05-01 ENCOUNTER — OFFICE VISIT (OUTPATIENT)
Dept: FAMILY MEDICINE CLINIC | Facility: CLINIC | Age: 48
End: 2023-05-01
Payer: COMMERCIAL

## 2023-05-01 ENCOUNTER — LAB (OUTPATIENT)
Dept: LAB | Facility: HOSPITAL | Age: 48
End: 2023-05-01
Payer: COMMERCIAL

## 2023-05-01 VITALS
DIASTOLIC BLOOD PRESSURE: 72 MMHG | WEIGHT: 182 LBS | OXYGEN SATURATION: 99 % | HEART RATE: 80 BPM | BODY MASS INDEX: 28.56 KG/M2 | HEIGHT: 67 IN | TEMPERATURE: 97.8 F | SYSTOLIC BLOOD PRESSURE: 118 MMHG | RESPIRATION RATE: 16 BRPM

## 2023-05-01 DIAGNOSIS — E03.9 ACQUIRED HYPOTHYROIDISM: ICD-10-CM

## 2023-05-01 DIAGNOSIS — R25.1 TREMOR OF BOTH HANDS: ICD-10-CM

## 2023-05-01 DIAGNOSIS — M54.2 NECK PAIN: ICD-10-CM

## 2023-05-01 DIAGNOSIS — H53.8 BLURRED VISION: ICD-10-CM

## 2023-05-01 DIAGNOSIS — Z86.69 HISTORY OF CHIARI MALFORMATION: ICD-10-CM

## 2023-05-01 DIAGNOSIS — E55.9 VITAMIN D DEFICIENCY DISEASE: ICD-10-CM

## 2023-05-01 DIAGNOSIS — R51.9 CHRONIC DAILY HEADACHE: Primary | ICD-10-CM

## 2023-05-01 DIAGNOSIS — F41.9 ANXIETY: ICD-10-CM

## 2023-05-01 DIAGNOSIS — E78.2 MIXED HYPERLIPIDEMIA: ICD-10-CM

## 2023-05-01 DIAGNOSIS — R73.01 IMPAIRED FASTING GLUCOSE: Primary | ICD-10-CM

## 2023-05-01 DIAGNOSIS — E53.8 LOW FOLIC ACID: ICD-10-CM

## 2023-05-01 DIAGNOSIS — E53.8 LOW SERUM VITAMIN B12: ICD-10-CM

## 2023-05-01 DIAGNOSIS — Z12.11 SCREEN FOR COLON CANCER: ICD-10-CM

## 2023-05-01 DIAGNOSIS — Z12.31 ENCOUNTER FOR SCREENING MAMMOGRAM FOR BREAST CANCER: ICD-10-CM

## 2023-05-01 LAB — 25(OH)D3 SERPL-MCNC: 29.1 NG/ML (ref 30–100)

## 2023-05-01 PROCEDURE — 82306 VITAMIN D 25 HYDROXY: CPT | Performed by: PHYSICIAN ASSISTANT

## 2023-05-01 PROCEDURE — 85025 COMPLETE CBC W/AUTO DIFF WBC: CPT | Performed by: PHYSICIAN ASSISTANT

## 2023-05-01 RX ORDER — HYDROXYZINE PAMOATE 25 MG/1
25 CAPSULE ORAL 3 TIMES DAILY PRN
Qty: 40 CAPSULE | Refills: 2 | Status: SHIPPED | OUTPATIENT
Start: 2023-05-01

## 2023-05-01 RX ORDER — FLUOXETINE HYDROCHLORIDE 20 MG/1
20 CAPSULE ORAL DAILY
Qty: 90 CAPSULE | Refills: 1 | Status: SHIPPED | OUTPATIENT
Start: 2023-05-01

## 2023-05-01 RX ORDER — FLUOXETINE 10 MG/1
CAPSULE ORAL
Qty: 30 CAPSULE | Refills: 0 | Status: SHIPPED | OUTPATIENT
Start: 2023-05-01

## 2023-05-01 RX ORDER — LEVOTHYROXINE SODIUM 175 UG/1
175 TABLET ORAL DAILY
Qty: 90 TABLET | Refills: 3 | Status: SHIPPED | OUTPATIENT
Start: 2023-05-01

## 2023-05-01 RX ORDER — TRAMADOL HYDROCHLORIDE 50 MG/1
50 TABLET ORAL EVERY 6 HOURS PRN
Qty: 90 TABLET | Refills: 2 | Status: SHIPPED | OUTPATIENT
Start: 2023-05-01

## 2023-05-01 RX ORDER — FOLIC ACID 1 MG/1
1 TABLET ORAL DAILY
Qty: 90 TABLET | Refills: 3 | Status: SHIPPED | OUTPATIENT
Start: 2023-05-01

## 2023-05-01 RX ORDER — AMOXICILLIN AND CLAVULANATE POTASSIUM 875; 125 MG/1; MG/1
1 TABLET, FILM COATED ORAL EVERY 12 HOURS SCHEDULED
Qty: 20 TABLET | Refills: 5 | Status: SHIPPED | OUTPATIENT
Start: 2023-05-01

## 2023-05-01 NOTE — PROGRESS NOTES
"Subjective   Cindy Hawk is a 47 y.o. female.     History of Present Illness    Since the last visit, she has overall felt tired.  She has Hyperlipidemia with goals met with current Rx, Hypothyroidism and must update labs to continue treatment and Vitamin D deficiency and will update labs for continued management.  she has been compliant with current medications have reviewed them.---did .recently miss 2 weeks of B12 t she is taking the folic acid and vitamin D he patient denies medication side effects.  Will refill medications. /72   Pulse 80   Temp 97.8 °F (36.6 °C)   Resp 16   Ht 170.2 cm (67.01\")   Wt 82.6 kg (182 lb)   SpO2 99%   BMI 28.50 kg/m²     Results for orders placed or performed in visit on 11/10/22   Vitamin D,25-Hydroxy    Specimen: Blood   Result Value Ref Range    25 Hydroxy, Vitamin D 29.1 (L) 30.0 - 100.0 ng/ml     Patient did follow-up labs this morning noting on last visit 11/10/2022 needed follow-up on her thyroid dose that was increased from last April and 2 reevaluate her status and make sure goals were met on her statin and B12 folic acid and D.  Noted last visit---Still concerned that she is having gait disturbance, numb tingling especially upper extremities, vision disturbance not explained by ophthalmology... Referring again to neurology.  Also chronic daily headache  Tired---some trouble sleeping---still has periods--hot flashes  Cindy Hawk female 47 y.o., /72   Pulse 80   Temp 97.8 °F (36.6 °C)   Resp 16   Ht 170.2 cm (67.01\")   Wt 82.6 kg (182 lb)   SpO2 99%   BMI 28.50 kg/m²   who presents today for new evaluation and treatment of Depression and Anxiety.  She reports depressed mood, fatigue, anxiety, anhedonia, impaired concentration, excessive worry, unable to relax, irritable, sleep disturbance and weight gain. Onset of symptoms was approximately several months ago. She denies current suicidal and homicidal ideation. Risk factors are " family history of anxiety and or depression, lifestyle of multiple roles and chronic pain.  Previous treatment includes only Zoloft. She complains of the following medication side effects:none. The patient declines to go to counseling..  Having anxiety and stressed---depressed  Remains on tramadol for chronic daily headache and cervical spine pain and does help.  The patient has read and signed the [facility] Controlled Substance Contract.  I will continue to see patient for regular follow up appointments.  They are well controlled on their medication.  LISA has been reviewed by me and is updated every 3 months. The patient is aware of the potential for addiction and dependence.  Prior notes:  Still has vision disturbance, off balance, N/T in arms and hands.  Can get in legs, tinnitus. Tremors in arms.  Has been to ophthalmologist --neg.  Concern that MRI 12-20-21 had there is  prominent sulcal enhancement that may be intravascular or leptomeningeal  enhancement or some combination thereof. This may simply be within the  spectrum of normal limits although similar findings can be seen in the  setting of a leptomeningeal inflammatory process. Correlation with  clinical data is suggested.     Had brain MRI and cervical MRI and no MS------she did have ; see neuro  12-10-21   Still has pain in lumbar spine ----does get weakness and pain.  Pain constant; no injury.     Saw DR Espinoza in 2019---noted her surgical hx and reported cerebral tonsils ok.  Not surgical at that time and see neuro  My prior notes:  10-7-21  The pain is described as burning and occurs constantly. The symptoms are progressive. Symptoms are exacerbated by Sitting, definitely standing, walking, laying down and less she is on her side with a pillow between her knees. Factors which relieve the pain include nothing helps. Other associated symptoms include numbness, denies pain radiating down right leg, denies motor loss, denies sensory loss and  denies N/T in right leg. Previous history of symptoms: Never like this in the past. Treatment efforts have included heat, ice, movement and Ultram and no help; Zanaflex and no help , without relief  No show 11-21-22 neuro appt---still needs to see neuro  The following portions of the patient's history were reviewed and updated as appropriate: allergies, current medications, past family history, past medical history, past social history, past surgical history and problem list.    Review of Systems   Constitutional: Positive for activity change, diaphoresis, fatigue and unexpected weight change.   HENT: Positive for tinnitus.    Gastrointestinal: Positive for nausea.   Musculoskeletal: Positive for arthralgias, back pain, gait problem, joint swelling, myalgias, neck pain and neck stiffness.   Neurological: Positive for dizziness, tremors, weakness, light-headedness, numbness and headaches.   Psychiatric/Behavioral: Positive for agitation, decreased concentration, dysphoric mood and sleep disturbance. The patient is nervous/anxious.        Objective   Physical Exam  Vitals and nursing note reviewed.   Constitutional:       General: She is not in acute distress.     Appearance: She is well-developed. She is not ill-appearing or toxic-appearing.   HENT:      Head: Normocephalic.      Right Ear: External ear normal.      Left Ear: External ear normal.      Nose: Nose normal.      Mouth/Throat:      Pharynx: Oropharynx is clear.   Eyes:      General: No scleral icterus.     Conjunctiva/sclera: Conjunctivae normal.      Pupils: Pupils are equal, round, and reactive to light.   Neck:      Thyroid: No thyromegaly.   Cardiovascular:      Rate and Rhythm: Normal rate and regular rhythm.      Pulses: Normal pulses.      Heart sounds: Normal heart sounds. No murmur heard.  Pulmonary:      Effort: Pulmonary effort is normal. No respiratory distress.      Breath sounds: Normal breath sounds.   Musculoskeletal:         General: No  deformity. Normal range of motion.      Cervical back: Normal range of motion and neck supple.   Skin:     General: Skin is warm and dry.      Findings: No rash.   Neurological:      General: No focal deficit present.      Mental Status: She is alert and oriented to person, place, and time. Mental status is at baseline.      Coordination: Coordination abnormal.   Psychiatric:         Mood and Affect: Mood normal.         Behavior: Behavior normal.         Thought Content: Thought content normal.         Judgment: Judgment normal.         Assessment & Plan   Diagnoses and all orders for this visit:    1. Chronic daily headache (Primary)  -     Ambulatory Referral to Neurology    2. Tremor of both hands  -     Ambulatory Referral to Neurology    3. Blurred vision  -     Ambulatory Referral to Neurology    4. Low folic acid    5. Acquired hypothyroidism    6. Mixed hyperlipidemia    7. Vitamin D deficiency disease    8. Low serum vitamin B12    9. History of Chiari malformation    10. Anxiety    11. Screen for colon cancer  -     Cologuard - Stool, Per Rectum; Future    12. Encounter for screening mammogram for breast cancer  -     Mammo Screening Digital Tomosynthesis Bilateral With CAD    Other orders  -     FLUoxetine (PROzac) 10 MG capsule; 1 p.o. daily x7 days then 2 p.o. daily..... After finished this fill the 20 mg Prozac Rx  Dispense: 30 capsule; Refill: 0  -     FLUoxetine (PROzac) 20 MG capsule; Take 1 capsule by mouth Daily. For anxiety and depression  Dispense: 90 capsule; Refill: 1  -     hydrOXYzine pamoate (Vistaril) 25 MG capsule; Take 1 capsule by mouth 3 (Three) Times a Day As Needed for Anxiety (And trouble sleeping).  Dispense: 40 capsule; Refill: 2      Due for Pap smear and colon cancer screening  Discussed referral to neurologist again today  Taper off Zoloft while starting Prozac 10mg X 7 days then 20mg daily  Plan to continue vitamin D, supplementation of B12 and folic acid with labs  pending  Continue tramadol for chronic daily headache and neck pain does help  Plan, Cindy Hawk, was seen today.  she was seen for Hyperlipidemia and will continue current medication, Hypothyroidism and will need to update labs for continued treatment and Vitamin D deficiency and supplemented.  Ruth walters  Will taper Zoloft down to 150 mg for 1 week and then 100 mg for 1 week then 50 mg for 1 week while at the same time starting Prozac at 10 mg daily for 1 week and then increase to 20 mg daily after that and sent hydroxyzine as needed trouble sleeping and acute anxiety  Stop smoking

## 2023-05-01 NOTE — PATIENT INSTRUCTIONS
Generalized Anxiety Disorder, Adult  Generalized anxiety disorder (PA) is a mental health condition. Unlike normal worries, anxiety related to PA is not triggered by a specific event. These worries do not fade or get better with time. PA interferes with relationships, work, and school.  PA symptoms can vary from mild to severe. People with severe PA can have intense waves of anxiety with physical symptoms that are similar to panic attacks.  What are the causes?  The exact cause of PA is not known, but the following are believed to have an impact:  Differences in natural brain chemicals.  Genes passed down from parents to children.  Differences in the way threats are perceived.  Development and stress during childhood.  Personality.  What increases the risk?  The following factors may make you more likely to develop this condition:  Being female.  Having a family history of anxiety disorders.  Being very shy.  Experiencing very stressful life events, such as the death of a loved one.  Having a very stressful family environment.  What are the signs or symptoms?  People with PA often worry excessively about many things in their lives, such as their health and family. Symptoms may also include:  Mental and emotional symptoms:  Worrying excessively about natural disasters.  Fear of being late.  Difficulty concentrating.  Fears that others are judging your performance.  Physical symptoms:  Fatigue.  Headaches, muscle tension, muscle twitches, trembling, or feeling shaky.  Feeling like your heart is pounding or beating very fast.  Feeling out of breath or like you cannot take a deep breath.  Having trouble falling asleep or staying asleep, or experiencing restlessness.  Sweating.  Nausea, diarrhea, or irritable bowel syndrome (IBS).  Behavioral symptoms:  Experiencing erratic moods or irritability.  Avoidance of new situations.  Avoidance of people.  Extreme difficulty making decisions.  How is this diagnosed?  This  condition is diagnosed based on your symptoms and medical history. You will also have a physical exam. Your health care provider may perform tests to rule out other possible causes of your symptoms.  To be diagnosed with PA, a person must have anxiety that:  Is out of his or her control.  Affects several different aspects of his or her life, such as work and relationships.  Causes distress that makes him or her unable to take part in normal activities.  Includes at least three symptoms of PA, such as restlessness, fatigue, trouble concentrating, irritability, muscle tension, or sleep problems.  Before your health care provider can confirm a diagnosis of PA, these symptoms must be present more days than they are not, and they must last for 6 months or longer.  How is this treated?  This condition may be treated with:  Medicine. Antidepressant medicine is usually prescribed for long-term daily control. Anti-anxiety medicines may be added in severe cases, especially when panic attacks occur.  Talk therapy (psychotherapy). Certain types of talk therapy can be helpful in treating PA by providing support, education, and guidance. Options include:  Cognitive behavioral therapy (CBT). People learn coping skills and self-calming techniques to ease their physical symptoms. They learn to identify unrealistic thoughts and behaviors and to replace them with more appropriate thoughts and behaviors.  Acceptance and commitment therapy (ACT). This treatment teaches people how to be mindful as a way to cope with unwanted thoughts and feelings.  Biofeedback. This process trains you to manage your body's response (physiological response) through breathing techniques and relaxation methods. You will work with a therapist while machines are used to monitor your physical symptoms.  Stress management techniques. These include yoga, meditation, and exercise.  A mental health specialist can help determine which treatment is best for you.  Some people see improvement with one type of therapy. However, other people require a combination of therapies.  Follow these instructions at home:  Lifestyle  Maintain a consistent routine and schedule.  Anticipate stressful situations. Create a plan and allow extra time to work with your plan.  Practice stress management or self-calming techniques that you have learned from your therapist or your health care provider.  Exercise regularly and spend time outdoors.  Eat a healthy diet that includes plenty of vegetables, fruits, whole grains, low-fat dairy products, and lean protein.  Do not eat a lot of foods that are high in fat, added sugar, or salt (sodium).  Drink plenty of water.  Avoid alcohol. Alcohol can increase anxiety.  Avoid caffeine and certain over-the-counter cold medicines. These may make you feel worse. Ask your pharmacist which medicines to avoid.  General instructions  Take over-the-counter and prescription medicines only as told by your health care provider.  Understand that you are likely to have setbacks. Accept this and be kind to yourself as you persist to take better care of yourself.  Anticipate stressful situations. Create a plan and allow extra time to work with your plan.  Recognize and accept your accomplishments, even if you  them as small.  Spend time with people who care about you.  Keep all follow-up visits. This is important.  Where to find more information  National [place] of Mental Health: www.nimh.nih.gov  Substance Abuse and Mental Health Services: www.samhsa.gov  Contact a health care provider if:  Your symptoms do not get better.  Your symptoms get worse.  You have signs of depression, such as:  A persistently sad or irritable mood.  Loss of enjoyment in activities that used to bring you nathanael.  Change in weight or eating.  Changes in sleeping habits.  Get help right away if:  You have thoughts about hurting yourself or others.  If you ever feel like you may hurt  yourself or others, or have thoughts about taking your own life, get help right away. Go to your nearest emergency department or:  Call your local emergency services (421 in the [place]  Call a suicide crisis helpline, such as the National Suicide Prevention Lifeline at 1-662.691.7569 or 137 in the [place] This is open 24 hours a day in the [place]  Text the Crisis Text Line at 912579 (in the [place]  Summary  Generalized anxiety disorder (PA) is a mental health condition that involves worry that is not triggered by a specific event.  People with PA often worry excessively about many things in their lives, such as their health and family.  PA may cause symptoms such as restlessness, trouble concentrating, sleep problems, frequent sweating, nausea, diarrhea, headaches, and trembling or muscle twitching.  A mental health specialist can help determine which treatment is best for you. Some people see improvement with one type of therapy. However, other people require a combination of therapies.  This information is not intended to replace advice given to you by your health care provider. Make sure you discuss any questions you have with your health care provider.  Document Revised: 07/13/2022 Document Reviewed: 04/10/2022  [employer] Patient Education © 2022 [employer]

## 2023-05-02 DIAGNOSIS — R73.01 IMPAIRED FASTING GLUCOSE: Primary | ICD-10-CM

## 2023-05-02 DIAGNOSIS — E55.9 VITAMIN D DEFICIENCY DISEASE: ICD-10-CM

## 2023-05-02 DIAGNOSIS — E53.8 LOW SERUM VITAMIN B12: ICD-10-CM

## 2023-05-02 DIAGNOSIS — E03.9 ACQUIRED HYPOTHYROIDISM: ICD-10-CM

## 2023-05-02 DIAGNOSIS — R51.9 CHRONIC DAILY HEADACHE: ICD-10-CM

## 2023-05-02 DIAGNOSIS — E78.2 MIXED HYPERLIPIDEMIA: ICD-10-CM

## 2023-05-02 RX ORDER — ROSUVASTATIN CALCIUM 20 MG/1
20 TABLET, COATED ORAL DAILY
Qty: 90 TABLET | Refills: 3 | Status: SHIPPED | OUTPATIENT
Start: 2023-05-02

## 2023-05-16 ENCOUNTER — HOSPITAL ENCOUNTER (OUTPATIENT)
Dept: MAMMOGRAPHY | Facility: HOSPITAL | Age: 48
Discharge: HOME | End: 2023-05-16
Admitting: PHYSICIAN ASSISTANT
Payer: COMMERCIAL

## 2023-05-16 PROCEDURE — 77063 BREAST TOMOSYNTHESIS BI: CPT

## 2023-05-16 PROCEDURE — 77067 SCR MAMMO BI INCL CAD: CPT

## 2023-10-05 ENCOUNTER — TELEPHONE (OUTPATIENT)
Dept: NEUROLOGY | Facility: CLINIC | Age: 48
End: 2023-10-05
Payer: COMMERCIAL

## 2023-12-04 ENCOUNTER — OFFICE VISIT (OUTPATIENT)
Dept: FAMILY MEDICINE CLINIC | Facility: CLINIC | Age: 48
End: 2023-12-04
Payer: COMMERCIAL

## 2023-12-04 VITALS
SYSTOLIC BLOOD PRESSURE: 108 MMHG | TEMPERATURE: 97.6 F | HEIGHT: 67 IN | HEART RATE: 68 BPM | DIASTOLIC BLOOD PRESSURE: 76 MMHG | WEIGHT: 185 LBS | OXYGEN SATURATION: 98 % | BODY MASS INDEX: 29.03 KG/M2 | RESPIRATION RATE: 16 BRPM

## 2023-12-04 DIAGNOSIS — R26.9 GAIT DISORDER: ICD-10-CM

## 2023-12-04 DIAGNOSIS — M54.2 NECK PAIN: ICD-10-CM

## 2023-12-04 DIAGNOSIS — F41.9 ANXIETY: ICD-10-CM

## 2023-12-04 DIAGNOSIS — Z86.69 HISTORY OF CHIARI MALFORMATION: ICD-10-CM

## 2023-12-04 DIAGNOSIS — R11.0 NAUSEA: ICD-10-CM

## 2023-12-04 DIAGNOSIS — E53.8 LOW FOLIC ACID: ICD-10-CM

## 2023-12-04 DIAGNOSIS — E78.2 MIXED HYPERLIPIDEMIA: Primary | ICD-10-CM

## 2023-12-04 DIAGNOSIS — E53.8 VITAMIN B12 DEFICIENCY: ICD-10-CM

## 2023-12-04 DIAGNOSIS — G44.229 CHRONIC TENSION-TYPE HEADACHE, NOT INTRACTABLE: ICD-10-CM

## 2023-12-04 DIAGNOSIS — E03.9 ACQUIRED HYPOTHYROIDISM: ICD-10-CM

## 2023-12-04 DIAGNOSIS — E55.9 VITAMIN D DEFICIENCY DISEASE: ICD-10-CM

## 2023-12-04 RX ORDER — ONDANSETRON 4 MG/1
4 TABLET, FILM COATED ORAL EVERY 8 HOURS PRN
Qty: 45 TABLET | Refills: 11 | Status: SHIPPED | OUTPATIENT
Start: 2023-12-04

## 2023-12-04 RX ORDER — FLUOXETINE HYDROCHLORIDE 20 MG/1
20 CAPSULE ORAL DAILY
Qty: 90 CAPSULE | Refills: 3 | Status: SHIPPED | OUTPATIENT
Start: 2023-12-04

## 2023-12-04 RX ORDER — TRAMADOL HYDROCHLORIDE 50 MG/1
50 TABLET ORAL EVERY 6 HOURS PRN
Qty: 90 TABLET | Refills: 2 | Status: SHIPPED | OUTPATIENT
Start: 2023-12-04

## 2023-12-04 NOTE — PROGRESS NOTES
"Subjective   Cindy Hawk is a 48 y.o. female.     Hyperlipidemia  Exacerbating diseases include hypothyroidism. Associated symptoms include myalgias.   Hypothyroidism  Associated symptoms include arthralgias, fatigue, joint swelling, myalgias, nausea, neck pain, numbness and weakness. Pertinent negatives include no diaphoresis.   Anxiety  Symptoms include decreased concentration, dizziness and nausea. Patient reports no suicidal ideas.       Neck Pain   Associated symptoms include numbness, photophobia and weakness. Pertinent negatives include no trouble swallowing.       Since the last visit, she has overall felt tired.  She has Hypothyroidism and must update labs to continue treatment, Vitamin D deficiency and labs are at goal >30 ng/mL, and mixed hyperlipidemia need to update labs to check on new dose of Crestor from 5/1/2023 labs .  she has been compliant with current medications have reviewed them.  The patient denies medication side effects.  Will refill medications. /86   Pulse 68   Temp 97.6 °F (36.4 °C)   Resp 16   Ht 170.2 cm (67.01\")   Wt 83.9 kg (185 lb)   SpO2 98%   BMI 28.97 kg/m²     Results for orders placed or performed in visit on 11/10/22   Vitamin D,25-Hydroxy    Specimen: Blood   Result Value Ref Range    25 Hydroxy, Vitamin D 29.1 (L) 30.0 - 100.0 ng/ml     Noted last visit---Still concerned that she is having gait disturbance, numb tingling especially upper extremities, vision disturbance not explained by ophthalmology... Referring again to neurology.  Also chronic daily headache   Remains on tramadol for chronic daily headache and cervical spine pain and does help.  The patient has read and signed the [facility] Controlled Substance Contract.  I will continue to see patient for regular follow up appointments.  They are well controlled on their medication.  LISA has been reviewed by me and is updated every 3 months. The patient is aware of the potential for addiction " "and dependence.  Prior notes:  Still has vision disturbance, off balance, N/T in arms and hands.  Can get in legs, tinnitus. Tremors in arms.  Has been to ophthalmologist --neg.  Concern that MRI 12-20-21 had there is  prominent sulcal enhancement that may be intravascular or leptomeningeal  enhancement or some combination thereof. This may simply be within the  spectrum of normal limits although similar findings can be seen in the  setting of a leptomeningeal inflammatory process. Correlation with  clinical data is suggested.     Had brain MRI and cervical MRI and no MS------she did have ; see neuro  12-10-21   Still has pain in lumbar spine ----does get weakness and pain.  Pain constant; no injury.     Saw DR Espinoza in 2019---noted her surgical hx and reported cerebral tonsils ok.  Not surgical at that time and see neuro  My prior notes:  10-7-21  The pain is described as burning and occurs constantly. The symptoms are progressive. Symptoms are exacerbated by Sitting, definitely standing, walking, laying down and less she is on her side with a pillow between her knees. Factors which relieve the pain include nothing helps. Other associated symptoms include numbness, denies pain radiating down right leg, denies motor loss, denies sensory loss and denies N/T in right leg. Previous history of symptoms: Never like this in the past. Treatment efforts have included heat, ice, movement and Ultram and no help; Zanaflex and no help , without relief  No show 11-21-22 neuro appt---still needs to see neuro  Cindy BURGER Carine female 48 y.o., /86   Pulse 68   Temp 97.6 °F (36.4 °C)   Resp 16   Ht 170.2 cm (67.01\")   Wt 83.9 kg (185 lb)   SpO2 98%   BMI 28.97 kg/m²   who presents today for follow up of Depression and Anxiety.  She reports last visit on 5/1/2023 she was having breakthrough anxiety and depression and we tapered the Zoloft and changed to Prozac and started out at 10 mg daily and then she message " me and we went up to 20 mg daily and this is working well for anxiety and depression at this dose. Onset of symptoms was approximately several years ago. She denies current suicidal and homicidal ideation. Risk factors are family history of anxiety and or depression and lifestyle of multiple roles.  Chronic pain. Previous treatment includes  Zoloft and now on Prozac . She complains of the following medication side effects:none. The patient declines to go to counseling..  Continue Prozac working well for anxiety and depression and has as needed hydroxyzine as needed for anxiety and trouble sleeping works well  The following portions of the patient's history were reviewed and updated as appropriate: allergies, current medications, past family history, past medical history, past social history, past surgical history, and problem list.    Review of Systems   Constitutional:  Positive for activity change and fatigue. Negative for diaphoresis.   HENT:  Positive for tinnitus. Negative for nosebleeds and trouble swallowing.    Eyes:  Positive for photophobia and visual disturbance. Negative for blurred vision.   Respiratory:  Negative for choking.    Gastrointestinal:  Positive for nausea. Negative for blood in stool.   Musculoskeletal:  Positive for arthralgias, back pain, gait problem, joint swelling, myalgias and neck pain.   Allergic/Immunologic: Negative for immunocompromised state.   Neurological:  Positive for dizziness, weakness, light-headedness, numbness and headache. Negative for facial asymmetry and speech difficulty.   Psychiatric/Behavioral:  Positive for decreased concentration. Negative for self-injury and suicidal ideas.        Objective   Physical Exam  Vitals and nursing note reviewed.   Constitutional:       General: She is not in acute distress.     Appearance: She is well-developed. She is not ill-appearing or toxic-appearing.   HENT:      Head: Normocephalic.      Right Ear: External ear normal.       Left Ear: External ear normal.      Nose: Nose normal.      Mouth/Throat:      Pharynx: Oropharynx is clear.   Eyes:      General: No scleral icterus.     Conjunctiva/sclera: Conjunctivae normal.      Pupils: Pupils are equal, round, and reactive to light.   Neck:      Thyroid: No thyromegaly.   Cardiovascular:      Rate and Rhythm: Normal rate and regular rhythm.      Heart sounds: Normal heart sounds. No murmur heard.  Pulmonary:      Effort: Pulmonary effort is normal. No respiratory distress.      Breath sounds: Normal breath sounds.   Musculoskeletal:         General: No deformity. Normal range of motion.      Cervical back: Normal range of motion and neck supple.   Skin:     General: Skin is warm and dry.      Findings: No rash.   Neurological:      General: No focal deficit present.      Mental Status: She is alert and oriented to person, place, and time. Mental status is at baseline.   Psychiatric:         Mood and Affect: Mood normal.         Behavior: Behavior normal.         Thought Content: Thought content normal.         Judgment: Judgment normal.           Assessment & Plan   Diagnoses and all orders for this visit:    1. Mixed hyperlipidemia (Primary)    2. Acquired hypothyroidism    3. Anxiety    4. Chronic tension-type headache, not intractable    5. History of Chiari malformation    6. Vitamin D deficiency disease    7. Vitamin B12 deficiency    8. Low folic acid    9. Gait disorder    10. Nausea    Other orders  -     Fluzone (or Fluarix & Flulaval for VFC) >6mos  -     Pneumococcal Conjugate Vaccine 20-Valent All  -     ondansetron (Zofran) 4 MG tablet; Take 1 tablet by mouth Every 8 (Eight) Hours As Needed for Nausea or Vomiting.  Dispense: 45 tablet; Refill: 11      Need follow-up labs noting 5/1/2023 labs increased her Crestor dose, following borderline thyroid results from 5/1/2023 and added B12 over-the-counter--last labs  Plan, Cindy Hawk, was seen today.  she was seen for  Hyperlipidemia and will continue current medication, Hypothyroidism and will need to update labs for continued treatment, and Vitamin D deficiency and supplemented.  Stop smoking  Pap due  Do advise completing Cologuard for colon cancer screening  Still needs to see neurology concerned that she still having chronic daily headache, balance issues, numb tingling, tremors, vision disturbance  Plan to continue vitamin D, supplementation of B12 and folic acid with labs pending  Continue tramadol for chronic daily headache and neck pain does help----history of Chiari malformation repair  Plan, Cindy Hawk, was seen today.  she was seen for Hyperlipidemia and will continue current medication, Hypothyroidism and will need to update labs for continued treatment and Vitamin D deficiency and supplemented.  Continue Prozac working well for anxiety and depression and has as needed hydroxyzine as needed for anxiety and trouble sleeping works well  Continue Zofran as needed nausea works well

## 2023-12-04 NOTE — PROGRESS NOTES
Immunization  Immunization performed in left deltoid by Salena Nixon. Patient tolerated the procedure well without complications.  12/04/23   Salena Nixon

## 2023-12-04 NOTE — PROGRESS NOTES
Immunization  Immunization performed in right deltoid by Salena Nixon. Patient tolerated the procedure well without complications.  12/04/23   Salena Nixon

## 2023-12-14 ENCOUNTER — PATIENT MESSAGE (OUTPATIENT)
Dept: FAMILY MEDICINE CLINIC | Facility: CLINIC | Age: 48
End: 2023-12-14
Payer: COMMERCIAL

## 2023-12-15 RX ORDER — FLUOXETINE HYDROCHLORIDE 40 MG/1
40 CAPSULE ORAL DAILY
Qty: 90 CAPSULE | Refills: 1 | Status: SHIPPED | OUTPATIENT
Start: 2023-12-15

## 2023-12-15 NOTE — TELEPHONE ENCOUNTER
From: Cindy Hawk  To: Lyly Back  Sent: 12/14/2023 8:07 PM EST  Subject: Prozac    Can I raise my Prozac? The last few day I feel like it’s not helping as much. I’m at 20 right now.

## 2024-06-02 RX ORDER — FLUOXETINE HYDROCHLORIDE 40 MG/1
CAPSULE ORAL
Qty: 90 CAPSULE | Refills: 0 | Status: SHIPPED | OUTPATIENT
Start: 2024-06-02

## 2024-06-13 DIAGNOSIS — M54.2 NECK PAIN: ICD-10-CM

## 2024-06-13 DIAGNOSIS — Z86.69 HISTORY OF CHIARI MALFORMATION: ICD-10-CM

## 2024-06-13 RX ORDER — TRAMADOL HYDROCHLORIDE 50 MG/1
50 TABLET ORAL EVERY 6 HOURS PRN
Qty: 90 TABLET | Refills: 0 | Status: SHIPPED | OUTPATIENT
Start: 2024-06-13

## 2024-07-29 ENCOUNTER — TELEMEDICINE (OUTPATIENT)
Dept: FAMILY MEDICINE CLINIC | Facility: TELEHEALTH | Age: 49
End: 2024-07-29
Payer: COMMERCIAL

## 2024-07-29 DIAGNOSIS — H57.89 EYE IRRITATION: Primary | ICD-10-CM

## 2024-07-29 PROCEDURE — 99213 OFFICE O/P EST LOW 20 MIN: CPT | Performed by: NURSE PRACTITIONER

## 2024-07-29 RX ORDER — AZELASTINE HYDROCHLORIDE 0.5 MG/ML
1 SOLUTION/ DROPS OPHTHALMIC 2 TIMES DAILY
Qty: 6 ML | Refills: 0 | Status: SHIPPED | OUTPATIENT
Start: 2024-07-29

## 2024-07-29 NOTE — PROGRESS NOTES
"CHIEF COMPLAINT  Chief Complaint   Patient presents with    Eye Problem         HPI  Cindy Hawk is a 48 y.o. female  presents with complaint of eye problem. She states she has some pain when she palpates the left corner of her eye with some itching. She has a \"bag\" beneath her eye. She has had this for 2 days.   No redness or discharge. No visual disturbance. No systemic cold or allergy symptoms. No fever.   She does not feel like there is anything in her eye. No irritation to the whites of her eyes. No tearing.     Review of Systems   Constitutional:  Negative for chills, diaphoresis, fatigue and fever.   HENT:  Negative for congestion, postnasal drip, rhinorrhea, sneezing and sore throat.    Eyes:  Positive for pain and itching. Negative for photophobia, discharge, redness and visual disturbance.   Respiratory:  Negative for cough.        Past Medical History:   Diagnosis Date    Anxiety     Arnold-Chiari malformation, type I     Arthritis     Chiari I malformation     Disease of thyroid gland     Headache     Hyperlipidemia     Insomnia     Neck pain     Peripheral neuropathy     Vitamin B12 deficiency     Vitamin D deficiency disease        Family History   Problem Relation Age of Onset    COPD Mother     Emphysema Mother     Pulmonary embolism Father     Alcohol abuse Other     Diabetes Other     Cancer Other     Kidney disease Other        Social History     Socioeconomic History    Marital status:     Number of children: 3    Years of education: HIGHSCHOOL   Tobacco Use    Smoking status: Every Day     Current packs/day: 0.00     Types: Cigarettes     Last attempt to quit: 2016     Years since quittin.5     Passive exposure: Never    Smokeless tobacco: Never   Vaping Use    Vaping status: Never Used   Substance and Sexual Activity    Alcohol use: No    Drug use: No    Sexual activity: Defer         There were no vitals taken for this visit.    PHYSICAL EXAM  Physical Exam "   Constitutional: She is oriented to person, place, and time. She appears well-developed and well-nourished. She does not have a sickly appearance. She does not appear ill. No distress.   HENT:   Head: Normocephalic and atraumatic.   Eyes: Conjunctivae and EOM are normal. Right eye exhibits no discharge, no exudate, no hordeolum and no edema. No foreign body present in the right eye. Left eye exhibits edema (tenderness when she presses left outer corner of eye. Swelling of lower lide is mild.). Left eye exhibits no discharge, no exudate and no hordeolum. No foreign body present in the left eye. Eyelid: no right ptosis or left ptosis. No scleral icterus.       Neck: Neck normal appearance.  Pulmonary/Chest: Effort normal.  No respiratory distress.  Neurological: She is alert and oriented to person, place, and time.   Skin: Skin is dry.   Psychiatric: She has a normal mood and affect.         Diagnoses and all orders for this visit:    1. Eye irritation (Primary)    Other orders  -     azelastine (OPTIVAR) 0.05 % ophthalmic solution; Administer 1 drop into the left eye 2 (Two) Times a Day.  Dispense: 6 mL; Refill: 0    Use cool compress to decrease swelling.     The use of a video visit has been reviewed with the patient and verbal informed consent has been obtained. Myself and Cindy Hawk participated in this visit. The patient is located in [address] [place] [address]. I am located in [place], [place]. [employer] were utilized.       Note Disclaimer: At [facility], we believe that sharing information builds trust and better   relationships. You are receiving this note because you recently visited [facility]. It is possible you   will see health information before a provider has talked with you about it. This kind of information can   be easy to misunderstand. To help you fully understand what it means for your health, we urge you to   discuss this note with your provider.    Sangita  Marisa Birmingham, MIKE  07/29/2024  13:55 EDT

## 2024-07-29 NOTE — PATIENT INSTRUCTIONS
Cool compresses to left eye to decrease swelling.   Do not rub eye  If symptoms do not improve or worsen, follow up at urgent care        Allergic Conjunctivitis, Adult  Allergic conjunctivitis is inflammation of the conjunctiva. The conjunctiva is the thin, clear membrane that covers the white part of the eye and the inner surface of the eyelid. Allergies can affect this layer of the eye. In this condition:  The blood vessels in the conjunctiva swell and become irritated.  The eyes become red or pink and feel itchy.  There is often a watery discharge from the eyes.  Allergic conjunctivitis is not contagious. This means it cannot be spread from person to person. The condition can develop at any age and may be outgrown.  What are the causes?  This condition is caused by allergens. These are things that can cause an allergic reaction in some people. Common allergens include:  Outdoor allergens, such as:  Pollen, including pollen from grass and weeds.  Mold spores.  Car fumes.  Pollution.  Indoor allergens, such as:  Dust.  Smoke.  Mold spores.  Proteins in a pet's urine, saliva, or dander.  Protein buildup on contact lenses.  What increases the risk?  You may be more likely to develop this condition if you have a family history of these things:  Allergies.  Conditions caused by being exposed to allergens, such as:  Allergic rhinitis. This is an allergic reaction that affects the nose.  Bronchial asthma. This condition affects the large airways in the lungs and makes breathing difficult.  Atopic dermatitis (eczema). This is inflammation of the skin that is long-term (chronic).  What are the signs or symptoms?  Symptoms of this condition include eyes that are itchy, red, watery, or puffy. Your eyes may also:  Sting or burn.  Have clear fluid draining from them.  Have thick mucus discharge and pain (vernal conjunctivitis). This happens in severe cases.  How is this diagnosed?  This condition may be diagnosed based  on:  Your medical history.  A physical exam, including an eye exam.  Tests of the fluid draining from your eyes to rule out other causes.  Other tests to confirm the diagnosis, including:  Testing for allergies. The skin may be pricked with a tiny needle. The pricked area is then exposed to small amounts of allergens.  Testing for other eye conditions. Tests may include:  Blood tests.  Tissue scrapings from your eyelid. The tissue is then checked under a microscope.  How is this treated?  Treatment for this condition may include:  Using cold, wet cloths (cold compresses) to soothe itching and swelling.  Washing your face and hair. Also, washing your clothes often to remove allergens.  Using eye drops. These may be prescription or over-the-counter. You may need to try different types to see which one works best for you. Examples include:  Eye drops that wash allergens out of the eyes (preservative-free artificial tears).  Eye drops that block the allergic reaction (antihistamine).  Eye drops that reduce swelling and irritation (anti-inflammatory).  Steroid eye drops, which may be given if other treatments have not worked.  Oral antihistamine medicines. These are medicines taken by mouth to lessen your allergic reaction. You may need these if eye drops do not help or are difficult to use.  An air purifier at home and work.  Wraparound sunglasses. This may help to decrease the amount of allergens reaching the eye.  Not wearing contact lenses until symptoms improve, if the condition was caused by contact lenses. Change to daily wear disposable contact lenses, if possible.  Follow these instructions at home:  Eye care  Apply a clean, cold compress to your eyes for 10-20 minutes, 3-4 times a day.  Do not touch or rub your eyes.  Do not wear contact lenses until the inflammation is gone. Wear glasses instead.  Do not wear eye makeup until the inflammation is gone.  General instructions  Avoid known allergens whenever  possible.  Take or apply over-the-counter and prescription medicines only as told by your health care provider. These include any eye drops.  Drink enough fluid to keep your urine pale yellow.  Keep all follow-up visits.  Contact a health care provider if:  Your symptoms get worse or do not get better with treatment.  You have mild eye pain.  You become sensitive to light.  You have spots or blisters on your eyes.  You have a fever.  Get help right away if:  You have redness, swelling, or other symptoms in only one eye.  Your vision is blurred or you have other vision changes.  You have pus draining from your eyes.  You have severe eye pain.  Summary  Allergic conjunctivitis is inflammation of the eye that is caused by allergens. It affects the clear membrane that covers the white part of the eye and the inner surface of the eyelid.  It often causes eye itching, redness, and a watery discharge.  Take or apply over-the-counter and prescription medicines only as told by your health care provider. These include eye drops.  Do not touch or rub your eyes.  Contact a health care provider if your symptoms get worse or do not get better with treatment.  This information is not intended to replace advice given to you by your health care provider. Make sure you discuss any questions you have with your health care provider.  Document Revised: 02/27/2023 Document Reviewed: 02/27/2023  Elsevier Patient Education © 2024 [employer]

## 2024-07-30 RX ORDER — BACITRACIN 500 [USP'U]/G
OINTMENT OPHTHALMIC 3 TIMES DAILY
Qty: 3.5 G | Refills: 0 | Status: SHIPPED | OUTPATIENT
Start: 2024-07-30 | End: 2024-08-06

## 2024-07-31 DIAGNOSIS — H00.025 HORDEOLUM INTERNUM OF LEFT LOWER EYELID: Primary | ICD-10-CM

## 2024-07-31 RX ORDER — LEVOTHYROXINE SODIUM 175 UG/1
175 TABLET ORAL DAILY
Qty: 90 TABLET | Refills: 0 | Status: SHIPPED | OUTPATIENT
Start: 2024-07-31

## 2024-07-31 RX ORDER — DOXYCYCLINE 100 MG/1
100 CAPSULE ORAL 2 TIMES DAILY
Qty: 20 CAPSULE | Refills: 0 | Status: SHIPPED | OUTPATIENT
Start: 2024-07-31 | End: 2024-08-10

## 2024-07-31 RX ORDER — ROSUVASTATIN CALCIUM 20 MG/1
20 TABLET, COATED ORAL DAILY
Qty: 90 TABLET | Refills: 0 | Status: SHIPPED | OUTPATIENT
Start: 2024-07-31

## 2024-07-31 RX ORDER — FOLIC ACID 1 MG/1
TABLET ORAL
Qty: 90 TABLET | Refills: 0 | Status: SHIPPED | OUTPATIENT
Start: 2024-07-31

## 2024-08-27 DIAGNOSIS — R73.01 IMPAIRED FASTING GLUCOSE: Chronic | ICD-10-CM

## 2024-08-27 DIAGNOSIS — E53.8 VITAMIN B12 DEFICIENCY: Chronic | ICD-10-CM

## 2024-08-27 DIAGNOSIS — F41.9 ANXIETY: Chronic | ICD-10-CM

## 2024-08-27 DIAGNOSIS — E55.9 VITAMIN D DEFICIENCY DISEASE: Chronic | ICD-10-CM

## 2024-08-27 DIAGNOSIS — E03.9 ACQUIRED HYPOTHYROIDISM: Chronic | ICD-10-CM

## 2024-08-27 DIAGNOSIS — E53.8 LOW FOLIC ACID: Chronic | ICD-10-CM

## 2024-08-27 DIAGNOSIS — E78.2 MIXED HYPERLIPIDEMIA: Primary | Chronic | ICD-10-CM

## 2024-09-19 ENCOUNTER — OFFICE VISIT (OUTPATIENT)
Dept: FAMILY MEDICINE CLINIC | Facility: CLINIC | Age: 49
End: 2024-09-19
Payer: COMMERCIAL

## 2024-09-19 VITALS
WEIGHT: 182 LBS | HEART RATE: 89 BPM | DIASTOLIC BLOOD PRESSURE: 62 MMHG | SYSTOLIC BLOOD PRESSURE: 100 MMHG | HEIGHT: 67 IN | OXYGEN SATURATION: 94 % | TEMPERATURE: 98.7 F | BODY MASS INDEX: 28.56 KG/M2

## 2024-09-19 DIAGNOSIS — Z12.31 ENCOUNTER FOR SCREENING MAMMOGRAM FOR BREAST CANCER: ICD-10-CM

## 2024-09-19 DIAGNOSIS — E55.9 VITAMIN D DEFICIENCY DISEASE: ICD-10-CM

## 2024-09-19 DIAGNOSIS — F41.9 ANXIETY: ICD-10-CM

## 2024-09-19 DIAGNOSIS — R51.9 CHRONIC DAILY HEADACHE: ICD-10-CM

## 2024-09-19 DIAGNOSIS — E78.2 MIXED HYPERLIPIDEMIA: ICD-10-CM

## 2024-09-19 DIAGNOSIS — Z12.11 SCREEN FOR COLON CANCER: ICD-10-CM

## 2024-09-19 DIAGNOSIS — R29.818 AURA: ICD-10-CM

## 2024-09-19 DIAGNOSIS — E03.9 ACQUIRED HYPOTHYROIDISM: ICD-10-CM

## 2024-09-19 DIAGNOSIS — G43.101 MIGRAINE WITH AURA AND WITH STATUS MIGRAINOSUS, NOT INTRACTABLE: ICD-10-CM

## 2024-09-19 DIAGNOSIS — Z86.69 HISTORY OF CHIARI MALFORMATION: ICD-10-CM

## 2024-09-19 DIAGNOSIS — E78.2 HYPERLIPIDEMIA, MIXED: ICD-10-CM

## 2024-09-19 DIAGNOSIS — E53.8 VITAMIN B12 DEFICIENCY: ICD-10-CM

## 2024-09-19 DIAGNOSIS — F33.1 MAJOR DEPRESSIVE DISORDER, RECURRENT EPISODE, MODERATE DEGREE: ICD-10-CM

## 2024-09-19 DIAGNOSIS — M54.2 NECK PAIN: ICD-10-CM

## 2024-09-19 DIAGNOSIS — E53.8 LOW FOLIC ACID: ICD-10-CM

## 2024-09-19 DIAGNOSIS — Z23 NEED FOR VACCINATION: Primary | ICD-10-CM

## 2024-09-19 PROCEDURE — 90471 IMMUNIZATION ADMIN: CPT | Performed by: PHYSICIAN ASSISTANT

## 2024-09-19 PROCEDURE — 90656 IIV3 VACC NO PRSV 0.5 ML IM: CPT | Performed by: PHYSICIAN ASSISTANT

## 2024-09-19 PROCEDURE — 99214 OFFICE O/P EST MOD 30 MIN: CPT | Performed by: PHYSICIAN ASSISTANT

## 2024-09-19 RX ORDER — SUMATRIPTAN 100 MG/1
TABLET, FILM COATED ORAL
Qty: 9 TABLET | Refills: 5 | Status: SHIPPED | OUTPATIENT
Start: 2024-09-19

## 2024-09-19 RX ORDER — ONDANSETRON 4 MG/1
4 TABLET, FILM COATED ORAL EVERY 8 HOURS PRN
Qty: 45 TABLET | Refills: 11 | Status: SHIPPED | OUTPATIENT
Start: 2024-09-19

## 2024-09-19 RX ORDER — TRAMADOL HYDROCHLORIDE 50 MG/1
50 TABLET ORAL EVERY 6 HOURS PRN
Qty: 90 TABLET | Refills: 2 | Status: SHIPPED | OUTPATIENT
Start: 2024-09-19

## 2024-09-19 RX ORDER — AMITRIPTYLINE HYDROCHLORIDE 10 MG/1
TABLET ORAL
Qty: 60 TABLET | Refills: 2 | Status: SHIPPED | OUTPATIENT
Start: 2024-09-19

## 2024-09-19 RX ORDER — ROSUVASTATIN CALCIUM 20 MG/1
20 TABLET, COATED ORAL DAILY
Qty: 90 TABLET | Refills: 3 | Status: SHIPPED | OUTPATIENT
Start: 2024-09-19

## 2024-09-19 RX ORDER — FOLIC ACID 1 MG/1
1 TABLET ORAL DAILY
Qty: 90 TABLET | Refills: 3 | Status: SHIPPED | OUTPATIENT
Start: 2024-09-19

## 2024-09-19 RX ORDER — LEVOTHYROXINE SODIUM 175 UG/1
175 TABLET ORAL DAILY
Qty: 90 TABLET | Refills: 3 | Status: SHIPPED | OUTPATIENT
Start: 2024-09-19

## 2025-03-04 ENCOUNTER — PATIENT MESSAGE (OUTPATIENT)
Dept: FAMILY MEDICINE CLINIC | Facility: CLINIC | Age: 50
End: 2025-03-04
Payer: COMMERCIAL

## 2025-03-04 DIAGNOSIS — E55.9 VITAMIN D DEFICIENCY DISEASE: ICD-10-CM

## 2025-03-04 DIAGNOSIS — Z12.11 SCREEN FOR COLON CANCER: Primary | ICD-10-CM

## 2025-03-04 DIAGNOSIS — E03.9 ACQUIRED HYPOTHYROIDISM: ICD-10-CM

## 2025-03-04 DIAGNOSIS — E53.8 VITAMIN B12 DEFICIENCY: ICD-10-CM

## 2025-03-04 DIAGNOSIS — R73.01 IMPAIRED FASTING GLUCOSE: ICD-10-CM

## 2025-03-04 DIAGNOSIS — E78.2 MIXED HYPERLIPIDEMIA: ICD-10-CM

## 2025-03-06 RX ORDER — FLUOXETINE HYDROCHLORIDE 40 MG/1
80 CAPSULE ORAL DAILY
Qty: 180 CAPSULE | Refills: 3 | Status: SHIPPED | OUTPATIENT
Start: 2025-03-06

## 2025-05-06 ENCOUNTER — TELEMEDICINE (OUTPATIENT)
Dept: FAMILY MEDICINE CLINIC | Facility: TELEHEALTH | Age: 50
End: 2025-05-06
Payer: COMMERCIAL

## 2025-05-06 DIAGNOSIS — J01.90 ACUTE NON-RECURRENT SINUSITIS, UNSPECIFIED LOCATION: Primary | ICD-10-CM

## 2025-05-06 DIAGNOSIS — J22 LOWER RESPIRATORY INFECTION (E.G., BRONCHITIS, PNEUMONIA, PNEUMONITIS, PULMONITIS): ICD-10-CM

## 2025-05-06 PROCEDURE — 99213 OFFICE O/P EST LOW 20 MIN: CPT | Performed by: NURSE PRACTITIONER

## 2025-05-06 RX ORDER — BENZONATATE 200 MG/1
200 CAPSULE ORAL 3 TIMES DAILY PRN
Qty: 30 CAPSULE | Refills: 0 | Status: SHIPPED | OUTPATIENT
Start: 2025-05-06 | End: 2025-05-16

## 2025-05-06 RX ORDER — DEXTROMETHORPHAN HYDROBROMIDE AND PROMETHAZINE HYDROCHLORIDE 15; 6.25 MG/5ML; MG/5ML
5 SYRUP ORAL 4 TIMES DAILY PRN
Qty: 120 ML | Refills: 0 | Status: SHIPPED | OUTPATIENT
Start: 2025-05-06

## 2025-05-06 RX ORDER — PREDNISONE 10 MG/1
TABLET ORAL
Qty: 21 TABLET | Refills: 0 | Status: SHIPPED | OUTPATIENT
Start: 2025-05-06

## 2025-05-06 NOTE — PATIENT INSTRUCTIONS
Sinus Infection, Adult  A sinus infection, also called sinusitis, is inflammation of your sinuses. Sinuses are hollow spaces in the bones around your face. Your sinuses are located:  Around your eyes.  In the middle of your forehead.  Behind your nose.  In your cheekbones.  Mucus normally drains out of your sinuses. When your nasal tissues become inflamed or swollen, mucus can become trapped or blocked. This allows bacteria, viruses, and fungi to grow, which leads to infection. Most infections of the sinuses are caused by a virus.  A sinus infection can develop quickly. It can last for up to 4 weeks (acute) or for more than 12 weeks (chronic). A sinus infection often develops after a cold.  What are the causes?  This condition is caused by anything that creates swelling in the sinuses or stops mucus from draining. This includes:  Allergies.  Asthma.  Infection from bacteria or viruses.  Deformities or blockages in your nose or sinuses.  Abnormal growths in the nose (nasal polyps).  Pollutants, such as chemicals or irritants in the air.  Infection from fungi. This is rare.  What increases the risk?  You are more likely to develop this condition if you:  Have a weak body defense system (immune system).  Do a lot of swimming or diving.  Overuse nasal sprays.  Smoke.  What are the signs or symptoms?  The main symptoms of this condition are pain and a feeling of pressure around the affected sinuses. Other symptoms include:  Stuffy nose or congestion that makes it difficult to breathe through your nose.  Thick yellow or greenish drainage from your nose.  Tenderness, swelling, and warmth over the affected sinuses.  A cough that may get worse at night.  Decreased sense of smell and taste.  Extra mucus that collects in the throat or the back of the nose (postnasal drip) causing a sore throat or bad breath.  Tiredness (fatigue).  Fever.  How is this diagnosed?  This condition is diagnosed based on:  Your symptoms.  Your  medical history.  A physical exam.  Tests to find out if your condition is acute or chronic. This may include:  Checking your nose for nasal polyps.  Viewing your sinuses using a device that has a light (endoscope).  Testing for allergies or bacteria.  Imaging tests, such as an MRI or CT scan.  In rare cases, a bone biopsy may be done to rule out more serious types of fungal sinus disease.  How is this treated?  Treatment for a sinus infection depends on the cause and whether your condition is chronic or acute.  If caused by a virus, your symptoms should go away on their own within 10 days. You may be given medicines to relieve symptoms. They include:  Medicines that shrink swollen nasal passages (decongestants).  A spray that eases inflammation of the nostrils (topical intranasal corticosteroids).  Rinses that help get rid of thick mucus in your nose (nasal saline washes).  Medicines that treat allergies (antihistamines).  Over-the-counter pain relievers.  If caused by bacteria, your health care provider may recommend waiting to see if your symptoms improve. Most bacterial infections will get better without antibiotic medicine. You may be given antibiotics if you have:  A severe infection.  A weak immune system.  If caused by narrow nasal passages or nasal polyps, surgery may be needed.  Follow these instructions at home:  Medicines  Take, use, or apply over-the-counter and prescription medicines only as told by your health care provider. These may include nasal sprays.  If you were prescribed an antibiotic medicine, take it as told by your health care provider. Do not stop taking the antibiotic even if you start to feel better.  Hydrate and humidify    Drink enough fluid to keep your urine pale yellow. Staying hydrated will help to thin your mucus.  Use a cool mist humidifier to keep the humidity level in your home above 50%.  Inhale steam for 10-15 minutes, 3-4 times a day, or as told by your health care  provider. You can do this in the bathroom while a hot shower is running.  Limit your exposure to cool or dry air.  Rest  Rest as much as possible.  Sleep with your head raised (elevated).  Make sure you get enough sleep each night.  General instructions    Apply a warm, moist washcloth to your face 3-4 times a day or as told by your health care provider. This will help with discomfort.  Use nasal saline washes as often as told by your health care provider.  Wash your hands often with soap and water to reduce your exposure to germs. If soap and water are not available, use hand .  Do not smoke. Avoid being around people who are smoking (secondhand smoke).  Keep all follow-up visits. This is important.  Contact a health care provider if:  You have a fever.  Your symptoms get worse.  Your symptoms do not improve within 10 days.  Get help right away if:  You have a severe headache.  You have persistent vomiting.  You have severe pain or swelling around your face or eyes.  You have vision problems.  You develop confusion.  Your neck is stiff.  You have trouble breathing.  These symptoms may be an emergency. Get help right away. Call 911.  Do not wait to see if the symptoms will go away.  Do not drive yourself to the hospital.  Summary  A sinus infection is soreness and inflammation of your sinuses. Sinuses are hollow spaces in the bones around your face.  This condition is caused by nasal tissues that become inflamed or swollen. The swelling traps or blocks the flow of mucus. This allows bacteria, viruses, and fungi to grow, which leads to infection.  If you were prescribed an antibiotic medicine, take it as told by your health care provider. Do not stop taking the antibiotic even if you start to feel better.  Keep all follow-up visits. This is important.  This information is not intended to replace advice given to you by your health care provider. Make sure you discuss any questions you have with your health  care provider.  Document Revised: 11/22/2022 Document Reviewed: 11/22/2022  [employer] Patient Education © 2024 [employer]  Steps to Quit Smoking  Smoking tobacco is the leading cause of preventable death. It can affect almost every organ in the body. Smoking puts you and those around you at risk for developing many serious chronic diseases.  Quitting smoking can be very challenging. Do not get discouraged if you are not successful the first time. Some people need to make many attempts to quit before they achieve long-term success. Do your best to stick to your quit plan, and talk with your health care provider if you have any questions or concerns.  How do I get ready to quit?  When you decide to quit smoking, create a plan to help you succeed. Before you quit:  Pick a date to quit. Set a date within the next 2 weeks to give you time to prepare.  Write down the reasons why you are quitting. Keep this list in places where you will see it often.  Tell your family, friends, and co-workers that you are quitting. Support from people you are close to can make quitting easier.  Talk with your health care provider about your options for quitting smoking.  Find out what treatment options are covered by your health insurance.  Identify people, places, things, and activities that make you want to smoke (triggers). Avoid them.  What first steps can I take to quit smoking?  Throw away all cigarettes at home, at work, and in your car.  Throw away smoking accessories, such as ashtrays and lighters.  Clean your car. Make sure to empty the ashtray.  Clean your home, including curtains and carpets.  What strategies can I use to quit smoking?  Talk with your health care provider about combining strategies, such as taking medicines while you are also receiving in-person counseling. Using these two strategies together makes you more likely to succeed in quitting than if you used either strategy on its own.  If you are pregnant or  breastfeeding, talk with your health care provider about finding counseling or other support strategies to quit smoking. Do not take medicine to help you quit smoking unless your health care provider tells you to.  Quit right away  Quit smoking completely, instead of gradually reducing how much you smoke over a period of time. Stopping smoking right away may be more successful than gradually quitting.  Attend in-person counseling to help you build problem-solving skills. You are more likely to succeed in quitting if you attend counseling sessions regularly. Even short sessions of 10 minutes can be effective.  Take medicine  You may take medicines to help you quit smoking. Some medicines require a prescription. You can also purchase over-the-counter medicines. Medicines may have nicotine in them to replace the nicotine in cigarettes. Medicines may:  Help to stop cravings.  Help to relieve withdrawal symptoms.  Your health care provider may recommend:  Nicotine patches, gum, or lozenges.  Nicotine inhalers or sprays.  Non-nicotine medicine that you take by mouth.  Find resources  Find resources and support systems that can help you quit smoking and remain smoke-free after you quit. These resources are most helpful when you use them often. They include:  Online chats with a counselor.  Telephone quitlines.  Printed self-help materials.  Support groups or group counseling.  Text messaging programs.  Mobile phone apps or applications. Use apps that can help you stick to your quit plan by providing reminders, tips, and encouragement. Examples of free services include Quit Guide from the CDC and smokefree.gov    What can I do to make it easier to quit?    Reach out to your family and friends for support and encouragement. Call telephone quitlines, such as 1-800-QUIT-NOW, reach out to support groups, or work with a counselor for support.  Ask people who smoke to avoid smoking around you.  Avoid places that trigger you to  smoke, such as bars, parties, or smoke-break areas at work.  Spend time with people who do not smoke.  Lessen the stress in your life. Stress can be a smoking trigger for some people. To lessen stress, try:  Exercising regularly.  Doing deep-breathing exercises.  Doing yoga.  Meditating.  What benefits will I see if I quit smoking?  Over time, you should start to see positive results, such as:  Improved sense of smell and taste.  Decreased coughing and sore throat.  Slower heart rate.  Lower blood pressure.  Clearer and healthier skin.  The ability to breathe more easily.  Fewer sick days.  Summary  Quitting smoking can be very challenging. Do not get discouraged if you are not successful the first time. Some people need to make many attempts to quit before they achieve long-term success.  When you decide to quit smoking, create a plan to help you succeed.  Quit smoking right away, not slowly over a period of time.  Find resources and support systems that can help you quit smoking and remain smoke-free after you quit.  This information is not intended to replace advice given to you by your health care provider. Make sure you discuss any questions you have with your health care provider.  Document Revised: 12/09/2022 Document Reviewed: 12/09/2022  [employer] Patient Education © 2024 [employer]

## 2025-05-06 NOTE — PROGRESS NOTES
HPI  Cindy Hawk is a 49 y.o. female  presents with complaint of 1.5 week history of sneezing, runny nose, occasional cough.   Symptoms worsened the last 2 days with non-productive cough, coughing spells, constant runny nose and chest tightness. Denies fever, chills, sweats (other than due to hot flashes). She has taken advil and nasal decongestants but symptoms continue to worsen.       Review of Systems    Past Medical History:   Diagnosis Date    Anxiety     Arnold-Chiari malformation, type I     Arthritis     Chiari I malformation     Disease of thyroid gland     Headache     Hyperlipidemia     Insomnia     Neck pain     Peripheral neuropathy     Vitamin B12 deficiency     Vitamin D deficiency disease        Family History   Problem Relation Age of Onset    COPD Mother     Emphysema Mother     Pulmonary embolism Father     Alcohol abuse Other     Diabetes Other     Cancer Other     Kidney disease Other        Social History     Socioeconomic History    Marital status:     Number of children: 3    Years of education: [employer]IGHS[employer]   Tobacco Use    Smoking status: Every Day     Current packs/day: 0.50     Average packs/day: 0.5 packs/day for 9.3 years (4.6 ttl pk-yrs)     Types: Cigarettes     Start date: 1/23/2016     Passive exposure: Current    Smokeless tobacco: Never   Vaping Use    Vaping status: Never Used   Substance and Sexual Activity    Alcohol use: No    Drug use: No    Sexual activity: Defer         There were no vitals taken for this visit.    PHYSICAL EXAM  Physical Exam   Constitutional: She appears well-developed and well-nourished.   HENT:   Head: Normocephalic.   Nose: Rhinorrhea present.   Neck: Neck normal appearance.  Pulmonary/Chest: Effort normal.   Frequent cough   Neurological: She is alert.   Psychiatric: She has a normal mood and affect. Her speech is normal.       Diagnoses and all orders for this visit:    1. Acute non-recurrent sinusitis, unspecified location (Primary)  -      predniSONE (DELTASONE) 10 MG tablet; Prednisone 10mg tablet taper pack for 6 days as directed  Dispense: 21 tablet; Refill: 0  -     amoxicillin-clavulanate (AUGMENTIN) 875-125 MG per tablet; Take 1 tablet by mouth 2 (Two) Times a Day for 7 days.  Dispense: 14 tablet; Refill: 0  -     benzonatate (TESSALON) 200 MG capsule; Take 1 capsule by mouth 3 (Three) Times a Day As Needed for Cough for up to 10 days.  Dispense: 30 capsule; Refill: 0  -     promethazine-dextromethorphan (PROMETHAZINE-DM) 6.25-15 MG/5ML syrup; Take 5 mL by mouth 4 (Four) Times a Day As Needed for Cough.  Dispense: 120 mL; Refill: 0    2. Lower respiratory infection (e.g., bronchitis, pneumonia, pneumonitis, pulmonitis)  -     predniSONE (DELTASONE) 10 MG tablet; Prednisone 10mg tablet taper pack for 6 days as directed  Dispense: 21 tablet; Refill: 0  -     amoxicillin-clavulanate (AUGMENTIN) 875-125 MG per tablet; Take 1 tablet by mouth 2 (Two) Times a Day for 7 days.  Dispense: 14 tablet; Refill: 0  -     benzonatate (TESSALON) 200 MG capsule; Take 1 capsule by mouth 3 (Three) Times a Day As Needed for Cough for up to 10 days.  Dispense: 30 capsule; Refill: 0  -     promethazine-dextromethorphan (PROMETHAZINE-DM) 6.25-15 MG/5ML syrup; Take 5 mL by mouth 4 (Four) Times a Day As Needed for Cough.  Dispense: 120 mL; Refill: 0      Start zyrtec and flonase that you have at home.     FOLLOW-UP  As discussed during visit with [facility], if symptoms worsen or fail to improve, follow-up with PCP/Urgent Care/Emergency Department.    Patient verbalizes understanding of medications, instructions for treatment and follow-up.    Whit Minaya, APRN  05/06/2025  15:41 EDT      Mode of Visit: Video  Location of patient: -HOME-  Location of provider: Home  You have chosen to receive care through a telehealth visit.  The patient has signed the video visit consent form.  The visit included audio and video interaction. No technical issues  occurred during this visit.